# Patient Record
Sex: FEMALE | Race: WHITE | NOT HISPANIC OR LATINO | URBAN - METROPOLITAN AREA
[De-identification: names, ages, dates, MRNs, and addresses within clinical notes are randomized per-mention and may not be internally consistent; named-entity substitution may affect disease eponyms.]

---

## 2019-11-25 ENCOUNTER — INPATIENT (INPATIENT)
Facility: HOSPITAL | Age: 74
LOS: 15 days | Discharge: HOME HEALTH | End: 2019-12-11
Attending: INTERNAL MEDICINE | Admitting: INTERNAL MEDICINE
Payer: MEDICARE

## 2019-11-25 VITALS — HEART RATE: 166 BPM | OXYGEN SATURATION: 92 %

## 2019-11-25 DIAGNOSIS — E11.9 TYPE 2 DIABETES MELLITUS WITHOUT COMPLICATIONS: ICD-10-CM

## 2019-11-25 DIAGNOSIS — A41.9 SEPSIS, UNSPECIFIED ORGANISM: ICD-10-CM

## 2019-11-25 DIAGNOSIS — I48.91 UNSPECIFIED ATRIAL FIBRILLATION: ICD-10-CM

## 2019-11-25 DIAGNOSIS — N39.0 URINARY TRACT INFECTION, SITE NOT SPECIFIED: ICD-10-CM

## 2019-11-25 DIAGNOSIS — Z29.9 ENCOUNTER FOR PROPHYLACTIC MEASURES, UNSPECIFIED: ICD-10-CM

## 2019-11-25 DIAGNOSIS — J18.9 PNEUMONIA, UNSPECIFIED ORGANISM: ICD-10-CM

## 2019-11-25 DIAGNOSIS — J96.01 ACUTE RESPIRATORY FAILURE WITH HYPOXIA: ICD-10-CM

## 2019-11-25 DIAGNOSIS — J44.9 CHRONIC OBSTRUCTIVE PULMONARY DISEASE, UNSPECIFIED: ICD-10-CM

## 2019-11-25 LAB
ALBUMIN SERPL ELPH-MCNC: 3.8 G/DL — SIGNIFICANT CHANGE UP (ref 3.3–5)
ALP SERPL-CCNC: 99 U/L — SIGNIFICANT CHANGE UP (ref 40–120)
ALT FLD-CCNC: 13 U/L — SIGNIFICANT CHANGE UP (ref 12–78)
AMMONIA BLD-MCNC: 25 UMOL/L — SIGNIFICANT CHANGE UP (ref 11–32)
AMPHET UR-MCNC: NEGATIVE — SIGNIFICANT CHANGE UP
ANION GAP SERPL CALC-SCNC: 11 MMOL/L — SIGNIFICANT CHANGE UP (ref 5–17)
APPEARANCE UR: ABNORMAL
APTT BLD: 28.7 SEC — SIGNIFICANT CHANGE UP (ref 27.5–36.3)
AST SERPL-CCNC: 10 U/L — LOW (ref 15–37)
BACTERIA # UR AUTO: ABNORMAL
BARBITURATES UR SCN-MCNC: NEGATIVE — SIGNIFICANT CHANGE UP
BASE EXCESS BLDA CALC-SCNC: -2 MMOL/L — SIGNIFICANT CHANGE UP (ref -2–2)
BASE EXCESS BLDA CALC-SCNC: -5.5 MMOL/L — LOW (ref -2–2)
BASOPHILS # BLD AUTO: 0.04 K/UL — SIGNIFICANT CHANGE UP (ref 0–0.2)
BASOPHILS NFR BLD AUTO: 0.3 % — SIGNIFICANT CHANGE UP (ref 0–2)
BENZODIAZ UR-MCNC: POSITIVE — SIGNIFICANT CHANGE UP
BILIRUB SERPL-MCNC: 0.5 MG/DL — SIGNIFICANT CHANGE UP (ref 0.2–1.2)
BILIRUB UR-MCNC: NEGATIVE — SIGNIFICANT CHANGE UP
BLD GP AB SCN SERPL QL: SIGNIFICANT CHANGE UP
BLOOD GAS COMMENTS: SIGNIFICANT CHANGE UP
BLOOD GAS SOURCE: SIGNIFICANT CHANGE UP
BLOOD GAS SOURCE: SIGNIFICANT CHANGE UP
BUN SERPL-MCNC: 32 MG/DL — HIGH (ref 7–23)
CALCIUM SERPL-MCNC: 10.7 MG/DL — HIGH (ref 8.5–10.1)
CHLORIDE SERPL-SCNC: 117 MMOL/L — HIGH (ref 96–108)
CK MB CFR SERPL CALC: <1 NG/ML — SIGNIFICANT CHANGE UP (ref 0.5–3.6)
CO2 SERPL-SCNC: 24 MMOL/L — SIGNIFICANT CHANGE UP (ref 22–31)
COCAINE METAB.OTHER UR-MCNC: NEGATIVE — SIGNIFICANT CHANGE UP
COLOR SPEC: YELLOW — SIGNIFICANT CHANGE UP
COMMENT - URINE: SIGNIFICANT CHANGE UP
CREAT SERPL-MCNC: 0.98 MG/DL — SIGNIFICANT CHANGE UP (ref 0.5–1.3)
D DIMER BLD IA.RAPID-MCNC: 507 NG/ML DDU — HIGH
DIFF PNL FLD: ABNORMAL
EOSINOPHIL # BLD AUTO: 0 K/UL — SIGNIFICANT CHANGE UP (ref 0–0.5)
EOSINOPHIL NFR BLD AUTO: 0 % — SIGNIFICANT CHANGE UP (ref 0–6)
EPI CELLS # UR: ABNORMAL
ETHANOL SERPL-MCNC: <10 MG/DL — SIGNIFICANT CHANGE UP (ref 0–10)
FLU A RESULT: SIGNIFICANT CHANGE UP
FLU A RESULT: SIGNIFICANT CHANGE UP
FLUAV AG NPH QL: SIGNIFICANT CHANGE UP
FLUBV AG NPH QL: SIGNIFICANT CHANGE UP
GLUCOSE BLDC GLUCOMTR-MCNC: 181 MG/DL — HIGH (ref 70–99)
GLUCOSE BLDC GLUCOMTR-MCNC: 214 MG/DL — HIGH (ref 70–99)
GLUCOSE BLDC GLUCOMTR-MCNC: 221 MG/DL — HIGH (ref 70–99)
GLUCOSE SERPL-MCNC: 268 MG/DL — HIGH (ref 70–99)
GLUCOSE UR QL: 1000 MG/DL
HCO3 BLDA-SCNC: 22 MMOL/L — SIGNIFICANT CHANGE UP (ref 21–29)
HCO3 BLDA-SCNC: 23 MMOL/L — SIGNIFICANT CHANGE UP (ref 21–29)
HCT VFR BLD CALC: 54.6 % — HIGH (ref 34.5–45)
HGB BLD-MCNC: 16.7 G/DL — HIGH (ref 11.5–15.5)
HOROWITZ INDEX BLDA+IHG-RTO: 100 — SIGNIFICANT CHANGE UP
HOROWITZ INDEX BLDA+IHG-RTO: 80 — SIGNIFICANT CHANGE UP
IMM GRANULOCYTES NFR BLD AUTO: 0.3 % — SIGNIFICANT CHANGE UP (ref 0–1.5)
INR BLD: 1.31 RATIO — HIGH (ref 0.88–1.16)
KETONES UR-MCNC: ABNORMAL
LACTATE SERPL-SCNC: 2.3 MMOL/L — HIGH (ref 0.7–2)
LACTATE SERPL-SCNC: 2.4 MMOL/L — HIGH (ref 0.7–2)
LEUKOCYTE ESTERASE UR-ACNC: ABNORMAL
LIDOCAIN IGE QN: 68 U/L — LOW (ref 73–393)
LYMPHOCYTES # BLD AUTO: 1.75 K/UL — SIGNIFICANT CHANGE UP (ref 1–3.3)
LYMPHOCYTES # BLD AUTO: 14.1 % — SIGNIFICANT CHANGE UP (ref 13–44)
MCHC RBC-ENTMCNC: 28.3 PG — SIGNIFICANT CHANGE UP (ref 27–34)
MCHC RBC-ENTMCNC: 30.6 GM/DL — LOW (ref 32–36)
MCV RBC AUTO: 92.4 FL — SIGNIFICANT CHANGE UP (ref 80–100)
METHADONE UR-MCNC: NEGATIVE — SIGNIFICANT CHANGE UP
MONOCYTES # BLD AUTO: 0.87 K/UL — SIGNIFICANT CHANGE UP (ref 0–0.9)
MONOCYTES NFR BLD AUTO: 7 % — SIGNIFICANT CHANGE UP (ref 2–14)
NEUTROPHILS # BLD AUTO: 9.74 K/UL — HIGH (ref 1.8–7.4)
NEUTROPHILS NFR BLD AUTO: 78.3 % — HIGH (ref 43–77)
NITRITE UR-MCNC: NEGATIVE — SIGNIFICANT CHANGE UP
NRBC # BLD: 0 /100 WBCS — SIGNIFICANT CHANGE UP (ref 0–0)
OPIATES UR-MCNC: NEGATIVE — SIGNIFICANT CHANGE UP
PCO2 BLDA: 42 MMHG — SIGNIFICANT CHANGE UP (ref 32–46)
PCO2 BLDA: 54 MMHG — HIGH (ref 32–46)
PCP SPEC-MCNC: SIGNIFICANT CHANGE UP
PCP UR-MCNC: NEGATIVE — SIGNIFICANT CHANGE UP
PH BLD: 7.24 — LOW (ref 7.35–7.45)
PH BLD: 7.36 — SIGNIFICANT CHANGE UP (ref 7.35–7.45)
PH UR: 5 — SIGNIFICANT CHANGE UP (ref 5–8)
PLATELET # BLD AUTO: 294 K/UL — SIGNIFICANT CHANGE UP (ref 150–400)
PO2 BLDA: 130 MMHG — HIGH (ref 74–108)
PO2 BLDA: 74 MMHG — SIGNIFICANT CHANGE UP (ref 74–108)
POTASSIUM SERPL-MCNC: 3.9 MMOL/L — SIGNIFICANT CHANGE UP (ref 3.5–5.3)
POTASSIUM SERPL-SCNC: 3.9 MMOL/L — SIGNIFICANT CHANGE UP (ref 3.5–5.3)
PROT SERPL-MCNC: 8.8 GM/DL — HIGH (ref 6–8.3)
PROT UR-MCNC: 100 MG/DL
PROTHROM AB SERPL-ACNC: 14.8 SEC — HIGH (ref 10–12.9)
RBC # BLD: 5.91 M/UL — HIGH (ref 3.8–5.2)
RBC # FLD: 14.9 % — HIGH (ref 10.3–14.5)
RSV RESULT: SIGNIFICANT CHANGE UP
RSV RNA RESP QL NAA+PROBE: SIGNIFICANT CHANGE UP
SAO2 % BLDA: 95 % — SIGNIFICANT CHANGE UP (ref 92–96)
SAO2 % BLDA: 98 % — HIGH (ref 92–96)
SODIUM SERPL-SCNC: 152 MMOL/L — HIGH (ref 135–145)
SP GR SPEC: 1.01 — SIGNIFICANT CHANGE UP (ref 1.01–1.02)
THC UR QL: NEGATIVE — SIGNIFICANT CHANGE UP
TROPONIN I SERPL-MCNC: <.015 NG/ML — SIGNIFICANT CHANGE UP (ref 0.01–0.04)
UROBILINOGEN FLD QL: NEGATIVE MG/DL — SIGNIFICANT CHANGE UP
WBC # BLD: 12.44 K/UL — HIGH (ref 3.8–10.5)
WBC # FLD AUTO: 12.44 K/UL — HIGH (ref 3.8–10.5)
WBC UR QL: >50

## 2019-11-25 PROCEDURE — 93010 ELECTROCARDIOGRAM REPORT: CPT

## 2019-11-25 PROCEDURE — 31500 INSERT EMERGENCY AIRWAY: CPT

## 2019-11-25 PROCEDURE — 71275 CT ANGIOGRAPHY CHEST: CPT | Mod: 26

## 2019-11-25 PROCEDURE — 99291 CRITICAL CARE FIRST HOUR: CPT | Mod: 25

## 2019-11-25 PROCEDURE — 71045 X-RAY EXAM CHEST 1 VIEW: CPT | Mod: 26

## 2019-11-25 PROCEDURE — 71045 X-RAY EXAM CHEST 1 VIEW: CPT | Mod: 26,76

## 2019-11-25 PROCEDURE — 74177 CT ABD & PELVIS W/CONTRAST: CPT | Mod: 26

## 2019-11-25 PROCEDURE — 71045 X-RAY EXAM CHEST 1 VIEW: CPT | Mod: 26,77

## 2019-11-25 PROCEDURE — 70450 CT HEAD/BRAIN W/O DYE: CPT | Mod: 26

## 2019-11-25 PROCEDURE — 99291 CRITICAL CARE FIRST HOUR: CPT

## 2019-11-25 RX ORDER — VANCOMYCIN HCL 1 G
1000 VIAL (EA) INTRAVENOUS EVERY 24 HOURS
Refills: 0 | Status: DISCONTINUED | OUTPATIENT
Start: 2019-11-25 | End: 2019-11-25

## 2019-11-25 RX ORDER — ENOXAPARIN SODIUM 100 MG/ML
40 INJECTION SUBCUTANEOUS EVERY 12 HOURS
Refills: 0 | Status: DISCONTINUED | OUTPATIENT
Start: 2019-11-25 | End: 2019-11-25

## 2019-11-25 RX ORDER — FLUCONAZOLE 150 MG/1
100 TABLET ORAL EVERY 24 HOURS
Refills: 0 | Status: DISCONTINUED | OUTPATIENT
Start: 2019-11-26 | End: 2019-11-26

## 2019-11-25 RX ORDER — CHLORHEXIDINE GLUCONATE 213 G/1000ML
1 SOLUTION TOPICAL
Refills: 0 | Status: DISCONTINUED | OUTPATIENT
Start: 2019-11-25 | End: 2019-12-04

## 2019-11-25 RX ORDER — FLUCONAZOLE 150 MG/1
TABLET ORAL
Refills: 0 | Status: DISCONTINUED | OUTPATIENT
Start: 2019-11-25 | End: 2019-11-26

## 2019-11-25 RX ORDER — AZITHROMYCIN 500 MG/1
TABLET, FILM COATED ORAL
Refills: 0 | Status: DISCONTINUED | OUTPATIENT
Start: 2019-11-25 | End: 2019-11-26

## 2019-11-25 RX ORDER — AZITHROMYCIN 500 MG/1
500 TABLET, FILM COATED ORAL ONCE
Refills: 0 | Status: COMPLETED | OUTPATIENT
Start: 2019-11-25 | End: 2019-11-25

## 2019-11-25 RX ORDER — MIDAZOLAM HYDROCHLORIDE 1 MG/ML
1 INJECTION, SOLUTION INTRAMUSCULAR; INTRAVENOUS ONCE
Refills: 0 | Status: DISCONTINUED | OUTPATIENT
Start: 2019-11-25 | End: 2019-11-25

## 2019-11-25 RX ORDER — MIDAZOLAM HYDROCHLORIDE 1 MG/ML
1 INJECTION, SOLUTION INTRAMUSCULAR; INTRAVENOUS EVERY 8 HOURS
Refills: 0 | Status: DISCONTINUED | OUTPATIENT
Start: 2019-11-25 | End: 2019-12-02

## 2019-11-25 RX ORDER — PROPOFOL 10 MG/ML
20 INJECTION, EMULSION INTRAVENOUS
Qty: 1000 | Refills: 0 | Status: DISCONTINUED | OUTPATIENT
Start: 2019-11-25 | End: 2019-11-28

## 2019-11-25 RX ORDER — SODIUM CHLORIDE 9 MG/ML
1000 INJECTION, SOLUTION INTRAVENOUS
Refills: 0 | Status: DISCONTINUED | OUTPATIENT
Start: 2019-11-25 | End: 2019-11-26

## 2019-11-25 RX ORDER — PIPERACILLIN AND TAZOBACTAM 4; .5 G/20ML; G/20ML
3.38 INJECTION, POWDER, LYOPHILIZED, FOR SOLUTION INTRAVENOUS ONCE
Refills: 0 | Status: COMPLETED | OUTPATIENT
Start: 2019-11-25 | End: 2019-11-25

## 2019-11-25 RX ORDER — METOPROLOL TARTRATE 50 MG
5 TABLET ORAL EVERY 6 HOURS
Refills: 0 | Status: DISCONTINUED | OUTPATIENT
Start: 2019-11-25 | End: 2019-11-26

## 2019-11-25 RX ORDER — CHLORHEXIDINE GLUCONATE 213 G/1000ML
15 SOLUTION TOPICAL EVERY 12 HOURS
Refills: 0 | Status: DISCONTINUED | OUTPATIENT
Start: 2019-11-25 | End: 2019-12-02

## 2019-11-25 RX ORDER — DEXTROSE 50 % IN WATER 50 %
12.5 SYRINGE (ML) INTRAVENOUS ONCE
Refills: 0 | Status: DISCONTINUED | OUTPATIENT
Start: 2019-11-25 | End: 2019-12-09

## 2019-11-25 RX ORDER — SODIUM CHLORIDE 9 MG/ML
3000 INJECTION, SOLUTION INTRAVENOUS ONCE
Refills: 0 | Status: COMPLETED | OUTPATIENT
Start: 2019-11-25 | End: 2019-11-25

## 2019-11-25 RX ORDER — GLUCAGON INJECTION, SOLUTION 0.5 MG/.1ML
1 INJECTION, SOLUTION SUBCUTANEOUS ONCE
Refills: 0 | Status: DISCONTINUED | OUTPATIENT
Start: 2019-11-25 | End: 2019-12-09

## 2019-11-25 RX ORDER — VANCOMYCIN HCL 1 G
750 VIAL (EA) INTRAVENOUS EVERY 12 HOURS
Refills: 0 | Status: DISCONTINUED | OUTPATIENT
Start: 2019-11-25 | End: 2019-11-27

## 2019-11-25 RX ORDER — ENOXAPARIN SODIUM 100 MG/ML
90 INJECTION SUBCUTANEOUS EVERY 12 HOURS
Refills: 0 | Status: DISCONTINUED | OUTPATIENT
Start: 2019-11-25 | End: 2019-11-26

## 2019-11-25 RX ORDER — FLUCONAZOLE 150 MG/1
200 TABLET ORAL ONCE
Refills: 0 | Status: COMPLETED | OUTPATIENT
Start: 2019-11-25 | End: 2019-11-25

## 2019-11-25 RX ORDER — PIPERACILLIN AND TAZOBACTAM 4; .5 G/20ML; G/20ML
3.38 INJECTION, POWDER, LYOPHILIZED, FOR SOLUTION INTRAVENOUS EVERY 8 HOURS
Refills: 0 | Status: DISCONTINUED | OUTPATIENT
Start: 2019-11-25 | End: 2019-11-29

## 2019-11-25 RX ORDER — DEXTROSE 50 % IN WATER 50 %
25 SYRINGE (ML) INTRAVENOUS ONCE
Refills: 0 | Status: DISCONTINUED | OUTPATIENT
Start: 2019-11-25 | End: 2019-12-09

## 2019-11-25 RX ORDER — DEXMEDETOMIDINE HYDROCHLORIDE IN 0.9% SODIUM CHLORIDE 4 UG/ML
0.2 INJECTION INTRAVENOUS
Qty: 200 | Refills: 0 | Status: DISCONTINUED | OUTPATIENT
Start: 2019-11-25 | End: 2019-11-28

## 2019-11-25 RX ORDER — DILTIAZEM HCL 120 MG
10 CAPSULE, EXT RELEASE 24 HR ORAL ONCE
Refills: 0 | Status: COMPLETED | OUTPATIENT
Start: 2019-11-25 | End: 2019-11-25

## 2019-11-25 RX ORDER — IPRATROPIUM/ALBUTEROL SULFATE 18-103MCG
3 AEROSOL WITH ADAPTER (GRAM) INHALATION EVERY 6 HOURS
Refills: 0 | Status: DISCONTINUED | OUTPATIENT
Start: 2019-11-25 | End: 2019-12-11

## 2019-11-25 RX ORDER — INSULIN LISPRO 100/ML
VIAL (ML) SUBCUTANEOUS EVERY 6 HOURS
Refills: 0 | Status: DISCONTINUED | OUTPATIENT
Start: 2019-11-25 | End: 2019-12-09

## 2019-11-25 RX ORDER — SODIUM CHLORIDE 9 MG/ML
1000 INJECTION, SOLUTION INTRAVENOUS
Refills: 0 | Status: DISCONTINUED | OUTPATIENT
Start: 2019-11-25 | End: 2019-11-25

## 2019-11-25 RX ORDER — SODIUM CHLORIDE 9 MG/ML
1000 INJECTION, SOLUTION INTRAVENOUS
Refills: 0 | Status: DISCONTINUED | OUTPATIENT
Start: 2019-11-25 | End: 2019-12-09

## 2019-11-25 RX ORDER — DEXTROSE 50 % IN WATER 50 %
15 SYRINGE (ML) INTRAVENOUS ONCE
Refills: 0 | Status: DISCONTINUED | OUTPATIENT
Start: 2019-11-25 | End: 2019-12-09

## 2019-11-25 RX ORDER — AZITHROMYCIN 500 MG/1
500 TABLET, FILM COATED ORAL EVERY 24 HOURS
Refills: 0 | Status: DISCONTINUED | OUTPATIENT
Start: 2019-11-26 | End: 2019-11-26

## 2019-11-25 RX ORDER — VANCOMYCIN HCL 1 G
1000 VIAL (EA) INTRAVENOUS ONCE
Refills: 0 | Status: COMPLETED | OUTPATIENT
Start: 2019-11-25 | End: 2019-11-25

## 2019-11-25 RX ORDER — ACETAMINOPHEN 500 MG
1000 TABLET ORAL ONCE
Refills: 0 | Status: COMPLETED | OUTPATIENT
Start: 2019-11-25 | End: 2019-11-25

## 2019-11-25 RX ORDER — NOREPINEPHRINE BITARTRATE/D5W 8 MG/250ML
0.05 PLASTIC BAG, INJECTION (ML) INTRAVENOUS
Qty: 8 | Refills: 0 | Status: DISCONTINUED | OUTPATIENT
Start: 2019-11-25 | End: 2019-11-27

## 2019-11-25 RX ADMIN — Medication 5 MILLIGRAM(S): at 17:03

## 2019-11-25 RX ADMIN — SODIUM CHLORIDE 75 MILLILITER(S): 9 INJECTION, SOLUTION INTRAVENOUS at 20:58

## 2019-11-25 RX ADMIN — CHLORHEXIDINE GLUCONATE 15 MILLILITER(S): 213 SOLUTION TOPICAL at 18:11

## 2019-11-25 RX ADMIN — MIDAZOLAM HYDROCHLORIDE 1 MILLIGRAM(S): 1 INJECTION, SOLUTION INTRAMUSCULAR; INTRAVENOUS at 19:19

## 2019-11-25 RX ADMIN — PIPERACILLIN AND TAZOBACTAM 200 GRAM(S): 4; .5 INJECTION, POWDER, LYOPHILIZED, FOR SOLUTION INTRAVENOUS at 14:41

## 2019-11-25 RX ADMIN — SODIUM CHLORIDE 3000 MILLILITER(S): 9 INJECTION, SOLUTION INTRAVENOUS at 12:59

## 2019-11-25 RX ADMIN — Medication 2: at 17:51

## 2019-11-25 RX ADMIN — DEXMEDETOMIDINE HYDROCHLORIDE IN 0.9% SODIUM CHLORIDE 4.54 MICROGRAM(S)/KG/HR: 4 INJECTION INTRAVENOUS at 20:50

## 2019-11-25 RX ADMIN — CHLORHEXIDINE GLUCONATE 1 APPLICATION(S): 213 SOLUTION TOPICAL at 16:45

## 2019-11-25 RX ADMIN — Medication 8.5 MICROGRAM(S)/KG/MIN: at 20:36

## 2019-11-25 RX ADMIN — ENOXAPARIN SODIUM 90 MILLIGRAM(S): 100 INJECTION SUBCUTANEOUS at 18:10

## 2019-11-25 RX ADMIN — PROPOFOL 10.88 MICROGRAM(S)/KG/MIN: 10 INJECTION, EMULSION INTRAVENOUS at 17:00

## 2019-11-25 RX ADMIN — Medication 4: at 23:50

## 2019-11-25 RX ADMIN — SODIUM CHLORIDE 75 MILLILITER(S): 9 INJECTION, SOLUTION INTRAVENOUS at 17:00

## 2019-11-25 RX ADMIN — PROPOFOL 10.88 MICROGRAM(S)/KG/MIN: 10 INJECTION, EMULSION INTRAVENOUS at 16:59

## 2019-11-25 RX ADMIN — MIDAZOLAM HYDROCHLORIDE 1 MILLIGRAM(S): 1 INJECTION, SOLUTION INTRAMUSCULAR; INTRAVENOUS at 20:49

## 2019-11-25 RX ADMIN — Medication 400 MILLIGRAM(S): at 23:44

## 2019-11-25 RX ADMIN — FLUCONAZOLE 100 MILLIGRAM(S): 150 TABLET ORAL at 20:29

## 2019-11-25 RX ADMIN — PIPERACILLIN AND TAZOBACTAM 25 GRAM(S): 4; .5 INJECTION, POWDER, LYOPHILIZED, FOR SOLUTION INTRAVENOUS at 22:17

## 2019-11-25 RX ADMIN — AZITHROMYCIN 255 MILLIGRAM(S): 500 TABLET, FILM COATED ORAL at 18:04

## 2019-11-25 RX ADMIN — Medication 250 MILLIGRAM(S): at 14:39

## 2019-11-25 RX ADMIN — Medication 10 MILLIGRAM(S): at 14:20

## 2019-11-25 RX ADMIN — DEXMEDETOMIDINE HYDROCHLORIDE IN 0.9% SODIUM CHLORIDE 4.54 MICROGRAM(S)/KG/HR: 4 INJECTION INTRAVENOUS at 16:45

## 2019-11-25 NOTE — ED PROVIDER NOTE - SECONDARY DIAGNOSIS.
Urinary tract infection without hematuria, site unspecified Sepsis, due to unspecified organism, unspecified whether acute organ dysfunction present

## 2019-11-25 NOTE — H&P ADULT - PROBLEM SELECTOR PLAN 2
-broad spectrum ABX, Zosyn, Vanco and Zithromax  -IVF resuscitation,   -follow up lactate,   -urine for legionella   -follow up blood urine sputum cultures,   -repeat labs

## 2019-11-25 NOTE — H&P ADULT - HISTORY OF PRESENT ILLNESS
73 yo F bibems for acute resp failure.  Pt. has been reportedly sob and altered for 3 days.  Patient can not give further history due to condition.  Noted to be hypoxic  and blue colored.  No other complaints or inciting event.  No family at bedside now. Disoriented for last few days 4 days, sleeping, coughing with mucous and congestion, can't swallow pills.  Pt. has 24 hour HHA, lives with son. As per son increased secretions last few day, Increased lethargy.  Multiple visits in last month for UTI.  In ER noted to be hypoxic requiring intubation. Received 3 liter IV fluid Lacate 2.3.  ICU called for evaluation and management

## 2019-11-25 NOTE — ED PROVIDER NOTE - CLINICAL SUMMARY MEDICAL DECISION MAKING FREE TEXT BOX
73 yo F with acute hypoxic respiratory failure, concerning for sepsis, ACS, pna, PE, bowel ischemia, obstruction  -labs, imaging including CTA chest, CT abd/pel, CT brain, sepsis antbx and fluid bolus, lactate, trop, ckmb, monitor, ABG  -f/u results, reeval

## 2019-11-25 NOTE — ED PROVIDER NOTE - PHYSICAL EXAMINATION
vitals: tachy at 160, a-fib with RVR on monitor   Gen: unresponsive, not localizing pain, staring blankly, hypoxic discoloration of face   Head: ncat, pupils 5 mm minimally reactive b/l  Neck: supple, no lymphadenopathy, no midline deviation  Heart: rrr, no m/r/g  Lungs: CTA b/l, no rales/ronchi/wheezes, b/l breath sounds after intubation   Abd: soft, nontender, non-distended, no rebound or guarding  Ext: no clubbing/cyanosis/edema  Neuro: unresponsive to pain, not following commands, no moaning to pain, no spontaneous movement of pupils

## 2019-11-25 NOTE — ED ADULT TRIAGE NOTE - CHIEF COMPLAINT QUOTE
Pt BIBA with c/o respiratory distress at home, upon arrival to ED o2 sat in the 80's and AMS with adventitious breath sounds

## 2019-11-25 NOTE — ED PROVIDER NOTE - CRITICAL CARE PROVIDED
consult w/ pt's family directly relating to pts condition/interpretation of diagnostic studies/documentation/consultation with other physicians/additional history taking/direct patient care (not related to procedure)

## 2019-11-25 NOTE — H&P ADULT - NSHPLABSRESULTS_GEN_ALL_CORE
CBC Full  -  ( 2019 13:01 )  WBC Count : 12.44 K/uL  RBC Count : 5.91 M/uL  Hemoglobin : 16.7 g/dL  Hematocrit : 54.6 %  Platelet Count - Automated : 294 K/uL  Mean Cell Volume : 92.4 fl  Mean Cell Hemoglobin : 28.3 pg  Mean Cell Hemoglobin Concentration : 30.6 gm/dL  Auto Neutrophil # : 9.74 K/uL  Auto Lymphocyte # : 1.75 K/uL  Auto Monocyte # : 0.87 K/uL  Auto Eosinophil # : 0.00 K/uL  Auto Basophil # : 0.04 K/uL  Auto Neutrophil % : 78.3 %  Auto Lymphocyte % : 14.1 %  Auto Monocyte % : 7.0 %  Auto Eosinophil % : 0.0 %  Auto Basophil % : 0.3 %          152<H>  |  117<H>  |  32<H>  ----------------------------<  268<H>  3.9   |  24  |  0.98    Ca    10.7<H>      2019 13:01  TPro  8.8<H>  /  Alb  3.8  /  TBili  0.5  /  DBili  x   /  AST  10<L>  /  ALT  13  /  AlkPhos  99    LIVER FUNCTIONS - ( 2019 13:01 )  Alb: 3.8 g/dL / Pro: 8.8 gm/dL / ALK PHOS: 99 U/L / ALT: 13 U/L / AST: 10 U/L / GGT: x           CAPILLARY BLOOD GLUCOSE  POCT Blood Glucose.: 181 mg/dL (2019 17:13)  POCT Blood Glucose.: 221 mg/dL (2019 12:43)    PT/INR - ( 2019 13:01 )   PT: 14.8 sec;   INR: 1.31 ratio    PTT - ( 2019 13:01 )  PTT:28.7 sec    Urinalysis Basic - ( 2019 14:04 )  Color: Yellow / Appearance: very cloudy / S.010 / pH: x  Gluc: x / Ketone: Moderate  / Bili: Negative / Urobili: Negative mg/dL   Blood: x / Protein: 100 mg/dL / Nitrite: Negative   Leuk Esterase: Moderate / RBC: x / WBC >50   Sq Epi: x / Non Sq Epi: Moderate / Bacteria: Moderate    < from: CT Abdomen and Pelvis w/ IV Cont (19 @ 16:20) >  IMPRESSION:   No CT evidence for acute pulmonary embolism as discussed.  Left lower lobe airspaceconsolidation which may reflect atelectasis   and/or pneumonia in the proper clinical setting.  Multilevel degenerative changes of the spine.  Prominent degenerative changes of the bilateral shoulder joints.  < end of copied text >    Blood Gas Profile - Arterial (19 @ 14:22)    pH, Blood: 7.24    Blood Gas Source: Arterial    Blood Gas Comments: noted justen test performed and positive    pCO2, Arterial: 54 mmHg    pO2, Arterial: 130 mmHg    HCO3, Arterial: 22 mmol/L    Base Excess, Arterial: -5.5 mmol/L    Oxygen Saturation, Arterial: 98 %    FIO2, Arterial: 100    Mode: AC/ CMV (Assist Control/ Continuous Mandatory Ventilation)  RR (machine): 18  TV (machine): 400  FiO2: 80  PEEP: 5  ITime: 0.91  MAP: 16  PIP: 35

## 2019-11-25 NOTE — H&P ADULT - NSICDXPASTMEDICALHX_GEN_ALL_CORE_FT
PAST MEDICAL HISTORY:  Afib     COPD with respiratory failure, acute     Dementia     Dementia, senile with depression     DM (diabetes mellitus)     HTN (hypertension)     Recurrent UTI

## 2019-11-25 NOTE — H&P ADULT - NSHPPHYSICALEXAM_GEN_ALL_CORE
GENERAL: NAD, sedated on vnet, well-groomed, well-developed  HEAD:  Atraumatic, Normocephalic  EYES: EOMI, PERRLA, conjunctiva and sclera clear, pupils 5 mm minimally reactive b/l  ENMT: No tonsillar erythema, exudates, or enlargement; Moist mucous membranes  NECK: Supple, No JVD, Normal thyroid, no lymphadenopathy, no midline deviation  NERVOUS SYSTEM:  unresponsive to pain, does not follow commands   CHEST/LUNG: Clear to percussion bilaterally; No rales, rhonchi, wheezing, or rubs  HEART: Tachycardic hr 130's No murmurs, rubs, or gallops  ABDOMEN: Soft, obese,  Nontender, Nondistended; Bowel sounds present  EXTREMITIES:  2+ Peripheral Pulses, No clubbing, cyanosis, or edema  SKIN: No rashes or lesions

## 2019-11-25 NOTE — ED ADULT NURSE REASSESSMENT NOTE - NS ED NURSE REASSESS COMMENT FT1
1300 pt received from covering rn with e/t tube ventilator portable chest xray and ekg donefamily at bedside

## 2019-11-25 NOTE — ED PROVIDER NOTE - CARE PLAN
Principal Discharge DX:	Acute respiratory failure with hypoxia Principal Discharge DX:	Acute respiratory failure with hypoxia  Secondary Diagnosis:	Urinary tract infection without hematuria, site unspecified  Secondary Diagnosis:	Sepsis, due to unspecified organism, unspecified whether acute organ dysfunction present

## 2019-11-25 NOTE — H&P ADULT - ASSESSMENT
75 y/o female PMH DM, HTN, Afib, Dementia with depression, COPD, hyperlipidema,  UTI. Admitted with increased lethargy cough and   SOB found to be hypoxic - acute respiratory failure requiring intubation  2/2 pneumonia and UTI

## 2019-11-25 NOTE — PATIENT PROFILE ADULT - MUSCULOSKELETAL CONDITIONS MANAGED AT HOME
----- Message from Derick Cardenas DO sent at 4/11/2019 10:18 AM CDT -----  Call Parag   D/c  metformin due to elevated lactic acid    osteoarthritis

## 2019-11-25 NOTE — H&P ADULT - PROBLEM SELECTOR PLAN 5
- insulin sliding scale and fingersticks,   -check A1C,   -diabetic teaching when appropriate   -ADA diet or tube feeding

## 2019-11-25 NOTE — H&P ADULT - PROBLEM SELECTOR PLAN 1
Admit MICU as d/w intensivist,   - mechanical ventilation,   - abg PRN, respiratory treatment,   - broad spectrum ABX,   - follow up culture,   -Peridex,   - follow up xray,   -sedation - propofol, precedex   - versed - ivp for chronic benzo use  - weaning daily

## 2019-11-25 NOTE — ED PROVIDER NOTE - OBJECTIVE STATEMENT
75 yo F bibems for acute resp failure.  Pt. has been reportedly sob and altered for 3 days.  Pt. cannot give further history due to condition.  Noted to be hypoxic  and blue colored.  No other complaints or inciting event.  No family at bedside now.  ROS: Unobtainable from patient  PMH: DM, HTN, HLD; Meds: See EMR for list; SH: unobtainable 73 yo F bibems for acute resp failure.  Pt. has been reportedly sob and altered for 3 days.  Pt. cannot give further history due to condition.  Noted to be hypoxic  and blue colored.  No other complaints or inciting event.  No family at bedside now.  disoriented for last few days 4 days, sleeping, coughing with mucous and congestion, can't swallow pills.  Pt. has 24 hour HHA, lives with son.  Multiple visits in last month for UTI.  ROS: Unobtainable from patient  PMH: DM, HTN, HLD, a-fib, COPD; Meds: See EMR for list; SH: unobtainable

## 2019-11-25 NOTE — H&P ADULT - ATTENDING COMMENTS
74F w/ DM, HTN, dementia/depression, ?COPD. Presents with increased lethargy, cough, and SOB. Found to be hypoxemic and cyanotic upon arrival to the ED, prompting intubation. Seen and examined in the ED.    - sedation with precedex and propofol  - will need BDZ since pt takes it at home   - will need to sort out psych meds pt is taking and which she is not taking  - thick secretions noted in ETT and LLL pneumonia noted on CT chest  - continue w/ vancomycin and zosyn, will add azithromycin for atypical coverage  - UA grossly positive with yeast as well   - will add diflucan as well for possible yeast UTI  - f/u blood cx, urine cx  - check RVP, urine legionella  - chest PT, duonebs ATC; no need for steroids at this time  - titrate vent settings to ABG  - noted to be in rapid afib, start metoprolol PRN, start full dose lovenox  - NGT, can start TF, protonix  - can start 0.45NS for hypernatremia   - FS, ISS

## 2019-11-25 NOTE — ED ADULT NURSE NOTE - NSIMPLEMENTINTERV_GEN_ALL_ED
Implemented All Fall with Harm Risk Interventions:  [place] to call system. Call bell, personal items and telephone within reach. Instruct patient to call for assistance. Room bathroom lighting operational. Non-slip footwear when patient is off stretcher. Physically safe environment: no spills, clutter or unnecessary equipment. Stretcher in lowest position, wheels locked, appropriate side rails in place. Provide visual cue, wrist band, yellow gown, etc. Monitor gait and stability. Monitor for mental status changes and reorient to person, place, and time. Review medications for side effects contributing to fall risk. Reinforce activity limits and safety measures with patient and family. Provide visual clues: red socks.

## 2019-11-25 NOTE — H&P ADULT - PROBLEM SELECTOR PLAN 7
-full dose anticoagulation with Lovenox  -Adjust BP meds.   -Monitor BP closely. Salt restriction.   -Pt advised on compliance when appropriate

## 2019-11-26 LAB
ALBUMIN SERPL ELPH-MCNC: 2.7 G/DL — LOW (ref 3.3–5)
ALP SERPL-CCNC: 70 U/L — SIGNIFICANT CHANGE UP (ref 40–120)
ALT FLD-CCNC: 9 U/L — LOW (ref 12–78)
ANION GAP SERPL CALC-SCNC: 12 MMOL/L — SIGNIFICANT CHANGE UP (ref 5–17)
AST SERPL-CCNC: 13 U/L — LOW (ref 15–37)
BILIRUB SERPL-MCNC: 0.6 MG/DL — SIGNIFICANT CHANGE UP (ref 0.2–1.2)
BUN SERPL-MCNC: 23 MG/DL — SIGNIFICANT CHANGE UP (ref 7–23)
CALCIUM SERPL-MCNC: 9.5 MG/DL — SIGNIFICANT CHANGE UP (ref 8.5–10.1)
CHLORIDE SERPL-SCNC: 115 MMOL/L — HIGH (ref 96–108)
CHOLEST SERPL-MCNC: 59 MG/DL — SIGNIFICANT CHANGE UP (ref 10–199)
CO2 SERPL-SCNC: 23 MMOL/L — SIGNIFICANT CHANGE UP (ref 22–31)
CORTIS AM PEAK SERPL-MCNC: 27.1 UG/DL — HIGH (ref 6–18.4)
CREAT SERPL-MCNC: 0.97 MG/DL — SIGNIFICANT CHANGE UP (ref 0.5–1.3)
GLUCOSE BLDC GLUCOMTR-MCNC: 167 MG/DL — HIGH (ref 70–99)
GLUCOSE BLDC GLUCOMTR-MCNC: 172 MG/DL — HIGH (ref 70–99)
GLUCOSE BLDC GLUCOMTR-MCNC: 199 MG/DL — HIGH (ref 70–99)
GLUCOSE BLDC GLUCOMTR-MCNC: 204 MG/DL — HIGH (ref 70–99)
GLUCOSE SERPL-MCNC: 209 MG/DL — HIGH (ref 70–99)
GRAM STN FLD: SIGNIFICANT CHANGE UP
HBA1C BLD-MCNC: 7.3 % — HIGH (ref 4–5.6)
HCT VFR BLD CALC: 54.5 % — HIGH (ref 34.5–45)
HCV AB S/CO SERPL IA: 0.26 S/CO — SIGNIFICANT CHANGE UP (ref 0–0.99)
HCV AB SERPL-IMP: SIGNIFICANT CHANGE UP
HDLC SERPL-MCNC: 30 MG/DL — LOW
HGB BLD-MCNC: 16.3 G/DL — HIGH (ref 11.5–15.5)
LEGIONELLA AG UR QL: NEGATIVE — SIGNIFICANT CHANGE UP
LIPID PNL WITH DIRECT LDL SERPL: SIGNIFICANT CHANGE UP MG/DL
MAGNESIUM SERPL-MCNC: 1.9 MG/DL — SIGNIFICANT CHANGE UP (ref 1.6–2.6)
MCHC RBC-ENTMCNC: 28.2 PG — SIGNIFICANT CHANGE UP (ref 27–34)
MCHC RBC-ENTMCNC: 29.9 GM/DL — LOW (ref 32–36)
MCV RBC AUTO: 94.1 FL — SIGNIFICANT CHANGE UP (ref 80–100)
MRSA PCR RESULT.: SIGNIFICANT CHANGE UP
NRBC # BLD: 0 /100 WBCS — SIGNIFICANT CHANGE UP (ref 0–0)
PHOSPHATE SERPL-MCNC: 1.7 MG/DL — LOW (ref 2.5–4.5)
PLATELET # BLD AUTO: 226 K/UL — SIGNIFICANT CHANGE UP (ref 150–400)
POTASSIUM SERPL-MCNC: 3.3 MMOL/L — LOW (ref 3.5–5.3)
POTASSIUM SERPL-SCNC: 3.3 MMOL/L — LOW (ref 3.5–5.3)
PROCALCITONIN SERPL-MCNC: 1.14 NG/ML — HIGH (ref 0.02–0.1)
PROT SERPL-MCNC: 6.8 GM/DL — SIGNIFICANT CHANGE UP (ref 6–8.3)
RAPID RVP RESULT: SIGNIFICANT CHANGE UP
RBC # BLD: 5.79 M/UL — HIGH (ref 3.8–5.2)
RBC # FLD: 15.2 % — HIGH (ref 10.3–14.5)
S AUREUS DNA NOSE QL NAA+PROBE: DETECTED
SODIUM SERPL-SCNC: 150 MMOL/L — HIGH (ref 135–145)
SPECIMEN SOURCE: SIGNIFICANT CHANGE UP
TOTAL CHOLESTEROL/HDL RATIO MEASUREMENT: 2 RATIO — LOW (ref 3.3–7.1)
TRIGL SERPL-MCNC: 144 MG/DL — SIGNIFICANT CHANGE UP (ref 10–149)
TSH SERPL-MCNC: 0.12 UIU/ML — LOW (ref 0.36–3.74)
WBC # BLD: 13.78 K/UL — HIGH (ref 3.8–10.5)
WBC # FLD AUTO: 13.78 K/UL — HIGH (ref 3.8–10.5)

## 2019-11-26 PROCEDURE — 99291 CRITICAL CARE FIRST HOUR: CPT

## 2019-11-26 RX ORDER — PHENYLEPHRINE HYDROCHLORIDE 10 MG/ML
0.4 INJECTION INTRAVENOUS
Qty: 40 | Refills: 0 | Status: DISCONTINUED | OUTPATIENT
Start: 2019-11-26 | End: 2019-12-01

## 2019-11-26 RX ORDER — POTASSIUM CHLORIDE 20 MEQ
10 PACKET (EA) ORAL ONCE
Refills: 0 | Status: DISCONTINUED | OUTPATIENT
Start: 2019-11-26 | End: 2019-11-26

## 2019-11-26 RX ORDER — FLUCONAZOLE 150 MG/1
100 TABLET ORAL
Refills: 0 | Status: DISCONTINUED | OUTPATIENT
Start: 2019-11-26 | End: 2019-11-29

## 2019-11-26 RX ORDER — POTASSIUM CHLORIDE 20 MEQ
10 PACKET (EA) ORAL ONCE
Refills: 0 | Status: COMPLETED | OUTPATIENT
Start: 2019-11-26 | End: 2019-11-26

## 2019-11-26 RX ORDER — POTASSIUM PHOSPHATE, MONOBASIC POTASSIUM PHOSPHATE, DIBASIC 236; 224 MG/ML; MG/ML
15 INJECTION, SOLUTION INTRAVENOUS ONCE
Refills: 0 | Status: COMPLETED | OUTPATIENT
Start: 2019-11-26 | End: 2019-11-26

## 2019-11-26 RX ORDER — MIDAZOLAM HYDROCHLORIDE 1 MG/ML
1 INJECTION, SOLUTION INTRAMUSCULAR; INTRAVENOUS ONCE
Refills: 0 | Status: DISCONTINUED | OUTPATIENT
Start: 2019-11-26 | End: 2019-11-26

## 2019-11-26 RX ORDER — ENOXAPARIN SODIUM 100 MG/ML
80 INJECTION SUBCUTANEOUS EVERY 12 HOURS
Refills: 0 | Status: DISCONTINUED | OUTPATIENT
Start: 2019-11-26 | End: 2019-12-11

## 2019-11-26 RX ORDER — ACETAMINOPHEN 500 MG
650 TABLET ORAL EVERY 6 HOURS
Refills: 0 | Status: DISCONTINUED | OUTPATIENT
Start: 2019-11-26 | End: 2019-11-29

## 2019-11-26 RX ORDER — PANTOPRAZOLE SODIUM 20 MG/1
40 TABLET, DELAYED RELEASE ORAL DAILY
Refills: 0 | Status: DISCONTINUED | OUTPATIENT
Start: 2019-11-26 | End: 2019-12-03

## 2019-11-26 RX ORDER — SODIUM CHLORIDE 9 MG/ML
3 INJECTION INTRAMUSCULAR; INTRAVENOUS; SUBCUTANEOUS EVERY 6 HOURS
Refills: 0 | Status: DISCONTINUED | OUTPATIENT
Start: 2019-11-26 | End: 2019-12-04

## 2019-11-26 RX ORDER — POLYETHYLENE GLYCOL 3350 17 G/17G
17 POWDER, FOR SOLUTION ORAL DAILY
Refills: 0 | Status: DISCONTINUED | OUTPATIENT
Start: 2019-11-26 | End: 2019-12-11

## 2019-11-26 RX ADMIN — Medication 3 MILLILITER(S): at 23:17

## 2019-11-26 RX ADMIN — MIDAZOLAM HYDROCHLORIDE 1 MILLIGRAM(S): 1 INJECTION, SOLUTION INTRAMUSCULAR; INTRAVENOUS at 06:32

## 2019-11-26 RX ADMIN — DEXMEDETOMIDINE HYDROCHLORIDE IN 0.9% SODIUM CHLORIDE 4.54 MICROGRAM(S)/KG/HR: 4 INJECTION INTRAVENOUS at 08:26

## 2019-11-26 RX ADMIN — Medication 3 MILLILITER(S): at 11:04

## 2019-11-26 RX ADMIN — Medication 650 MILLIGRAM(S): at 10:44

## 2019-11-26 RX ADMIN — PHENYLEPHRINE HYDROCHLORIDE 11.19 MICROGRAM(S)/KG/MIN: 10 INJECTION INTRAVENOUS at 01:16

## 2019-11-26 RX ADMIN — SODIUM CHLORIDE 75 MILLILITER(S): 9 INJECTION, SOLUTION INTRAVENOUS at 08:27

## 2019-11-26 RX ADMIN — Medication 4: at 17:41

## 2019-11-26 RX ADMIN — Medication 2: at 12:02

## 2019-11-26 RX ADMIN — Medication 650 MILLIGRAM(S): at 21:10

## 2019-11-26 RX ADMIN — PHENYLEPHRINE HYDROCHLORIDE 11.19 MICROGRAM(S)/KG/MIN: 10 INJECTION INTRAVENOUS at 20:34

## 2019-11-26 RX ADMIN — Medication 2: at 06:32

## 2019-11-26 RX ADMIN — Medication 8.5 MICROGRAM(S)/KG/MIN: at 08:27

## 2019-11-26 RX ADMIN — ENOXAPARIN SODIUM 90 MILLIGRAM(S): 100 INJECTION SUBCUTANEOUS at 06:32

## 2019-11-26 RX ADMIN — Medication 250 MILLIGRAM(S): at 14:43

## 2019-11-26 RX ADMIN — PIPERACILLIN AND TAZOBACTAM 25 GRAM(S): 4; .5 INJECTION, POWDER, LYOPHILIZED, FOR SOLUTION INTRAVENOUS at 14:43

## 2019-11-26 RX ADMIN — PHENYLEPHRINE HYDROCHLORIDE 11.19 MICROGRAM(S)/KG/MIN: 10 INJECTION INTRAVENOUS at 08:27

## 2019-11-26 RX ADMIN — SODIUM CHLORIDE 3 MILLILITER(S): 9 INJECTION INTRAMUSCULAR; INTRAVENOUS; SUBCUTANEOUS at 17:04

## 2019-11-26 RX ADMIN — ENOXAPARIN SODIUM 80 MILLIGRAM(S): 100 INJECTION SUBCUTANEOUS at 17:43

## 2019-11-26 RX ADMIN — PROPOFOL 10.88 MICROGRAM(S)/KG/MIN: 10 INJECTION, EMULSION INTRAVENOUS at 08:26

## 2019-11-26 RX ADMIN — SODIUM CHLORIDE 3 MILLILITER(S): 9 INJECTION INTRAMUSCULAR; INTRAVENOUS; SUBCUTANEOUS at 13:02

## 2019-11-26 RX ADMIN — Medication 650 MILLIGRAM(S): at 11:44

## 2019-11-26 RX ADMIN — POTASSIUM PHOSPHATE, MONOBASIC POTASSIUM PHOSPHATE, DIBASIC 62.5 MILLIMOLE(S): 236; 224 INJECTION, SOLUTION INTRAVENOUS at 08:28

## 2019-11-26 RX ADMIN — CHLORHEXIDINE GLUCONATE 1 APPLICATION(S): 213 SOLUTION TOPICAL at 06:32

## 2019-11-26 RX ADMIN — Medication 1000 MILLIGRAM(S): at 00:05

## 2019-11-26 RX ADMIN — Medication 3 MILLILITER(S): at 17:03

## 2019-11-26 RX ADMIN — POLYETHYLENE GLYCOL 3350 17 GRAM(S): 17 POWDER, FOR SOLUTION ORAL at 17:41

## 2019-11-26 RX ADMIN — Medication 3 MILLILITER(S): at 00:05

## 2019-11-26 RX ADMIN — CHLORHEXIDINE GLUCONATE 15 MILLILITER(S): 213 SOLUTION TOPICAL at 06:31

## 2019-11-26 RX ADMIN — PIPERACILLIN AND TAZOBACTAM 25 GRAM(S): 4; .5 INJECTION, POWDER, LYOPHILIZED, FOR SOLUTION INTRAVENOUS at 06:31

## 2019-11-26 RX ADMIN — PIPERACILLIN AND TAZOBACTAM 25 GRAM(S): 4; .5 INJECTION, POWDER, LYOPHILIZED, FOR SOLUTION INTRAVENOUS at 22:26

## 2019-11-26 RX ADMIN — MIDAZOLAM HYDROCHLORIDE 1 MILLIGRAM(S): 1 INJECTION, SOLUTION INTRAMUSCULAR; INTRAVENOUS at 14:56

## 2019-11-26 RX ADMIN — SODIUM CHLORIDE 3 MILLILITER(S): 9 INJECTION INTRAMUSCULAR; INTRAVENOUS; SUBCUTANEOUS at 23:17

## 2019-11-26 RX ADMIN — FLUCONAZOLE 50 MILLIGRAM(S): 150 TABLET ORAL at 20:34

## 2019-11-26 RX ADMIN — CHLORHEXIDINE GLUCONATE 15 MILLILITER(S): 213 SOLUTION TOPICAL at 17:41

## 2019-11-26 RX ADMIN — PANTOPRAZOLE SODIUM 40 MILLIGRAM(S): 20 TABLET, DELAYED RELEASE ORAL at 22:26

## 2019-11-26 RX ADMIN — Medication 3 MILLILITER(S): at 05:54

## 2019-11-26 RX ADMIN — MIDAZOLAM HYDROCHLORIDE 1 MILLIGRAM(S): 1 INJECTION, SOLUTION INTRAMUSCULAR; INTRAVENOUS at 01:13

## 2019-11-26 RX ADMIN — Medication 100 MILLIEQUIVALENT(S): at 09:59

## 2019-11-26 NOTE — PROGRESS NOTE ADULT - ASSESSMENT
74F w/ DM, HTN, dementia/depression, ?COPD. Presents with increased lethargy, cough, and SOB. Admitted w/ acute hypoxemic respiratory failure in the setting of LLL pneumonia, as well as UTI, developed septic shock overnight.     #Neuro  - sedated with precedex and propofol  - continue w/ midazolam 1 mg q8 for chronic BDZ use  - hold other psych meds for now     #CV  - septic shock secondary to LLL pneumonia and UTI  - titrate off levophed and continue w/ phenylephrine in setting of tachycardia  - titrate MAP >/65  - HR acceptable  - full dose AC with lovenox for Afib    #Pulm  - acute hypoxemic respiratory failure secondary to LLL pna   - downtitrating FiO2, now on 40%  - thick dark secretions noted, start aggressive pulmonary toilet with metinebs and hypersal and duonebs  - RVP negative  - check sputum cx  - f/u urine legionella  - continue w/ zosyn, vancomycin, azithromycin for pneumonia     #ID  - septic shock secondary to pneumonia and UTI  - RVP negative  - f/u blood cx, urine cx, urine legionella and sputum cx  - continue w/ zosyn, vancomycin, azithromycin and diflucan for +UA wth yeast and pneumonia  - procal mildly elevated to 1.14    #Renal/Metabolic  - normal renal function  - good UOP  - hypokalemia and hypophosphatemia repleted  - will d/c 0.45 NS and start free water boluses for hypernatremia    #GI  - tolerating TF  - protonix  - start miralax    #Endo  - hyperglycemia- check FS and continue w/ ISS coverage  - TSH noted to be low, will check T3 and T4    #Heme  - noted to have slightly elevated crit although pt has been resuscitated- will monitor for now  - full dose lovenox for afib    #Dispo  - prognosis guarded  - remains critically ill on ventilator and in shock state in the ICU      - sedation with precedex and propofol  - will need BDZ since pt takes it at home   - will need to sort out psych meds pt is taking and which she is not taking  - thick secretions noted in ETT and LLL pneumonia noted on CT chest  - continue w/ vancomycin and zosyn, will add azithromycin for atypical coverage  - UA grossly positive with yeast as well   - will add diflucan as well for possible yeast UTI  - f/u blood cx, urine cx  - check RVP, urine legionella  - chest PT, duonebs ATC; no need for steroids at this time  - titrate vent settings to ABG  - noted to be in rapid afib, start metoprolol PRN, start full dose lovenox  - NGT, can start TF, protonix  - can start 0.45NS for hypernatremia   - FS, ISS 74F w/ DM, HTN, dementia/depression, ?COPD. Presents with increased lethargy, cough, and SOB. Admitted w/ acute hypoxemic respiratory failure in the setting of LLL pneumonia, as well as UTI, developed septic shock overnight.     #Neuro  - sedated with precedex and propofol  - continue w/ midazolam 1 mg q8 for chronic BDZ use  - hold other psych meds for now     #CV  - septic shock secondary to LLL pneumonia and UTI  - titrate off levophed and continue w/ phenylephrine in setting of tachycardia  - titrate MAP >/65  - HR acceptable  - full dose AC with lovenox for Afib    #Pulm  - acute hypoxemic respiratory failure secondary to LLL pna   - downtitrating FiO2, now on 40%  - thick dark secretions noted, start aggressive pulmonary toilet with metinebs and hypersal and duonebs  - RVP negative  - check sputum cx  - f/u urine legionella  - continue w/ zosyn, vancomycin, azithromycin for pneumonia     #ID  - septic shock secondary to pneumonia and UTI  - RVP negative  - f/u blood cx, urine cx, urine legionella and sputum cx  - continue w/ zosyn, vancomycin, azithromycin and diflucan for +UA wth yeast and pneumonia  - procal mildly elevated to 1.14    #Renal/Metabolic  - normal renal function  - good UOP  - hypokalemia and hypophosphatemia repleted  - will d/c 0.45 NS and start free water boluses for hypernatremia    #GI  - tolerating TF  - protonix  - start miralax    #Endo  - hyperglycemia- check FS and continue w/ ISS coverage  - TSH noted to be low, will check T3 and T4    #Heme  - noted to have slightly elevated crit although pt has been resuscitated- will monitor for now  - full dose lovenox for afib    #Dispo  - prognosis guarded  - remains critically ill on ventilator and in shock state in the ICU

## 2019-11-27 LAB
ALBUMIN SERPL ELPH-MCNC: 1.8 G/DL — LOW (ref 3.3–5)
ALP SERPL-CCNC: 95 U/L — SIGNIFICANT CHANGE UP (ref 40–120)
ALT FLD-CCNC: 9 U/L — LOW (ref 12–78)
ANION GAP SERPL CALC-SCNC: 6 MMOL/L — SIGNIFICANT CHANGE UP (ref 5–17)
AST SERPL-CCNC: 21 U/L — SIGNIFICANT CHANGE UP (ref 15–37)
BASE EXCESS BLDA CALC-SCNC: 4.6 MMOL/L — HIGH (ref -2–2)
BILIRUB SERPL-MCNC: 0.5 MG/DL — SIGNIFICANT CHANGE UP (ref 0.2–1.2)
BLOOD GAS COMMENTS: SIGNIFICANT CHANGE UP
BLOOD GAS COMMENTS: SIGNIFICANT CHANGE UP
BLOOD GAS SOURCE: SIGNIFICANT CHANGE UP
BUN SERPL-MCNC: 12 MG/DL — SIGNIFICANT CHANGE UP (ref 7–23)
CALCIUM SERPL-MCNC: 8.1 MG/DL — LOW (ref 8.5–10.1)
CHLORIDE SERPL-SCNC: 120 MMOL/L — HIGH (ref 96–108)
CO2 SERPL-SCNC: 25 MMOL/L — SIGNIFICANT CHANGE UP (ref 22–31)
CREAT SERPL-MCNC: 0.43 MG/DL — LOW (ref 0.5–1.3)
CULTURE RESULTS: SIGNIFICANT CHANGE UP
GLUCOSE BLDC GLUCOMTR-MCNC: 192 MG/DL — HIGH (ref 70–99)
GLUCOSE BLDC GLUCOMTR-MCNC: 193 MG/DL — HIGH (ref 70–99)
GLUCOSE BLDC GLUCOMTR-MCNC: 198 MG/DL — HIGH (ref 70–99)
GLUCOSE SERPL-MCNC: 166 MG/DL — HIGH (ref 70–99)
HCO3 BLDA-SCNC: 29 MMOL/L — SIGNIFICANT CHANGE UP (ref 21–29)
HCT VFR BLD CALC: 47.8 % — HIGH (ref 34.5–45)
HGB BLD-MCNC: 14.5 G/DL — SIGNIFICANT CHANGE UP (ref 11.5–15.5)
HOROWITZ INDEX BLDA+IHG-RTO: 40 — SIGNIFICANT CHANGE UP
MAGNESIUM SERPL-MCNC: 1.9 MG/DL — SIGNIFICANT CHANGE UP (ref 1.6–2.6)
MCHC RBC-ENTMCNC: 28.8 PG — SIGNIFICANT CHANGE UP (ref 27–34)
MCHC RBC-ENTMCNC: 30.3 GM/DL — LOW (ref 32–36)
MCV RBC AUTO: 94.8 FL — SIGNIFICANT CHANGE UP (ref 80–100)
NRBC # BLD: 0 /100 WBCS — SIGNIFICANT CHANGE UP (ref 0–0)
PCO2 BLDA: 43 MMHG — SIGNIFICANT CHANGE UP (ref 32–46)
PH BLD: 7.44 — SIGNIFICANT CHANGE UP (ref 7.35–7.45)
PHOSPHATE SERPL-MCNC: 1.4 MG/DL — LOW (ref 2.5–4.5)
PLATELET # BLD AUTO: 212 K/UL — SIGNIFICANT CHANGE UP (ref 150–400)
PO2 BLDA: 82 MMHG — SIGNIFICANT CHANGE UP (ref 74–108)
POTASSIUM SERPL-MCNC: 3.5 MMOL/L — SIGNIFICANT CHANGE UP (ref 3.5–5.3)
POTASSIUM SERPL-SCNC: 3.5 MMOL/L — SIGNIFICANT CHANGE UP (ref 3.5–5.3)
PROT SERPL-MCNC: 5.5 GM/DL — LOW (ref 6–8.3)
RBC # BLD: 5.04 M/UL — SIGNIFICANT CHANGE UP (ref 3.8–5.2)
RBC # FLD: 15.4 % — HIGH (ref 10.3–14.5)
SAO2 % BLDA: 97 % — HIGH (ref 92–96)
SODIUM SERPL-SCNC: 151 MMOL/L — HIGH (ref 135–145)
SPECIMEN SOURCE: SIGNIFICANT CHANGE UP
T3 SERPL-MCNC: 74 NG/DL — LOW (ref 80–200)
T4 AB SER-ACNC: 5.3 UG/DL — SIGNIFICANT CHANGE UP (ref 4.6–12)
VANCOMYCIN TROUGH SERPL-MCNC: 5.3 UG/ML — LOW (ref 10–20)
WBC # BLD: 21.29 K/UL — HIGH (ref 3.8–10.5)
WBC # FLD AUTO: 21.29 K/UL — HIGH (ref 3.8–10.5)

## 2019-11-27 PROCEDURE — 99231 SBSQ HOSP IP/OBS SF/LOW 25: CPT

## 2019-11-27 RX ORDER — POTASSIUM PHOSPHATE, MONOBASIC POTASSIUM PHOSPHATE, DIBASIC 236; 224 MG/ML; MG/ML
30 INJECTION, SOLUTION INTRAVENOUS ONCE
Refills: 0 | Status: COMPLETED | OUTPATIENT
Start: 2019-11-27 | End: 2019-11-27

## 2019-11-27 RX ADMIN — Medication 3 MILLILITER(S): at 05:19

## 2019-11-27 RX ADMIN — SODIUM CHLORIDE 3 MILLILITER(S): 9 INJECTION INTRAMUSCULAR; INTRAVENOUS; SUBCUTANEOUS at 05:19

## 2019-11-27 RX ADMIN — Medication 3 MILLILITER(S): at 11:58

## 2019-11-27 RX ADMIN — Medication 250 MILLIGRAM(S): at 04:28

## 2019-11-27 RX ADMIN — PANTOPRAZOLE SODIUM 40 MILLIGRAM(S): 20 TABLET, DELAYED RELEASE ORAL at 12:26

## 2019-11-27 RX ADMIN — Medication 650 MILLIGRAM(S): at 15:10

## 2019-11-27 RX ADMIN — Medication 650 MILLIGRAM(S): at 14:40

## 2019-11-27 RX ADMIN — CHLORHEXIDINE GLUCONATE 15 MILLILITER(S): 213 SOLUTION TOPICAL at 17:16

## 2019-11-27 RX ADMIN — POLYETHYLENE GLYCOL 3350 17 GRAM(S): 17 POWDER, FOR SOLUTION ORAL at 17:26

## 2019-11-27 RX ADMIN — Medication 2: at 05:43

## 2019-11-27 RX ADMIN — CHLORHEXIDINE GLUCONATE 1 APPLICATION(S): 213 SOLUTION TOPICAL at 05:43

## 2019-11-27 RX ADMIN — PIPERACILLIN AND TAZOBACTAM 25 GRAM(S): 4; .5 INJECTION, POWDER, LYOPHILIZED, FOR SOLUTION INTRAVENOUS at 23:00

## 2019-11-27 RX ADMIN — SODIUM CHLORIDE 3 MILLILITER(S): 9 INJECTION INTRAMUSCULAR; INTRAVENOUS; SUBCUTANEOUS at 18:28

## 2019-11-27 RX ADMIN — ENOXAPARIN SODIUM 80 MILLIGRAM(S): 100 INJECTION SUBCUTANEOUS at 05:34

## 2019-11-27 RX ADMIN — Medication 2: at 17:24

## 2019-11-27 RX ADMIN — Medication 2: at 12:25

## 2019-11-27 RX ADMIN — SODIUM CHLORIDE 3 MILLILITER(S): 9 INJECTION INTRAMUSCULAR; INTRAVENOUS; SUBCUTANEOUS at 12:00

## 2019-11-27 RX ADMIN — ENOXAPARIN SODIUM 80 MILLIGRAM(S): 100 INJECTION SUBCUTANEOUS at 17:13

## 2019-11-27 RX ADMIN — Medication 3 MILLILITER(S): at 18:27

## 2019-11-27 RX ADMIN — PIPERACILLIN AND TAZOBACTAM 25 GRAM(S): 4; .5 INJECTION, POWDER, LYOPHILIZED, FOR SOLUTION INTRAVENOUS at 14:25

## 2019-11-27 RX ADMIN — CHLORHEXIDINE GLUCONATE 15 MILLILITER(S): 213 SOLUTION TOPICAL at 05:34

## 2019-11-27 RX ADMIN — PHENYLEPHRINE HYDROCHLORIDE 11.19 MICROGRAM(S)/KG/MIN: 10 INJECTION INTRAVENOUS at 00:09

## 2019-11-27 RX ADMIN — PHENYLEPHRINE HYDROCHLORIDE 11.19 MICROGRAM(S)/KG/MIN: 10 INJECTION INTRAVENOUS at 07:57

## 2019-11-27 RX ADMIN — PIPERACILLIN AND TAZOBACTAM 25 GRAM(S): 4; .5 INJECTION, POWDER, LYOPHILIZED, FOR SOLUTION INTRAVENOUS at 05:36

## 2019-11-27 RX ADMIN — Medication 650 MILLIGRAM(S): at 07:33

## 2019-11-27 RX ADMIN — FLUCONAZOLE 50 MILLIGRAM(S): 150 TABLET ORAL at 20:22

## 2019-11-27 RX ADMIN — PHENYLEPHRINE HYDROCHLORIDE 11.19 MICROGRAM(S)/KG/MIN: 10 INJECTION INTRAVENOUS at 04:28

## 2019-11-27 RX ADMIN — POTASSIUM PHOSPHATE, MONOBASIC POTASSIUM PHOSPHATE, DIBASIC 83.33 MILLIMOLE(S): 236; 224 INJECTION, SOLUTION INTRAVENOUS at 08:37

## 2019-11-27 RX ADMIN — Medication 650 MILLIGRAM(S): at 08:00

## 2019-11-27 RX ADMIN — PHENYLEPHRINE HYDROCHLORIDE 11.19 MICROGRAM(S)/KG/MIN: 10 INJECTION INTRAVENOUS at 18:05

## 2019-11-27 RX ADMIN — Medication 2: at 00:10

## 2019-11-27 RX ADMIN — PHENYLEPHRINE HYDROCHLORIDE 11.19 MICROGRAM(S)/KG/MIN: 10 INJECTION INTRAVENOUS at 23:51

## 2019-11-27 NOTE — PROGRESS NOTE ADULT - SUBJECTIVE AND OBJECTIVE BOX
HPI:  75 yo F bibems for acute resp failure.  Pt. has been reportedly sob and altered for 3 days.  Patient can not give further history due to condition.  Noted to be hypoxic  and blue colored.  No other complaints or inciting event.  No family at bedside now. Disoriented for last few days 4 days, sleeping, coughing with mucous and congestion, can't swallow pills.  Pt. has 24 hour HHA, lives with son. As per son increased secretions last few day, Increased lethargy.  Multiple visits in last month for UTI.  In ER noted to be hypoxic requiring intubation. Received 3 liter IV fluid Lacate 2.3.  ICU called for evaluation and management (25 Nov 2019 16:53)      24 hr events:      ## ROS:  [ ] unable to obtain  CONSTITUTIONAL: No fever, weight loss, or fatigue  EYES: No eye pain, visual disturbances, or discharge  ENMT:  No difficulty hearing, tinnitus, vertigo; No sinus or throat pain  NECK: No pain or stiffness  RESPIRATORY: No cough, wheezing, chills or hemoptysis; No shortness of breath  CARDIOVASCULAR: No chest pain, palpitations, dizziness, or leg swelling  GASTROINTESTINAL: No abdominal or epigastric pain. No nausea, vomiting, or hematemesis; No diarrhea or constipation. No melena or hematochezia.  GENITOURINARY: No dysuria, frequency, hematuria, or incontinence  NEUROLOGICAL: No headaches, memory loss, loss of strength, numbness, or tremors  SKIN: No itching, burning, rashes, or lesions   LYMPH NODES: No enlarged glands  ENDOCRINE: No heat or cold intolerance; No hair loss  MUSCULOSKELETAL: No joint pain or swelling; No muscle, back, or extremity pain  PSYCHIATRIC: No depression, anxiety, mood swings, or difficulty sleeping  HEME/LYMPH: No easy bruising, or bleeding gums  ALLERGY AND IMMUNOLOGIC: No hives or eczema    ## Labs:  CBC:                        14.5   21.29 )-----------( 212      ( 27 Nov 2019 04:33 )             47.8     Chem:  11-27    151<H>  |  120<H>  |  12  ----------------------------<  166<H>  3.5   |  25  |  0.43<L>    Ca    8.1<L>      27 Nov 2019 04:33  Phos  1.4     11-27  Mg     1.9     11-27    TPro  5.5<L>  /  Alb  1.8<L>  /  TBili  0.5  /  DBili  x   /  AST  21  /  ALT  9<L>  /  AlkPhos  95  11-27    Coags:          ## Imaging:    ## Medications:  fluconAZOLE IVPB 100 milliGRAM(s) IV Intermittent <User Schedule>  piperacillin/tazobactam IVPB.. 3.375 Gram(s) IV Intermittent every 8 hours    phenylephrine    Infusion 0.4 MICROgram(s)/kG/Min IV Continuous <Continuous>    albuterol/ipratropium for Nebulization 3 milliLiter(s) Nebulizer every 6 hours  sodium chloride 3%  Inhalation 3 milliLiter(s) Inhalation every 6 hours    dextrose 40% Gel 15 Gram(s) Oral once PRN  dextrose 50% Injectable 12.5 Gram(s) IV Push once  dextrose 50% Injectable 25 Gram(s) IV Push once  dextrose 50% Injectable 25 Gram(s) IV Push once  glucagon  Injectable 1 milliGRAM(s) IntraMuscular once PRN  insulin lispro (HumaLOG) corrective regimen sliding scale   SubCutaneous every 6 hours    enoxaparin Injectable 80 milliGRAM(s) SubCutaneous every 12 hours    pantoprazole   Suspension 40 milliGRAM(s) Oral daily  polyethylene glycol 3350 17 Gram(s) Oral daily    acetaminophen    Suspension .. 650 milliGRAM(s) Oral every 6 hours PRN  dexMEDEtomidine Infusion 0.2 MICROgram(s)/kG/Hr IV Continuous <Continuous>  midazolam Injectable 1 milliGRAM(s) IV Push every 8 hours  propofol Infusion 20 MICROgram(s)/kG/Min IV Continuous <Continuous>      ## Vitals:  T(C): 38.5 (11-27-19 @ 19:04), Max: 38.9 (11-27-19 @ 07:33)  HR: 98 (11-27-19 @ 23:00) (97 - 145)  BP: 127/61 (11-27-19 @ 23:00) (83/48 - 136/67)  BP(mean): 76 (11-27-19 @ 23:00) (48 - 85)  RR: 18 (11-27-19 @ 23:00) (17 - 36)  SpO2: 98% (11-27-19 @ 23:00) (93% - 100%)  Wt(kg): --  Vent: Mode: AC/ CMV (Assist Control/ Continuous Mandatory Ventilation), RR (machine): 18, RR (patient): 22, TV (machine): 400, FiO2: 40, PEEP: 5, PIP: 20  ABG: ABG - ( 27 Nov 2019 08:12 )  pH, Arterial: x     pH, Blood: 7.44  /  pCO2: 43    /  pO2: 82    / HCO3: 29    / Base Excess: 4.6   /  SaO2: 97                    11-26 @ 07:01 - 11-27 @ 07:00  --------------------------------------------------------  IN: 4718.3 mL / OUT: 3155 mL / NET: 1563.3 mL    11-27 @ 07:01 - 11-27 @ 23:27  --------------------------------------------------------  IN: 1622.9 mL / OUT: 1540 mL / NET: 82.9 mL          ## P/E:  Gen: lying comfortably in bed in no apparent distress  HEENT: PERRL, EOMI  Resp: CTA B/L no c/r/w  CVS: S1S2 no m/r/g  Abd: soft NT/ND +BS  Ext: no c/c/e  Neuro: A&Ox3    CENTRAL LINE: [ ] YES [ ] NO  LOCATION:   DATE INSERTED:  REMOVE: [ ] YES [ ] NO      [place]: [ ] YES [ ] NO    DATE INSERTED:  REMOVE:  [ ] YES [ ] NO      A-LINE:  [ ] YES [ ] NO  LOCATION:   DATE INSERTED:  REMOVE:  [ ] YES [ ] NO  EXPLAIN:    GLOBAL ISSUE/BEST PRACTICE:  Analgesia:  Sedation:  HOB elevation: yes  Stress ulcer prophylaxis:  VTE prophylaxis:  Oral Care:  Glycemic control:  Nutrition:    CODE STATUS: [ ] full code  [ ] DNR  [ ] DNI  [ ] [facility]  Goals of care discussion: [ ] yes

## 2019-11-27 NOTE — DIETITIAN INITIAL EVALUATION ADULT. - PHYSICAL APPEARANCE
BMI=30.7; +2 gen edema/obese obese/BMI=30.7; +2 gen edema/other (specify) Limited Nutrition focused physical exam conducted due to medical acuity: Subcutaneous fat loss: [WNL ] Orbital fat pads region, [unable; intubated ]Buccal fat region, [ unable]Triceps region,  [Unable ]Ribs region.  Muscle wasting: [WNL ]Temples region, [WNL ]Clavicle region, [WNL ]Shoulder region, [unable ]Scapula region, [Unable ]Interosseous region,  [Unable]thigh region, [Unable ]Calf region

## 2019-11-27 NOTE — DIETITIAN INITIAL EVALUATION ADULT. - PERTINENT LABORATORY DATA
11-27 Na 151 mmol/L<H> Glu 166 mg/dL<H> K+ 3.5 mmol/L Cr 0.43 mg/dL<L> BUN 12 mg/dL Phos 1.4 mg/dL<L> Alb 1.8 g/dL<L> PAB n/a   Hgb 14.5 g/dL Hct 47.8 %<H> HgA1C 7.3%(11/26)   Glucose, Serum: 166 mg/dL <H>, WBC=21.29(11/27)   24Hr FS:193 mg/dL  192 mg/dL  199 mg/dL  204 mg/dL

## 2019-11-27 NOTE — DIETITIAN INITIAL EVALUATION ADULT. - ENTERAL
Recommend Glucerna 1.2 @ 50ml/dv=1462ni, 1440kcal & 72gm protein to further meet nutritional needs Recommend Glucerna 1.2 @ 50ml/hr via ZUK=3224bs, 1440kcal & 72gm protein to further meet nutritional needs

## 2019-11-27 NOTE — DIETITIAN INITIAL EVALUATION ADULT. - OTHER INFO
Pt intubated on mechanical vent support. Obtained wt & diet hx from daughter & son @ bedside. Pt with hx COPD, Dementia lives with son & HHA. Pt's daughter does food shopping & daughter in law & HHA does cooking. Pt with incraesed lethargy, cough, SOB found hypoxic now on mech vent support due to pneumonia & UTI. Pt tolerating NGT feeding. Last BM ? & Residuals=10-20ml.    Pt with recent hospitalization 2 months ago; Pt's diabetic diet compliance improved since last hospitalization to avoid starting insulin. Pt with intentional wt loss 23kg (22%) since last known wt 5 months ago. Pt decreased intake high CHO & high fat foods (avoiding pizza, crumb cake, pies, chinese food) However, family states pt with decreased appetite & po intake x 1 1/2 weeks due to increased lethargy. Pt prefers soft foods/Diabetic diet eg. beef stew, chicken, Pt intubated on mechanical vent support. Obtained wt & diet hx from daughter & son @ bedside. Pt with hx COPD, Dementia lives with son & HHA. Pt's daughter does food shopping & daughter in law & HHA does cooking. Pt with increased lethargy, cough, SOB found hypoxic now on mech vent support due to pneumonia & UTI. Pt tolerating NGT feeding. Last BM ? & Residuals=10-20ml. S/p IVF bolus 3 liters upon admission.    Pt with recent hospitalization 2 months ago; Pt's diabetic diet compliance improved since last hospitalization to avoid starting insulin. Pt with intentional wt loss 23kg (22%) since last known wt 5 months ago. Pt decreased intake high CHO & high fat foods (avoiding pizza, crumb cake, pies, chinese food) However, family states pt with decreased appetite & po intake x 1 1/2 weeks due to increased lethargy. Pt prefers soft foods/Diabetic diet PTA eg. beef stew, chicken,

## 2019-11-27 NOTE — DIETITIAN INITIAL EVALUATION ADULT. - PERTINENT MEDS FT
acetaminophen    Suspension .. PRN  albuterol/ipratropium for Nebulization  chlorhexidine 0.12% Liquid  chlorhexidine 4% Liquid  dexMEDEtomidine Infusion  dextrose 40% Gel PRN  dextrose 5%.  dextrose 50% Injectable  dextrose 50% Injectable  dextrose 50% Injectable  enoxaparin Injectable  fluconAZOLE IVPB  glucagon  Injectable PRN  insulin lispro (HumaLOG) corrective regimen sliding scale  midazolam Injectable  pantoprazole   Suspension  phenylephrine    Infusion  piperacillin/tazobactam IVPB..  polyethylene glycol 3350  propofol Infusion  sodium chloride 3%  Inhalation  vancomycin  IVPB

## 2019-11-27 NOTE — DIETITIAN INITIAL EVALUATION ADULT. - ENERGY INTAKE
Pt receiving NGT feeding Vital AF 1.2 @ 50ml/hr. Pt received total volume 899ml x 24 [id number]=[id number] & 74gm protein x 24 hours. Receiving tube feeding and tolerating @ goal rate Pt receiving NGT feeding Vital AF 1.2 @ 50ml/hr. Pt received total volume 899ml x 24 [id number] & 74gm protein x 24 hours. Pt received 100% energy & protein needs x 24 hours.

## 2019-11-28 LAB
-  AMPICILLIN/SULBACTAM: SIGNIFICANT CHANGE UP
-  CEFAZOLIN: SIGNIFICANT CHANGE UP
-  CLINDAMYCIN: SIGNIFICANT CHANGE UP
-  ERYTHROMYCIN: SIGNIFICANT CHANGE UP
-  GENTAMICIN: SIGNIFICANT CHANGE UP
-  OXACILLIN: SIGNIFICANT CHANGE UP
-  PENICILLIN: SIGNIFICANT CHANGE UP
-  RIFAMPIN: SIGNIFICANT CHANGE UP
-  TETRACYCLINE: SIGNIFICANT CHANGE UP
-  TRIMETHOPRIM/SULFAMETHOXAZOLE: SIGNIFICANT CHANGE UP
-  VANCOMYCIN: SIGNIFICANT CHANGE UP
ALBUMIN SERPL ELPH-MCNC: 2 G/DL — LOW (ref 3.3–5)
ALP SERPL-CCNC: 108 U/L — SIGNIFICANT CHANGE UP (ref 40–120)
ALT FLD-CCNC: 11 U/L — LOW (ref 12–78)
ANION GAP SERPL CALC-SCNC: 6 MMOL/L — SIGNIFICANT CHANGE UP (ref 5–17)
ANION GAP SERPL CALC-SCNC: 6 MMOL/L — SIGNIFICANT CHANGE UP (ref 5–17)
AST SERPL-CCNC: 13 U/L — LOW (ref 15–37)
BILIRUB SERPL-MCNC: 0.5 MG/DL — SIGNIFICANT CHANGE UP (ref 0.2–1.2)
BUN SERPL-MCNC: 15 MG/DL — SIGNIFICANT CHANGE UP (ref 7–23)
BUN SERPL-MCNC: 17 MG/DL — SIGNIFICANT CHANGE UP (ref 7–23)
CALCIUM SERPL-MCNC: 8.4 MG/DL — LOW (ref 8.5–10.1)
CALCIUM SERPL-MCNC: 8.7 MG/DL — SIGNIFICANT CHANGE UP (ref 8.5–10.1)
CHLORIDE SERPL-SCNC: 115 MMOL/L — HIGH (ref 96–108)
CHLORIDE SERPL-SCNC: 115 MMOL/L — HIGH (ref 96–108)
CO2 SERPL-SCNC: 28 MMOL/L — SIGNIFICANT CHANGE UP (ref 22–31)
CO2 SERPL-SCNC: 29 MMOL/L — SIGNIFICANT CHANGE UP (ref 22–31)
CREAT SERPL-MCNC: 0.34 MG/DL — LOW (ref 0.5–1.3)
CREAT SERPL-MCNC: 0.43 MG/DL — LOW (ref 0.5–1.3)
CULTURE RESULTS: SIGNIFICANT CHANGE UP
GLUCOSE BLDC GLUCOMTR-MCNC: 181 MG/DL — HIGH (ref 70–99)
GLUCOSE BLDC GLUCOMTR-MCNC: 210 MG/DL — HIGH (ref 70–99)
GLUCOSE BLDC GLUCOMTR-MCNC: 211 MG/DL — HIGH (ref 70–99)
GLUCOSE BLDC GLUCOMTR-MCNC: 220 MG/DL — HIGH (ref 70–99)
GLUCOSE SERPL-MCNC: 197 MG/DL — HIGH (ref 70–99)
GLUCOSE SERPL-MCNC: 255 MG/DL — HIGH (ref 70–99)
GRAM STN FLD: SIGNIFICANT CHANGE UP
HCT VFR BLD CALC: 43.7 % — SIGNIFICANT CHANGE UP (ref 34.5–45)
HGB BLD-MCNC: 13.1 G/DL — SIGNIFICANT CHANGE UP (ref 11.5–15.5)
MAGNESIUM SERPL-MCNC: 2.3 MG/DL — SIGNIFICANT CHANGE UP (ref 1.6–2.6)
MCHC RBC-ENTMCNC: 27.9 PG — SIGNIFICANT CHANGE UP (ref 27–34)
MCHC RBC-ENTMCNC: 30 GM/DL — LOW (ref 32–36)
MCV RBC AUTO: 93 FL — SIGNIFICANT CHANGE UP (ref 80–100)
METHOD TYPE: SIGNIFICANT CHANGE UP
NRBC # BLD: 0 /100 WBCS — SIGNIFICANT CHANGE UP (ref 0–0)
ORGANISM # SPEC MICROSCOPIC CNT: SIGNIFICANT CHANGE UP
ORGANISM # SPEC MICROSCOPIC CNT: SIGNIFICANT CHANGE UP
PHOSPHATE SERPL-MCNC: 1.6 MG/DL — LOW (ref 2.5–4.5)
PHOSPHATE SERPL-MCNC: 1.9 MG/DL — LOW (ref 2.5–4.5)
PLATELET # BLD AUTO: 195 K/UL — SIGNIFICANT CHANGE UP (ref 150–400)
POTASSIUM SERPL-MCNC: 3.1 MMOL/L — LOW (ref 3.5–5.3)
POTASSIUM SERPL-MCNC: 4.1 MMOL/L — SIGNIFICANT CHANGE UP (ref 3.5–5.3)
POTASSIUM SERPL-SCNC: 3.1 MMOL/L — LOW (ref 3.5–5.3)
POTASSIUM SERPL-SCNC: 4.1 MMOL/L — SIGNIFICANT CHANGE UP (ref 3.5–5.3)
PROT SERPL-MCNC: 6.6 GM/DL — SIGNIFICANT CHANGE UP (ref 6–8.3)
RBC # BLD: 4.7 M/UL — SIGNIFICANT CHANGE UP (ref 3.8–5.2)
RBC # FLD: 15.4 % — HIGH (ref 10.3–14.5)
SODIUM SERPL-SCNC: 149 MMOL/L — HIGH (ref 135–145)
SODIUM SERPL-SCNC: 150 MMOL/L — HIGH (ref 135–145)
SPECIMEN SOURCE: SIGNIFICANT CHANGE UP
WBC # BLD: 15.73 K/UL — HIGH (ref 3.8–10.5)
WBC # FLD AUTO: 15.73 K/UL — HIGH (ref 3.8–10.5)

## 2019-11-28 PROCEDURE — 99291 CRITICAL CARE FIRST HOUR: CPT

## 2019-11-28 PROCEDURE — 71045 X-RAY EXAM CHEST 1 VIEW: CPT | Mod: 26

## 2019-11-28 RX ORDER — POTASSIUM PHOSPHATE, MONOBASIC POTASSIUM PHOSPHATE, DIBASIC 236; 224 MG/ML; MG/ML
30 INJECTION, SOLUTION INTRAVENOUS ONCE
Refills: 0 | Status: COMPLETED | OUTPATIENT
Start: 2019-11-28 | End: 2019-11-28

## 2019-11-28 RX ORDER — POTASSIUM CHLORIDE 20 MEQ
40 PACKET (EA) ORAL EVERY 4 HOURS
Refills: 0 | Status: DISCONTINUED | OUTPATIENT
Start: 2019-11-28 | End: 2019-11-28

## 2019-11-28 RX ORDER — AZITHROMYCIN 500 MG/1
500 TABLET, FILM COATED ORAL EVERY 24 HOURS
Refills: 0 | Status: COMPLETED | OUTPATIENT
Start: 2019-11-28 | End: 2019-12-01

## 2019-11-28 RX ORDER — POTASSIUM CHLORIDE 20 MEQ
40 PACKET (EA) ORAL EVERY 4 HOURS
Refills: 0 | Status: COMPLETED | OUTPATIENT
Start: 2019-11-28 | End: 2019-11-28

## 2019-11-28 RX ORDER — AZITHROMYCIN 500 MG/1
500 TABLET, FILM COATED ORAL EVERY 24 HOURS
Refills: 0 | Status: DISCONTINUED | OUTPATIENT
Start: 2019-11-28 | End: 2019-11-28

## 2019-11-28 RX ADMIN — FLUCONAZOLE 50 MILLIGRAM(S): 150 TABLET ORAL at 20:57

## 2019-11-28 RX ADMIN — PANTOPRAZOLE SODIUM 40 MILLIGRAM(S): 20 TABLET, DELAYED RELEASE ORAL at 11:37

## 2019-11-28 RX ADMIN — Medication 650 MILLIGRAM(S): at 21:14

## 2019-11-28 RX ADMIN — PIPERACILLIN AND TAZOBACTAM 25 GRAM(S): 4; .5 INJECTION, POWDER, LYOPHILIZED, FOR SOLUTION INTRAVENOUS at 22:48

## 2019-11-28 RX ADMIN — Medication 40 MILLIEQUIVALENT(S): at 09:23

## 2019-11-28 RX ADMIN — CHLORHEXIDINE GLUCONATE 15 MILLILITER(S): 213 SOLUTION TOPICAL at 05:12

## 2019-11-28 RX ADMIN — SODIUM CHLORIDE 3 MILLILITER(S): 9 INJECTION INTRAMUSCULAR; INTRAVENOUS; SUBCUTANEOUS at 17:33

## 2019-11-28 RX ADMIN — Medication 650 MILLIGRAM(S): at 14:31

## 2019-11-28 RX ADMIN — POLYETHYLENE GLYCOL 3350 17 GRAM(S): 17 POWDER, FOR SOLUTION ORAL at 18:02

## 2019-11-28 RX ADMIN — ENOXAPARIN SODIUM 80 MILLIGRAM(S): 100 INJECTION SUBCUTANEOUS at 17:39

## 2019-11-28 RX ADMIN — Medication 4: at 23:11

## 2019-11-28 RX ADMIN — POTASSIUM PHOSPHATE, MONOBASIC POTASSIUM PHOSPHATE, DIBASIC 83.33 MILLIMOLE(S): 236; 224 INJECTION, SOLUTION INTRAVENOUS at 05:49

## 2019-11-28 RX ADMIN — MIDAZOLAM HYDROCHLORIDE 1 MILLIGRAM(S): 1 INJECTION, SOLUTION INTRAMUSCULAR; INTRAVENOUS at 14:31

## 2019-11-28 RX ADMIN — Medication 3 MILLILITER(S): at 23:19

## 2019-11-28 RX ADMIN — SODIUM CHLORIDE 3 MILLILITER(S): 9 INJECTION INTRAMUSCULAR; INTRAVENOUS; SUBCUTANEOUS at 11:39

## 2019-11-28 RX ADMIN — Medication 3 MILLILITER(S): at 11:39

## 2019-11-28 RX ADMIN — PHENYLEPHRINE HYDROCHLORIDE 11.19 MICROGRAM(S)/KG/MIN: 10 INJECTION INTRAVENOUS at 20:14

## 2019-11-28 RX ADMIN — MIDAZOLAM HYDROCHLORIDE 1 MILLIGRAM(S): 1 INJECTION, SOLUTION INTRAMUSCULAR; INTRAVENOUS at 22:47

## 2019-11-28 RX ADMIN — Medication 3 MILLILITER(S): at 05:26

## 2019-11-28 RX ADMIN — POTASSIUM PHOSPHATE, MONOBASIC POTASSIUM PHOSPHATE, DIBASIC 83.33 MILLIMOLE(S): 236; 224 INJECTION, SOLUTION INTRAVENOUS at 20:14

## 2019-11-28 RX ADMIN — Medication 650 MILLIGRAM(S): at 15:19

## 2019-11-28 RX ADMIN — SODIUM CHLORIDE 3 MILLILITER(S): 9 INJECTION INTRAMUSCULAR; INTRAVENOUS; SUBCUTANEOUS at 00:42

## 2019-11-28 RX ADMIN — CHLORHEXIDINE GLUCONATE 1 APPLICATION(S): 213 SOLUTION TOPICAL at 00:30

## 2019-11-28 RX ADMIN — PIPERACILLIN AND TAZOBACTAM 25 GRAM(S): 4; .5 INJECTION, POWDER, LYOPHILIZED, FOR SOLUTION INTRAVENOUS at 14:31

## 2019-11-28 RX ADMIN — ENOXAPARIN SODIUM 80 MILLIGRAM(S): 100 INJECTION SUBCUTANEOUS at 05:13

## 2019-11-28 RX ADMIN — Medication 2: at 00:03

## 2019-11-28 RX ADMIN — SODIUM CHLORIDE 3 MILLILITER(S): 9 INJECTION INTRAMUSCULAR; INTRAVENOUS; SUBCUTANEOUS at 23:20

## 2019-11-28 RX ADMIN — Medication 650 MILLIGRAM(S): at 21:02

## 2019-11-28 RX ADMIN — Medication 3 MILLILITER(S): at 00:41

## 2019-11-28 RX ADMIN — PIPERACILLIN AND TAZOBACTAM 25 GRAM(S): 4; .5 INJECTION, POWDER, LYOPHILIZED, FOR SOLUTION INTRAVENOUS at 05:12

## 2019-11-28 RX ADMIN — AZITHROMYCIN 255 MILLIGRAM(S): 500 TABLET, FILM COATED ORAL at 09:32

## 2019-11-28 RX ADMIN — Medication 4: at 11:41

## 2019-11-28 RX ADMIN — Medication 40 MILLIEQUIVALENT(S): at 06:00

## 2019-11-28 RX ADMIN — CHLORHEXIDINE GLUCONATE 15 MILLILITER(S): 213 SOLUTION TOPICAL at 17:39

## 2019-11-28 RX ADMIN — Medication 3 MILLILITER(S): at 17:29

## 2019-11-28 RX ADMIN — SODIUM CHLORIDE 3 MILLILITER(S): 9 INJECTION INTRAMUSCULAR; INTRAVENOUS; SUBCUTANEOUS at 05:26

## 2019-11-28 RX ADMIN — PHENYLEPHRINE HYDROCHLORIDE 11.19 MICROGRAM(S)/KG/MIN: 10 INJECTION INTRAVENOUS at 09:22

## 2019-11-28 RX ADMIN — Medication 2: at 05:31

## 2019-11-28 RX ADMIN — Medication 4: at 17:40

## 2019-11-28 NOTE — PROGRESS NOTE ADULT - ASSESSMENT
74F w/ DM, HTN, dementia/depression, ?COPD. Presents with increased lethargy, cough, and SOB. Admitted w/ acute hypoxemic respiratory failure in the setting of LLL pneumonia, UTI, septic shock.     #Neuro  - off all sedation - opens eyes to painful stimuli and starting to follow some commands  - continue w/ midazolam 1 mg q8 for chronic BDZ use  - hold other psych meds for now     #CV  - septic shock secondary to LLL pneumonia and UTI  - decreasing doses of phenylepherine  - titrate MAP >/65  - HR acceptable  - full dose AC with lovenox for Afib    #Pulm  - acute hypoxemic respiratory failure secondary to LLL pna   - secretions improving, continue w/ pulmonary toilet  - sputum cx growing few GPC and yeast  - continue w/ zosyn and fluconazole- no need for vancomycin  - will continue azithromycin for anti-inflammatory for 5 days  - PS weaning trial today  - CXR today    #ID  - septic shock secondary to pneumonia and UTI  - blood cx NGTD, urine cx <50K yeast, sputum growing few GPC and yeast  - on zosyn and fluconazole - for UTI/pneumonia  - plan for 5-7 days of zosyn and d/c fluconazole tomorrow    #Renal/Metabolic  - normal renal function  - good UOP  - hypokalemia and hypophosphatemia repleted  - repeat BMP this afternoon  - increase free water for hypernatremia    #GI  - tolerating TF  - protonix  - start miralax    #Endo  - FS mildly elevated - continue w/ FS and continue w/ ISS coverage  - TSH noted to be low, T3 mildly low and T4 normal - can be followed up as outpt    #Heme  - full dose lovenox for afib    #Dispo  - prognosis guarded  - remains critically ill on ventilator and in shock state in the ICU

## 2019-11-28 NOTE — PROGRESS NOTE ADULT - SUBJECTIVE AND OBJECTIVE BOX
CHIEF COMPLAINT:    Interval Events:  febrile to 101.3 yesterday evening  decreasing doses of phenylepherine  pulmonary secretions improved    REVIEW OF SYSTEMS:  [ x] Unable to assess ROS because intubated/sedated    OBJECTIVE:  ICU Vital Signs Last 24 Hrs  T(C): 37.5 (28 Nov 2019 11:30), Max: 38.8 (27 Nov 2019 17:30)  T(F): 99.5 (28 Nov 2019 11:30), Max: 101.8 (27 Nov 2019 17:30)  HR: 112 (28 Nov 2019 12:08) (79 - 126)  BP: 98/67 (28 Nov 2019 11:30) (86/68 - 140/82)  BP(mean): 73 (28 Nov 2019 11:30) (62 - 96)  ABP: --  ABP(mean): --  RR: 26 (28 Nov 2019 11:30) (16 - 36)  SpO2: 95% (28 Nov 2019 12:08) (93% - 100%)    Mode: CPAP with PS, FiO2: 40, PEEP: 5, PS: 5, MAP: 6, PIP: 11    11-27 @ 07:01 - 11-28 @ 07:00  --------------------------------------------------------  IN: 3948.9 mL / OUT: 2715 mL / NET: 1233.9 mL    11-28 @ 07:01 - 11-28 @ 12:47  --------------------------------------------------------  IN: 476.8 mL / OUT: 525 mL / NET: -48.2 mL      CAPILLARY BLOOD GLUCOSE      POCT Blood Glucose.: 211 mg/dL (28 Nov 2019 11:39)      PHYSICAL EXAM:  General: NAD, non toxic appearing  HEENT: ETT in place  Neck: supple  Respiratory: course BS, no wheezing  Cardiovascular: s1s2, tachycardic  Abdomen: soft, non tender, non distended, +BS  Extremities: warm, no edema or clubbing  Skin: intact  Neurological: opening eyes and following some simple commands    LINES:  none    HOSPITAL MEDICATIONS:  MEDICATIONS  (STANDING):  albuterol/ipratropium for Nebulization 3 milliLiter(s) Nebulizer every 6 hours  azithromycin  IVPB 500 milliGRAM(s) IV Intermittent every 24 hours  chlorhexidine 0.12% Liquid 15 milliLiter(s) Oral Mucosa every 12 hours  chlorhexidine 4% Liquid 1 Application(s) Topical <User Schedule>  dextrose 5%. 1000 milliLiter(s) (50 mL/Hr) IV Continuous <Continuous>  dextrose 50% Injectable 12.5 Gram(s) IV Push once  dextrose 50% Injectable 25 Gram(s) IV Push once  dextrose 50% Injectable 25 Gram(s) IV Push once  enoxaparin Injectable 80 milliGRAM(s) SubCutaneous every 12 hours  fluconAZOLE IVPB 100 milliGRAM(s) IV Intermittent <User Schedule>  insulin lispro (HumaLOG) corrective regimen sliding scale   SubCutaneous every 6 hours  midazolam Injectable 1 milliGRAM(s) IV Push every 8 hours  pantoprazole   Suspension 40 milliGRAM(s) Oral daily  phenylephrine    Infusion 0.4 MICROgram(s)/kG/Min (11.19 mL/Hr) IV Continuous <Continuous>  piperacillin/tazobactam IVPB.. 3.375 Gram(s) IV Intermittent every 8 hours  polyethylene glycol 3350 17 Gram(s) Oral daily  sodium chloride 3%  Inhalation 3 milliLiter(s) Inhalation every 6 hours    MEDICATIONS  (PRN):  acetaminophen    Suspension .. 650 milliGRAM(s) Oral every 6 hours PRN Temp greater or equal to 38C (100.4F), Mild Pain (1 - 3), Moderate Pain (4 - 6)  dextrose 40% Gel 15 Gram(s) Oral once PRN Blood Glucose LESS THAN 70 milliGRAM(s)/deciliter  glucagon  Injectable 1 milliGRAM(s) IntraMuscular once PRN Glucose LESS THAN 70 milligrams/deciliter      LABS:                        13.1   15.73 )-----------( 195      ( 28 Nov 2019 03:54 )             43.7     Hgb Trend: 13.1<--, 14.5<--, 16.3<--, 16.7<--  11-28    149<H>  |  115<H>  |  15  ----------------------------<  197<H>  3.1<L>   |  28  |  0.34<L>    Ca    8.7      28 Nov 2019 03:54  Phos  1.9     11-28  Mg     2.3     11-28    TPro  6.6  /  Alb  2.0<L>  /  TBili  0.5  /  DBili  x   /  AST  13<L>  /  ALT  11<L>  /  AlkPhos  108  11-28        Arterial Blood Gas:  11-27 @ 08:12  --/43/82/29/97/4.6  ABG lactate: --        MICROBIOLOGY:     RADIOLOGY:  [ ] Reviewed and interpreted by me

## 2019-11-29 LAB
ALBUMIN SERPL ELPH-MCNC: 1.8 G/DL — LOW (ref 3.3–5)
ALP SERPL-CCNC: 90 U/L — SIGNIFICANT CHANGE UP (ref 40–120)
ALT FLD-CCNC: 10 U/L — LOW (ref 12–78)
ANION GAP SERPL CALC-SCNC: 4 MMOL/L — LOW (ref 5–17)
AST SERPL-CCNC: 10 U/L — LOW (ref 15–37)
BASOPHILS # BLD AUTO: 0.01 K/UL — SIGNIFICANT CHANGE UP (ref 0–0.2)
BASOPHILS NFR BLD AUTO: 0.1 % — SIGNIFICANT CHANGE UP (ref 0–2)
BILIRUB SERPL-MCNC: 0.4 MG/DL — SIGNIFICANT CHANGE UP (ref 0.2–1.2)
BUN SERPL-MCNC: 18 MG/DL — SIGNIFICANT CHANGE UP (ref 7–23)
CALCIUM SERPL-MCNC: 8.4 MG/DL — LOW (ref 8.5–10.1)
CHLORIDE SERPL-SCNC: 114 MMOL/L — HIGH (ref 96–108)
CO2 SERPL-SCNC: 32 MMOL/L — HIGH (ref 22–31)
CREAT SERPL-MCNC: 0.38 MG/DL — LOW (ref 0.5–1.3)
EOSINOPHIL # BLD AUTO: 0.13 K/UL — SIGNIFICANT CHANGE UP (ref 0–0.5)
EOSINOPHIL NFR BLD AUTO: 1.7 % — SIGNIFICANT CHANGE UP (ref 0–6)
GLUCOSE BLDC GLUCOMTR-MCNC: 162 MG/DL — HIGH (ref 70–99)
GLUCOSE BLDC GLUCOMTR-MCNC: 188 MG/DL — HIGH (ref 70–99)
GLUCOSE BLDC GLUCOMTR-MCNC: 198 MG/DL — HIGH (ref 70–99)
GLUCOSE BLDC GLUCOMTR-MCNC: 230 MG/DL — HIGH (ref 70–99)
GLUCOSE SERPL-MCNC: 196 MG/DL — HIGH (ref 70–99)
HCT VFR BLD CALC: 37.9 % — SIGNIFICANT CHANGE UP (ref 34.5–45)
HGB BLD-MCNC: 11.4 G/DL — LOW (ref 11.5–15.5)
IMM GRANULOCYTES NFR BLD AUTO: 0.5 % — SIGNIFICANT CHANGE UP (ref 0–1.5)
LYMPHOCYTES # BLD AUTO: 0.95 K/UL — LOW (ref 1–3.3)
LYMPHOCYTES # BLD AUTO: 12.4 % — LOW (ref 13–44)
MAGNESIUM SERPL-MCNC: 2.4 MG/DL — SIGNIFICANT CHANGE UP (ref 1.6–2.6)
MCHC RBC-ENTMCNC: 28.3 PG — SIGNIFICANT CHANGE UP (ref 27–34)
MCHC RBC-ENTMCNC: 30.1 GM/DL — LOW (ref 32–36)
MCV RBC AUTO: 94 FL — SIGNIFICANT CHANGE UP (ref 80–100)
MONOCYTES # BLD AUTO: 0.5 K/UL — SIGNIFICANT CHANGE UP (ref 0–0.9)
MONOCYTES NFR BLD AUTO: 6.5 % — SIGNIFICANT CHANGE UP (ref 2–14)
NEUTROPHILS # BLD AUTO: 6.05 K/UL — SIGNIFICANT CHANGE UP (ref 1.8–7.4)
NEUTROPHILS NFR BLD AUTO: 78.8 % — HIGH (ref 43–77)
NRBC # BLD: 0 /100 WBCS — SIGNIFICANT CHANGE UP (ref 0–0)
PHOSPHATE SERPL-MCNC: 3.3 MG/DL — SIGNIFICANT CHANGE UP (ref 2.5–4.5)
PLATELET # BLD AUTO: 144 K/UL — LOW (ref 150–400)
POTASSIUM SERPL-MCNC: 4 MMOL/L — SIGNIFICANT CHANGE UP (ref 3.5–5.3)
POTASSIUM SERPL-SCNC: 4 MMOL/L — SIGNIFICANT CHANGE UP (ref 3.5–5.3)
PROCALCITONIN SERPL-MCNC: 0.16 NG/ML — HIGH (ref 0.02–0.1)
PROT SERPL-MCNC: 5.8 GM/DL — LOW (ref 6–8.3)
RBC # BLD: 4.03 M/UL — SIGNIFICANT CHANGE UP (ref 3.8–5.2)
RBC # FLD: 15.4 % — HIGH (ref 10.3–14.5)
SODIUM SERPL-SCNC: 150 MMOL/L — HIGH (ref 135–145)
WBC # BLD: 7.68 K/UL — SIGNIFICANT CHANGE UP (ref 3.8–10.5)
WBC # FLD AUTO: 7.68 K/UL — SIGNIFICANT CHANGE UP (ref 3.8–10.5)

## 2019-11-29 PROCEDURE — 93010 ELECTROCARDIOGRAM REPORT: CPT

## 2019-11-29 PROCEDURE — 99291 CRITICAL CARE FIRST HOUR: CPT

## 2019-11-29 RX ORDER — ACETAMINOPHEN 500 MG
650 TABLET ORAL EVERY 6 HOURS
Refills: 0 | Status: DISCONTINUED | OUTPATIENT
Start: 2019-11-29 | End: 2019-12-09

## 2019-11-29 RX ORDER — CEFAZOLIN SODIUM 1 G
2000 VIAL (EA) INJECTION EVERY 8 HOURS
Refills: 0 | Status: COMPLETED | OUTPATIENT
Start: 2019-11-29 | End: 2019-12-06

## 2019-11-29 RX ORDER — METOPROLOL TARTRATE 50 MG
2.5 TABLET ORAL ONCE
Refills: 0 | Status: COMPLETED | OUTPATIENT
Start: 2019-11-29 | End: 2019-11-29

## 2019-11-29 RX ORDER — CEFAZOLIN SODIUM 1 G
1000 VIAL (EA) INJECTION EVERY 8 HOURS
Refills: 0 | Status: DISCONTINUED | OUTPATIENT
Start: 2019-11-29 | End: 2019-11-29

## 2019-11-29 RX ORDER — FENTANYL CITRATE 50 UG/ML
50 INJECTION INTRAVENOUS EVERY 4 HOURS
Refills: 0 | Status: DISCONTINUED | OUTPATIENT
Start: 2019-11-29 | End: 2019-11-29

## 2019-11-29 RX ADMIN — Medication 3 MILLILITER(S): at 05:22

## 2019-11-29 RX ADMIN — Medication 2.5 MILLIGRAM(S): at 11:28

## 2019-11-29 RX ADMIN — PANTOPRAZOLE SODIUM 40 MILLIGRAM(S): 20 TABLET, DELAYED RELEASE ORAL at 11:23

## 2019-11-29 RX ADMIN — Medication 2: at 06:05

## 2019-11-29 RX ADMIN — CHLORHEXIDINE GLUCONATE 15 MILLILITER(S): 213 SOLUTION TOPICAL at 18:08

## 2019-11-29 RX ADMIN — Medication 100 MILLIGRAM(S): at 14:36

## 2019-11-29 RX ADMIN — POLYETHYLENE GLYCOL 3350 17 GRAM(S): 17 POWDER, FOR SOLUTION ORAL at 18:08

## 2019-11-29 RX ADMIN — CHLORHEXIDINE GLUCONATE 1 APPLICATION(S): 213 SOLUTION TOPICAL at 06:06

## 2019-11-29 RX ADMIN — Medication 2: at 23:20

## 2019-11-29 RX ADMIN — PIPERACILLIN AND TAZOBACTAM 25 GRAM(S): 4; .5 INJECTION, POWDER, LYOPHILIZED, FOR SOLUTION INTRAVENOUS at 06:05

## 2019-11-29 RX ADMIN — Medication 4: at 11:58

## 2019-11-29 RX ADMIN — CHLORHEXIDINE GLUCONATE 15 MILLILITER(S): 213 SOLUTION TOPICAL at 06:05

## 2019-11-29 RX ADMIN — MIDAZOLAM HYDROCHLORIDE 1 MILLIGRAM(S): 1 INJECTION, SOLUTION INTRAMUSCULAR; INTRAVENOUS at 23:08

## 2019-11-29 RX ADMIN — AZITHROMYCIN 255 MILLIGRAM(S): 500 TABLET, FILM COATED ORAL at 10:15

## 2019-11-29 RX ADMIN — Medication 3 MILLILITER(S): at 11:40

## 2019-11-29 RX ADMIN — PHENYLEPHRINE HYDROCHLORIDE 11.19 MICROGRAM(S)/KG/MIN: 10 INJECTION INTRAVENOUS at 23:19

## 2019-11-29 RX ADMIN — Medication 3 MILLILITER(S): at 18:03

## 2019-11-29 RX ADMIN — Medication 650 MILLIGRAM(S): at 06:52

## 2019-11-29 RX ADMIN — SODIUM CHLORIDE 3 MILLILITER(S): 9 INJECTION INTRAMUSCULAR; INTRAVENOUS; SUBCUTANEOUS at 05:22

## 2019-11-29 RX ADMIN — Medication 100 MILLIGRAM(S): at 23:00

## 2019-11-29 RX ADMIN — Medication 2: at 18:08

## 2019-11-29 RX ADMIN — MIDAZOLAM HYDROCHLORIDE 1 MILLIGRAM(S): 1 INJECTION, SOLUTION INTRAMUSCULAR; INTRAVENOUS at 06:05

## 2019-11-29 RX ADMIN — ENOXAPARIN SODIUM 80 MILLIGRAM(S): 100 INJECTION SUBCUTANEOUS at 18:08

## 2019-11-29 RX ADMIN — FENTANYL CITRATE 50 MICROGRAM(S): 50 INJECTION INTRAVENOUS at 10:41

## 2019-11-29 RX ADMIN — Medication 650 MILLIGRAM(S): at 07:45

## 2019-11-29 RX ADMIN — ENOXAPARIN SODIUM 80 MILLIGRAM(S): 100 INJECTION SUBCUTANEOUS at 06:52

## 2019-11-29 RX ADMIN — SODIUM CHLORIDE 3 MILLILITER(S): 9 INJECTION INTRAMUSCULAR; INTRAVENOUS; SUBCUTANEOUS at 18:03

## 2019-11-29 RX ADMIN — FENTANYL CITRATE 50 MICROGRAM(S): 50 INJECTION INTRAVENOUS at 10:27

## 2019-11-29 RX ADMIN — MIDAZOLAM HYDROCHLORIDE 1 MILLIGRAM(S): 1 INJECTION, SOLUTION INTRAMUSCULAR; INTRAVENOUS at 14:36

## 2019-11-29 RX ADMIN — SODIUM CHLORIDE 3 MILLILITER(S): 9 INJECTION INTRAMUSCULAR; INTRAVENOUS; SUBCUTANEOUS at 11:40

## 2019-11-29 NOTE — PROGRESS NOTE ADULT - ASSESSMENT
74F w/ DM, HTN, dementia/depression, ?COPD. Presents with increased lethargy, cough, and SOB. Admitted w/ acute hypoxemic respiratory failure in the setting of LLL pneumonia, UTI, septic shock.     #Neuro  - off all sedation - opens eyes to painful stimuli and starting to follow some commands  - continue w/ midazolam 1 mg q8 for chronic BDZ use  - doubt she is withdrawing from other meds- risperidone, paroxetine and seroquel  - fentanyl PRN for pain    #CV  - septic shock secondary to LLL pneumonia and UTI  - decreasing doses of phenylepherine  - start midodrine 10 mg tid today  - HR slightly more elevated today in the 120s - will monitor  - full dose AC with lovenox for Afib    #Pulm  - acute hypoxemic respiratory failure secondary to LLL pna   - secretions improving, continue w/ pulmonary toilet  - sputum cx MSSA  - will switch to cefazolin (11/25-), plan for 7 days total (zosyn 11/25-11/29)  - will continue azithromycin for anti-inflammatory for 5 days total  - PS weaning trial today    #ID  - septic shock secondary to pneumonia and UTI  - blood cx NGTD, urine cx <50K yeast, sputum growing few MSSA and yeast  - switch zosyn to cefazolin for MSSA in sputum - plan for 7 days total (zosyn 11/25-11/29; cefazolin 11/25-)  - d/c fluconazole today (11/25-11/29)  - procal improving; doubt fever is infectious in etiology     #Renal/Metabolic  - normal renal function  - good UOP  - free water for hypernatremia    #GI  - tolerating TF  - protonix  - miralax    #Endo  - FS mildly elevated - continue w/ FS and continue w/ ISS coverage    #Heme  - full dose lovenox for afib    #Dispo  - prognosis guarded  - remains critically ill on ventilator and in shock state in the ICU

## 2019-11-29 NOTE — PROGRESS NOTE ADULT - SUBJECTIVE AND OBJECTIVE BOX
CHIEF COMPLAINT:    Interval Events:  febrile to 101.5    REVIEW OF SYSTEMS:  [x ] Unable to assess ROS because intubated    OBJECTIVE:  ICU Vital Signs Last 24 Hrs  T(C): 37.8 (29 Nov 2019 15:00), Max: 38.6 (28 Nov 2019 20:00)  T(F): 100 (29 Nov 2019 15:00), Max: 101.5 (28 Nov 2019 20:00)  HR: 101 (29 Nov 2019 15:15) (75 - 137)  BP: 94/60 (29 Nov 2019 15:00) (51/22 - 123/65)  BP(mean): 68 (29 Nov 2019 15:00) (28 - 104)  ABP: --  ABP(mean): --  RR: 32 (29 Nov 2019 15:15) (15 - 37)  SpO2: 97% (29 Nov 2019 15:15) (89% - 99%)    Mode: CPAP with PS, FiO2: 35, PEEP: 5, PS: 5, MAP: 7, PIP: 11    11-28 @ 07:01 - 11-29 @ 07:00  --------------------------------------------------------  IN: 3821.6 mL / OUT: 1750 mL / NET: 2071.6 mL    11-29 @ 07:01 - 11-29 @ 15:24  --------------------------------------------------------  IN: 1041.3 mL / OUT: 330 mL / NET: 711.3 mL      CAPILLARY BLOOD GLUCOSE      POCT Blood Glucose.: 230 mg/dL (29 Nov 2019 11:46)      PHYSICAL EXAM:  General: NAD, non toxic appearing  HEENT: ETT in place  Neck: supple  Respiratory: CTA b/l, no wheezing  Cardiovascular: s1s2, tachycardic  Abdomen: soft, non tender, non distended, +BS  Extremities: warm, no edema or clubbing  Skin: intact  Neurological: opening eyes and following some simple commands    LINES:  none      HOSPITAL MEDICATIONS:  MEDICATIONS  (STANDING):  albuterol/ipratropium for Nebulization 3 milliLiter(s) Nebulizer every 6 hours  azithromycin  IVPB 500 milliGRAM(s) IV Intermittent every 24 hours  ceFAZolin   IVPB 2000 milliGRAM(s) IV Intermittent every 8 hours  chlorhexidine 0.12% Liquid 15 milliLiter(s) Oral Mucosa every 12 hours  chlorhexidine 4% Liquid 1 Application(s) Topical <User Schedule>  dextrose 5%. 1000 milliLiter(s) (50 mL/Hr) IV Continuous <Continuous>  dextrose 50% Injectable 12.5 Gram(s) IV Push once  dextrose 50% Injectable 25 Gram(s) IV Push once  dextrose 50% Injectable 25 Gram(s) IV Push once  enoxaparin Injectable 80 milliGRAM(s) SubCutaneous every 12 hours  insulin lispro (HumaLOG) corrective regimen sliding scale   SubCutaneous every 6 hours  midazolam Injectable 1 milliGRAM(s) IV Push every 8 hours  pantoprazole   Suspension 40 milliGRAM(s) Oral daily  phenylephrine    Infusion 0.4 MICROgram(s)/kG/Min (11.19 mL/Hr) IV Continuous <Continuous>  polyethylene glycol 3350 17 Gram(s) Oral daily  sodium chloride 3%  Inhalation 3 milliLiter(s) Inhalation every 6 hours    MEDICATIONS  (PRN):  acetaminophen    Suspension .. 650 milliGRAM(s) Oral every 6 hours PRN Temp greater or equal to 38C (100.4F), Mild Pain (1 - 3), Moderate Pain (4 - 6)  dextrose 40% Gel 15 Gram(s) Oral once PRN Blood Glucose LESS THAN 70 milliGRAM(s)/deciliter  fentaNYL    Injectable 50 MICROGram(s) IV Push every 4 hours PRN Moderate Pain (4 - 6)  glucagon  Injectable 1 milliGRAM(s) IntraMuscular once PRN Glucose LESS THAN 70 milligrams/deciliter      LABS:                        11.4   7.68  )-----------( 144      ( 29 Nov 2019 03:53 )             37.9     Hgb Trend: 11.4<--, 13.1<--, 14.5<--, 16.3<--, 16.7<--  11-29    150<H>  |  114<H>  |  18  ----------------------------<  196<H>  4.0   |  32<H>  |  0.38<L>    Ca    8.4<L>      29 Nov 2019 03:53  Phos  3.3     11-29  Mg     2.4     11-29    TPro  5.8<L>  /  Alb  1.8<L>  /  TBili  0.4  /  DBili  x   /  AST  10<L>  /  ALT  10<L>  /  AlkPhos  90  11-29              MICROBIOLOGY:     RADIOLOGY:  [ ] Reviewed and interpreted by me

## 2019-11-30 LAB
ALBUMIN SERPL ELPH-MCNC: 1.8 G/DL — LOW (ref 3.3–5)
ALP SERPL-CCNC: 80 U/L — SIGNIFICANT CHANGE UP (ref 40–120)
ALT FLD-CCNC: 8 U/L — LOW (ref 12–78)
ANION GAP SERPL CALC-SCNC: 4 MMOL/L — LOW (ref 5–17)
AST SERPL-CCNC: 10 U/L — LOW (ref 15–37)
BASOPHILS # BLD AUTO: 0.02 K/UL — SIGNIFICANT CHANGE UP (ref 0–0.2)
BASOPHILS NFR BLD AUTO: 0.3 % — SIGNIFICANT CHANGE UP (ref 0–2)
BILIRUB SERPL-MCNC: 0.3 MG/DL — SIGNIFICANT CHANGE UP (ref 0.2–1.2)
BUN SERPL-MCNC: 15 MG/DL — SIGNIFICANT CHANGE UP (ref 7–23)
CALCIUM SERPL-MCNC: 8.4 MG/DL — LOW (ref 8.5–10.1)
CHLORIDE SERPL-SCNC: 107 MMOL/L — SIGNIFICANT CHANGE UP (ref 96–108)
CO2 SERPL-SCNC: 33 MMOL/L — HIGH (ref 22–31)
CREAT SERPL-MCNC: 0.32 MG/DL — LOW (ref 0.5–1.3)
CULTURE RESULTS: SIGNIFICANT CHANGE UP
CULTURE RESULTS: SIGNIFICANT CHANGE UP
EOSINOPHIL # BLD AUTO: 0.31 K/UL — SIGNIFICANT CHANGE UP (ref 0–0.5)
EOSINOPHIL NFR BLD AUTO: 4 % — SIGNIFICANT CHANGE UP (ref 0–6)
GLUCOSE BLDC GLUCOMTR-MCNC: 170 MG/DL — HIGH (ref 70–99)
GLUCOSE BLDC GLUCOMTR-MCNC: 175 MG/DL — HIGH (ref 70–99)
GLUCOSE SERPL-MCNC: 179 MG/DL — HIGH (ref 70–99)
HCT VFR BLD CALC: 37.2 % — SIGNIFICANT CHANGE UP (ref 34.5–45)
HGB BLD-MCNC: 11.2 G/DL — LOW (ref 11.5–15.5)
IMM GRANULOCYTES NFR BLD AUTO: 0.8 % — SIGNIFICANT CHANGE UP (ref 0–1.5)
LYMPHOCYTES # BLD AUTO: 1.14 K/UL — SIGNIFICANT CHANGE UP (ref 1–3.3)
LYMPHOCYTES # BLD AUTO: 14.8 % — SIGNIFICANT CHANGE UP (ref 13–44)
MAGNESIUM SERPL-MCNC: 2.3 MG/DL — SIGNIFICANT CHANGE UP (ref 1.6–2.6)
MCHC RBC-ENTMCNC: 28.5 PG — SIGNIFICANT CHANGE UP (ref 27–34)
MCHC RBC-ENTMCNC: 30.1 GM/DL — LOW (ref 32–36)
MCV RBC AUTO: 94.7 FL — SIGNIFICANT CHANGE UP (ref 80–100)
MONOCYTES # BLD AUTO: 0.54 K/UL — SIGNIFICANT CHANGE UP (ref 0–0.9)
MONOCYTES NFR BLD AUTO: 7 % — SIGNIFICANT CHANGE UP (ref 2–14)
NEUTROPHILS # BLD AUTO: 5.63 K/UL — SIGNIFICANT CHANGE UP (ref 1.8–7.4)
NEUTROPHILS NFR BLD AUTO: 73.1 % — SIGNIFICANT CHANGE UP (ref 43–77)
NRBC # BLD: 0 /100 WBCS — SIGNIFICANT CHANGE UP (ref 0–0)
PHOSPHATE SERPL-MCNC: 2.5 MG/DL — SIGNIFICANT CHANGE UP (ref 2.5–4.5)
PLATELET # BLD AUTO: 150 K/UL — SIGNIFICANT CHANGE UP (ref 150–400)
POTASSIUM SERPL-MCNC: 3.9 MMOL/L — SIGNIFICANT CHANGE UP (ref 3.5–5.3)
POTASSIUM SERPL-SCNC: 3.9 MMOL/L — SIGNIFICANT CHANGE UP (ref 3.5–5.3)
PROT SERPL-MCNC: 5.6 GM/DL — LOW (ref 6–8.3)
RBC # BLD: 3.93 M/UL — SIGNIFICANT CHANGE UP (ref 3.8–5.2)
RBC # FLD: 14.9 % — HIGH (ref 10.3–14.5)
SODIUM SERPL-SCNC: 144 MMOL/L — SIGNIFICANT CHANGE UP (ref 135–145)
SPECIMEN SOURCE: SIGNIFICANT CHANGE UP
SPECIMEN SOURCE: SIGNIFICANT CHANGE UP
WBC # BLD: 7.7 K/UL — SIGNIFICANT CHANGE UP (ref 3.8–10.5)
WBC # FLD AUTO: 7.7 K/UL — SIGNIFICANT CHANGE UP (ref 3.8–10.5)

## 2019-11-30 RX ORDER — MIDODRINE HYDROCHLORIDE 2.5 MG/1
10 TABLET ORAL EVERY 8 HOURS
Refills: 0 | Status: DISCONTINUED | OUTPATIENT
Start: 2019-11-30 | End: 2019-12-09

## 2019-11-30 RX ORDER — ERYTHROMYCIN BASE 5 MG/GRAM
1 OINTMENT (GRAM) OPHTHALMIC (EYE)
Refills: 0 | Status: COMPLETED | OUTPATIENT
Start: 2019-11-30 | End: 2019-12-04

## 2019-11-30 RX ADMIN — Medication 100 MILLIGRAM(S): at 13:48

## 2019-11-30 RX ADMIN — SODIUM CHLORIDE 3 MILLILITER(S): 9 INJECTION INTRAMUSCULAR; INTRAVENOUS; SUBCUTANEOUS at 23:50

## 2019-11-30 RX ADMIN — MIDAZOLAM HYDROCHLORIDE 1 MILLIGRAM(S): 1 INJECTION, SOLUTION INTRAMUSCULAR; INTRAVENOUS at 21:30

## 2019-11-30 RX ADMIN — SODIUM CHLORIDE 3 MILLILITER(S): 9 INJECTION INTRAMUSCULAR; INTRAVENOUS; SUBCUTANEOUS at 18:12

## 2019-11-30 RX ADMIN — Medication 3 MILLILITER(S): at 00:40

## 2019-11-30 RX ADMIN — AZITHROMYCIN 255 MILLIGRAM(S): 500 TABLET, FILM COATED ORAL at 09:13

## 2019-11-30 RX ADMIN — MIDODRINE HYDROCHLORIDE 10 MILLIGRAM(S): 2.5 TABLET ORAL at 21:30

## 2019-11-30 RX ADMIN — ENOXAPARIN SODIUM 80 MILLIGRAM(S): 100 INJECTION SUBCUTANEOUS at 06:50

## 2019-11-30 RX ADMIN — Medication 100 MILLIGRAM(S): at 21:31

## 2019-11-30 RX ADMIN — Medication 1 APPLICATION(S): at 22:08

## 2019-11-30 RX ADMIN — Medication 3 MILLILITER(S): at 11:25

## 2019-11-30 RX ADMIN — SODIUM CHLORIDE 3 MILLILITER(S): 9 INJECTION INTRAMUSCULAR; INTRAVENOUS; SUBCUTANEOUS at 11:25

## 2019-11-30 RX ADMIN — Medication 3 MILLILITER(S): at 18:11

## 2019-11-30 RX ADMIN — CHLORHEXIDINE GLUCONATE 15 MILLILITER(S): 213 SOLUTION TOPICAL at 17:35

## 2019-11-30 RX ADMIN — MIDODRINE HYDROCHLORIDE 10 MILLIGRAM(S): 2.5 TABLET ORAL at 13:48

## 2019-11-30 RX ADMIN — Medication 100 MILLIGRAM(S): at 06:29

## 2019-11-30 RX ADMIN — ENOXAPARIN SODIUM 80 MILLIGRAM(S): 100 INJECTION SUBCUTANEOUS at 17:35

## 2019-11-30 RX ADMIN — PANTOPRAZOLE SODIUM 40 MILLIGRAM(S): 20 TABLET, DELAYED RELEASE ORAL at 12:07

## 2019-11-30 RX ADMIN — Medication 2: at 23:30

## 2019-11-30 RX ADMIN — MIDAZOLAM HYDROCHLORIDE 1 MILLIGRAM(S): 1 INJECTION, SOLUTION INTRAMUSCULAR; INTRAVENOUS at 13:48

## 2019-11-30 RX ADMIN — Medication 2: at 12:08

## 2019-11-30 RX ADMIN — MIDAZOLAM HYDROCHLORIDE 1 MILLIGRAM(S): 1 INJECTION, SOLUTION INTRAMUSCULAR; INTRAVENOUS at 06:30

## 2019-11-30 RX ADMIN — SODIUM CHLORIDE 3 MILLILITER(S): 9 INJECTION INTRAMUSCULAR; INTRAVENOUS; SUBCUTANEOUS at 05:29

## 2019-11-30 RX ADMIN — Medication 3 MILLILITER(S): at 05:28

## 2019-11-30 RX ADMIN — CHLORHEXIDINE GLUCONATE 1 APPLICATION(S): 213 SOLUTION TOPICAL at 04:00

## 2019-11-30 RX ADMIN — Medication 3 MILLILITER(S): at 23:50

## 2019-11-30 RX ADMIN — SODIUM CHLORIDE 3 MILLILITER(S): 9 INJECTION INTRAMUSCULAR; INTRAVENOUS; SUBCUTANEOUS at 01:04

## 2019-11-30 RX ADMIN — CHLORHEXIDINE GLUCONATE 15 MILLILITER(S): 213 SOLUTION TOPICAL at 06:29

## 2019-11-30 RX ADMIN — Medication 2: at 06:50

## 2019-11-30 RX ADMIN — Medication 2: at 18:18

## 2019-12-01 LAB
ANION GAP SERPL CALC-SCNC: 7 MMOL/L — SIGNIFICANT CHANGE UP (ref 5–17)
BUN SERPL-MCNC: 10 MG/DL — SIGNIFICANT CHANGE UP (ref 7–23)
CALCIUM SERPL-MCNC: 8.9 MG/DL — SIGNIFICANT CHANGE UP (ref 8.5–10.1)
CHLORIDE SERPL-SCNC: 105 MMOL/L — SIGNIFICANT CHANGE UP (ref 96–108)
CO2 SERPL-SCNC: 33 MMOL/L — HIGH (ref 22–31)
CREAT SERPL-MCNC: 0.35 MG/DL — LOW (ref 0.5–1.3)
GLUCOSE BLDC GLUCOMTR-MCNC: 174 MG/DL — HIGH (ref 70–99)
GLUCOSE BLDC GLUCOMTR-MCNC: 178 MG/DL — HIGH (ref 70–99)
GLUCOSE BLDC GLUCOMTR-MCNC: 178 MG/DL — HIGH (ref 70–99)
GLUCOSE BLDC GLUCOMTR-MCNC: 198 MG/DL — HIGH (ref 70–99)
GLUCOSE SERPL-MCNC: 181 MG/DL — HIGH (ref 70–99)
HCT VFR BLD CALC: 39.1 % — SIGNIFICANT CHANGE UP (ref 34.5–45)
HGB BLD-MCNC: 12 G/DL — SIGNIFICANT CHANGE UP (ref 11.5–15.5)
MAGNESIUM SERPL-MCNC: 2.2 MG/DL — SIGNIFICANT CHANGE UP (ref 1.6–2.6)
MCHC RBC-ENTMCNC: 27.9 PG — SIGNIFICANT CHANGE UP (ref 27–34)
MCHC RBC-ENTMCNC: 30.7 GM/DL — LOW (ref 32–36)
MCV RBC AUTO: 90.9 FL — SIGNIFICANT CHANGE UP (ref 80–100)
NRBC # BLD: 0 /100 WBCS — SIGNIFICANT CHANGE UP (ref 0–0)
PHOSPHATE SERPL-MCNC: 2.9 MG/DL — SIGNIFICANT CHANGE UP (ref 2.5–4.5)
PLATELET # BLD AUTO: 166 K/UL — SIGNIFICANT CHANGE UP (ref 150–400)
POTASSIUM SERPL-MCNC: 3.5 MMOL/L — SIGNIFICANT CHANGE UP (ref 3.5–5.3)
POTASSIUM SERPL-SCNC: 3.5 MMOL/L — SIGNIFICANT CHANGE UP (ref 3.5–5.3)
RBC # BLD: 4.3 M/UL — SIGNIFICANT CHANGE UP (ref 3.8–5.2)
RBC # FLD: 14.6 % — HIGH (ref 10.3–14.5)
SODIUM SERPL-SCNC: 145 MMOL/L — SIGNIFICANT CHANGE UP (ref 135–145)
WBC # BLD: 7.33 K/UL — SIGNIFICANT CHANGE UP (ref 3.8–10.5)
WBC # FLD AUTO: 7.33 K/UL — SIGNIFICANT CHANGE UP (ref 3.8–10.5)

## 2019-12-01 RX ORDER — POTASSIUM CHLORIDE 20 MEQ
10 PACKET (EA) ORAL
Refills: 0 | Status: COMPLETED | OUTPATIENT
Start: 2019-12-01 | End: 2019-12-01

## 2019-12-01 RX ADMIN — CHLORHEXIDINE GLUCONATE 1 APPLICATION(S): 213 SOLUTION TOPICAL at 05:55

## 2019-12-01 RX ADMIN — Medication 100 MILLIEQUIVALENT(S): at 07:15

## 2019-12-01 RX ADMIN — Medication 100 MILLIGRAM(S): at 21:24

## 2019-12-01 RX ADMIN — Medication 1 APPLICATION(S): at 18:36

## 2019-12-01 RX ADMIN — Medication 2: at 23:24

## 2019-12-01 RX ADMIN — Medication 100 MILLIEQUIVALENT(S): at 08:04

## 2019-12-01 RX ADMIN — ENOXAPARIN SODIUM 80 MILLIGRAM(S): 100 INJECTION SUBCUTANEOUS at 05:54

## 2019-12-01 RX ADMIN — MIDODRINE HYDROCHLORIDE 10 MILLIGRAM(S): 2.5 TABLET ORAL at 14:34

## 2019-12-01 RX ADMIN — SODIUM CHLORIDE 3 MILLILITER(S): 9 INJECTION INTRAMUSCULAR; INTRAVENOUS; SUBCUTANEOUS at 11:14

## 2019-12-01 RX ADMIN — Medication 100 MILLIGRAM(S): at 05:53

## 2019-12-01 RX ADMIN — ENOXAPARIN SODIUM 80 MILLIGRAM(S): 100 INJECTION SUBCUTANEOUS at 18:35

## 2019-12-01 RX ADMIN — CHLORHEXIDINE GLUCONATE 15 MILLILITER(S): 213 SOLUTION TOPICAL at 18:35

## 2019-12-01 RX ADMIN — POLYETHYLENE GLYCOL 3350 17 GRAM(S): 17 POWDER, FOR SOLUTION ORAL at 18:35

## 2019-12-01 RX ADMIN — Medication 2: at 18:35

## 2019-12-01 RX ADMIN — MIDODRINE HYDROCHLORIDE 10 MILLIGRAM(S): 2.5 TABLET ORAL at 05:54

## 2019-12-01 RX ADMIN — CHLORHEXIDINE GLUCONATE 15 MILLILITER(S): 213 SOLUTION TOPICAL at 05:54

## 2019-12-01 RX ADMIN — Medication 2: at 06:00

## 2019-12-01 RX ADMIN — MIDAZOLAM HYDROCHLORIDE 1 MILLIGRAM(S): 1 INJECTION, SOLUTION INTRAMUSCULAR; INTRAVENOUS at 05:55

## 2019-12-01 RX ADMIN — Medication 3 MILLILITER(S): at 17:10

## 2019-12-01 RX ADMIN — MIDAZOLAM HYDROCHLORIDE 1 MILLIGRAM(S): 1 INJECTION, SOLUTION INTRAMUSCULAR; INTRAVENOUS at 14:32

## 2019-12-01 RX ADMIN — Medication 1 APPLICATION(S): at 05:55

## 2019-12-01 RX ADMIN — PANTOPRAZOLE SODIUM 40 MILLIGRAM(S): 20 TABLET, DELAYED RELEASE ORAL at 12:27

## 2019-12-01 RX ADMIN — MIDODRINE HYDROCHLORIDE 10 MILLIGRAM(S): 2.5 TABLET ORAL at 21:24

## 2019-12-01 RX ADMIN — Medication 100 MILLIEQUIVALENT(S): at 05:53

## 2019-12-01 RX ADMIN — AZITHROMYCIN 255 MILLIGRAM(S): 500 TABLET, FILM COATED ORAL at 09:32

## 2019-12-01 RX ADMIN — Medication 100 MILLIGRAM(S): at 14:33

## 2019-12-01 RX ADMIN — Medication 3 MILLILITER(S): at 05:26

## 2019-12-01 RX ADMIN — Medication 3 MILLILITER(S): at 11:14

## 2019-12-01 RX ADMIN — MIDAZOLAM HYDROCHLORIDE 1 MILLIGRAM(S): 1 INJECTION, SOLUTION INTRAMUSCULAR; INTRAVENOUS at 22:20

## 2019-12-01 RX ADMIN — SODIUM CHLORIDE 3 MILLILITER(S): 9 INJECTION INTRAMUSCULAR; INTRAVENOUS; SUBCUTANEOUS at 05:27

## 2019-12-01 RX ADMIN — Medication 2: at 12:26

## 2019-12-01 RX ADMIN — SODIUM CHLORIDE 3 MILLILITER(S): 9 INJECTION INTRAMUSCULAR; INTRAVENOUS; SUBCUTANEOUS at 17:10

## 2019-12-01 NOTE — PROGRESS NOTE ADULT - SUBJECTIVE AND OBJECTIVE BOX
HPI:  75 yo F bibems for acute resp failure.  Pt. has been reportedly sob and altered for 3 days.  Patient can not give further history due to condition.  Noted to be hypoxic  and blue colored.  No other complaints or inciting event.  No family at bedside now. Disoriented for last few days 4 days, sleeping, coughing with mucous and congestion, can't swallow pills.  Pt. has 24 hour HHA, lives with son. As per son increased secretions last few day, Increased lethargy.  Multiple visits in last month for UTI.  In ER noted to be hypoxic requiring intubation. Received 3 liter IV fluid Lacate 2.3.  ICU called for evaluation and management (2019 16:53)      24 hr events:      ## ROS:  [ ] unable to obtain  CONSTITUTIONAL: No fever, weight loss, or fatigue  EYES: No eye pain, visual disturbances, or discharge  ENMT:  No difficulty hearing, tinnitus, vertigo; No sinus or throat pain  NECK: No pain or stiffness  RESPIRATORY: No cough, wheezing, chills or hemoptysis; No shortness of breath  CARDIOVASCULAR: No chest pain, palpitations, dizziness, or leg swelling  GASTROINTESTINAL: No abdominal or epigastric pain. No nausea, vomiting, or hematemesis; No diarrhea or constipation. No melena or hematochezia.  GENITOURINARY: No dysuria, frequency, hematuria, or incontinence  NEUROLOGICAL: No headaches, memory loss, loss of strength, numbness, or tremors  SKIN: No itching, burning, rashes, or lesions   LYMPH NODES: No enlarged glands  ENDOCRINE: No heat or cold intolerance; No hair loss  MUSCULOSKELETAL: No joint pain or swelling; No muscle, back, or extremity pain  PSYCHIATRIC: No depression, anxiety, mood swings, or difficulty sleeping  HEME/LYMPH: No easy bruising, or bleeding gums  ALLERGY AND IMMUNOLOGIC: No hives or eczema    ## Labs:  CBC:                        12.0   7.33  )-----------( 166      ( 01 Dec 2019 04:03 )             39.1     Chem:      145  |  105  |  10  ----------------------------<  181<H>  3.5   |  33<H>  |  0.35<L>    Ca    8.9      01 Dec 2019 04:03  Phos  2.9       Mg     2.2         TPro  5.6<L>  /  Alb  1.8<L>  /  TBili  0.3  /  DBili  x   /  AST  10<L>  /  ALT  8<L>  /  AlkPhos  80      Coags:          ## Imaging:    ## Medications:  ceFAZolin   IVPB 2000 milliGRAM(s) IV Intermittent every 8 hours    midodrine 10 milliGRAM(s) Oral every 8 hours  phenylephrine    Infusion 0.4 MICROgram(s)/kG/Min IV Continuous <Continuous>    albuterol/ipratropium for Nebulization 3 milliLiter(s) Nebulizer every 6 hours  sodium chloride 3%  Inhalation 3 milliLiter(s) Inhalation every 6 hours    dextrose 40% Gel 15 Gram(s) Oral once PRN  dextrose 50% Injectable 12.5 Gram(s) IV Push once  dextrose 50% Injectable 25 Gram(s) IV Push once  dextrose 50% Injectable 25 Gram(s) IV Push once  glucagon  Injectable 1 milliGRAM(s) IntraMuscular once PRN  insulin lispro (HumaLOG) corrective regimen sliding scale   SubCutaneous every 6 hours    enoxaparin Injectable 80 milliGRAM(s) SubCutaneous every 12 hours    pantoprazole   Suspension 40 milliGRAM(s) Oral daily  polyethylene glycol 3350 17 Gram(s) Oral daily    acetaminophen   Tablet .. 650 milliGRAM(s) Oral every 6 hours PRN  fentaNYL    Injectable 50 MICROGram(s) IV Push every 4 hours PRN  midazolam Injectable 1 milliGRAM(s) IV Push every 8 hours      ## Vitals:  T(C): 37.2 (19 @ 18:55), Max: 37.9 (19 @ 07:00)  HR: 101 (19 @ 17:30) (86 - 139)  BP: 107/52 (19 @ 17:30) (80/53 - 137/59)  BP(mean): 65 (19 @ 17:30) (58 - 85)  RR: 24 (19 @ 17:30) (18 - 39)  SpO2: 97% (19 @ 17:30) (84% - 100%)  Wt(kg): --  Vent: Mode: AC/ CMV (Assist Control/ Continuous Mandatory Ventilation), RR (machine): 18, RR (patient): 21, TV (machine): 400, FiO2: 35, PEEP: 5, PIP: 19  AB-30 @ : @ 07:00  --------------------------------------------------------  IN: 3101.5 mL / OUT: 2400 mL / NET: 701.5 mL     @ : @ 19:29  --------------------------------------------------------  IN: 1200 mL / OUT: 0 mL / NET: 1200 mL          ## P/E:  Gen: lying comfortably in bed in no apparent distress  HEENT: PERRL, EOMI  Resp: CTA B/L no c/r/w  CVS: S1S2 no m/r/g  Abd: soft NT/ND +BS  Ext: no c/c/e  Neuro: A&Ox3    CENTRAL LINE: [ ] YES [ ] NO  LOCATION:   DATE INSERTED:  REMOVE: [ ] YES [ ] NO      BAUDILIO: [ ] YES [ ] NO    DATE INSERTED:  REMOVE:  [ ] YES [ ] NO      A-LINE:  [ ] YES [ ] NO  LOCATION:   DATE INSERTED:  REMOVE:  [ ] YES [ ] NO  EXPLAIN:    GLOBAL ISSUE/BEST PRACTICE:  Analgesia:  Sedation:  HOB elevation: yes  Stress ulcer prophylaxis:  VTE prophylaxis:  Oral Care:  Glycemic control:  Nutrition:    CODE STATUS: [ ] full code  [ ] DNR  [ ] DNI  [ ] [facility]  Goals of care discussion: [ ] yes

## 2019-12-02 DIAGNOSIS — E11.42 TYPE 2 DIABETES MELLITUS WITH DIABETIC POLYNEUROPATHY: ICD-10-CM

## 2019-12-02 DIAGNOSIS — F05 DELIRIUM DUE TO KNOWN PHYSIOLOGICAL CONDITION: ICD-10-CM

## 2019-12-02 DIAGNOSIS — L89.521 PRESSURE ULCER OF LEFT ANKLE, STAGE 1: ICD-10-CM

## 2019-12-02 DIAGNOSIS — F03.91 UNSPECIFIED DEMENTIA WITH BEHAVIORAL DISTURBANCE: ICD-10-CM

## 2019-12-02 DIAGNOSIS — E11.69 TYPE 2 DIABETES MELLITUS WITH OTHER SPECIFIED COMPLICATION: ICD-10-CM

## 2019-12-02 LAB
ANION GAP SERPL CALC-SCNC: 5 MMOL/L — SIGNIFICANT CHANGE UP (ref 5–17)
BASE EXCESS BLDA CALC-SCNC: 8.2 MMOL/L — HIGH (ref -2–2)
BLOOD GAS COMMENTS: SIGNIFICANT CHANGE UP
BLOOD GAS COMMENTS: SIGNIFICANT CHANGE UP
BLOOD GAS SOURCE: SIGNIFICANT CHANGE UP
BUN SERPL-MCNC: 9 MG/DL — SIGNIFICANT CHANGE UP (ref 7–23)
CALCIUM SERPL-MCNC: 8.8 MG/DL — SIGNIFICANT CHANGE UP (ref 8.5–10.1)
CHLORIDE SERPL-SCNC: 104 MMOL/L — SIGNIFICANT CHANGE UP (ref 96–108)
CO2 SERPL-SCNC: 30 MMOL/L — SIGNIFICANT CHANGE UP (ref 22–31)
CREAT SERPL-MCNC: 0.34 MG/DL — LOW (ref 0.5–1.3)
GLUCOSE BLDC GLUCOMTR-MCNC: 163 MG/DL — HIGH (ref 70–99)
GLUCOSE BLDC GLUCOMTR-MCNC: 164 MG/DL — HIGH (ref 70–99)
GLUCOSE BLDC GLUCOMTR-MCNC: 178 MG/DL — HIGH (ref 70–99)
GLUCOSE SERPL-MCNC: 176 MG/DL — HIGH (ref 70–99)
HCO3 BLDA-SCNC: 32 MMOL/L — HIGH (ref 21–29)
HCT VFR BLD CALC: 36.1 % — SIGNIFICANT CHANGE UP (ref 34.5–45)
HGB BLD-MCNC: 11.1 G/DL — LOW (ref 11.5–15.5)
HOROWITZ INDEX BLDA+IHG-RTO: 35 — SIGNIFICANT CHANGE UP
MAGNESIUM SERPL-MCNC: 2.3 MG/DL — SIGNIFICANT CHANGE UP (ref 1.6–2.6)
MCHC RBC-ENTMCNC: 28.5 PG — SIGNIFICANT CHANGE UP (ref 27–34)
MCHC RBC-ENTMCNC: 30.7 GM/DL — LOW (ref 32–36)
MCV RBC AUTO: 92.6 FL — SIGNIFICANT CHANGE UP (ref 80–100)
NRBC # BLD: 0 /100 WBCS — SIGNIFICANT CHANGE UP (ref 0–0)
PCO2 BLDA: 42 MMHG — SIGNIFICANT CHANGE UP (ref 32–46)
PH BLD: 7.49 — HIGH (ref 7.35–7.45)
PHOSPHATE SERPL-MCNC: 3.1 MG/DL — SIGNIFICANT CHANGE UP (ref 2.5–4.5)
PLATELET # BLD AUTO: 174 K/UL — SIGNIFICANT CHANGE UP (ref 150–400)
PO2 BLDA: 79 MMHG — SIGNIFICANT CHANGE UP (ref 74–108)
POTASSIUM SERPL-MCNC: 3.8 MMOL/L — SIGNIFICANT CHANGE UP (ref 3.5–5.3)
POTASSIUM SERPL-SCNC: 3.8 MMOL/L — SIGNIFICANT CHANGE UP (ref 3.5–5.3)
RBC # BLD: 3.9 M/UL — SIGNIFICANT CHANGE UP (ref 3.8–5.2)
RBC # FLD: 14.6 % — HIGH (ref 10.3–14.5)
SAO2 % BLDA: 96 % — SIGNIFICANT CHANGE UP (ref 92–96)
SODIUM SERPL-SCNC: 139 MMOL/L — SIGNIFICANT CHANGE UP (ref 135–145)
WBC # BLD: 6.12 K/UL — SIGNIFICANT CHANGE UP (ref 3.8–10.5)
WBC # FLD AUTO: 6.12 K/UL — SIGNIFICANT CHANGE UP (ref 3.8–10.5)

## 2019-12-02 PROCEDURE — 90792 PSYCH DIAG EVAL W/MED SRVCS: CPT

## 2019-12-02 RX ADMIN — MIDODRINE HYDROCHLORIDE 10 MILLIGRAM(S): 2.5 TABLET ORAL at 13:59

## 2019-12-02 RX ADMIN — SODIUM CHLORIDE 3 MILLILITER(S): 9 INJECTION INTRAMUSCULAR; INTRAVENOUS; SUBCUTANEOUS at 05:18

## 2019-12-02 RX ADMIN — Medication 1 APPLICATION(S): at 17:49

## 2019-12-02 RX ADMIN — CHLORHEXIDINE GLUCONATE 1 APPLICATION(S): 213 SOLUTION TOPICAL at 05:10

## 2019-12-02 RX ADMIN — SODIUM CHLORIDE 3 MILLILITER(S): 9 INJECTION INTRAMUSCULAR; INTRAVENOUS; SUBCUTANEOUS at 00:26

## 2019-12-02 RX ADMIN — Medication 1 APPLICATION(S): at 05:09

## 2019-12-02 RX ADMIN — Medication 2: at 11:51

## 2019-12-02 RX ADMIN — SODIUM CHLORIDE 3 MILLILITER(S): 9 INJECTION INTRAMUSCULAR; INTRAVENOUS; SUBCUTANEOUS at 17:41

## 2019-12-02 RX ADMIN — SODIUM CHLORIDE 3 MILLILITER(S): 9 INJECTION INTRAMUSCULAR; INTRAVENOUS; SUBCUTANEOUS at 11:29

## 2019-12-02 RX ADMIN — Medication 3 MILLILITER(S): at 00:26

## 2019-12-02 RX ADMIN — Medication 3 MILLILITER(S): at 05:18

## 2019-12-02 RX ADMIN — Medication 2: at 05:09

## 2019-12-02 RX ADMIN — MIDAZOLAM HYDROCHLORIDE 1 MILLIGRAM(S): 1 INJECTION, SOLUTION INTRAMUSCULAR; INTRAVENOUS at 13:59

## 2019-12-02 RX ADMIN — MIDODRINE HYDROCHLORIDE 10 MILLIGRAM(S): 2.5 TABLET ORAL at 05:09

## 2019-12-02 RX ADMIN — Medication 3 MILLILITER(S): at 17:40

## 2019-12-02 RX ADMIN — Medication 100 MILLIGRAM(S): at 05:10

## 2019-12-02 RX ADMIN — Medication 100 MILLIGRAM(S): at 21:54

## 2019-12-02 RX ADMIN — ENOXAPARIN SODIUM 80 MILLIGRAM(S): 100 INJECTION SUBCUTANEOUS at 05:09

## 2019-12-02 RX ADMIN — Medication 3 MILLILITER(S): at 11:29

## 2019-12-02 RX ADMIN — MIDAZOLAM HYDROCHLORIDE 1 MILLIGRAM(S): 1 INJECTION, SOLUTION INTRAMUSCULAR; INTRAVENOUS at 21:55

## 2019-12-02 RX ADMIN — MIDAZOLAM HYDROCHLORIDE 1 MILLIGRAM(S): 1 INJECTION, SOLUTION INTRAMUSCULAR; INTRAVENOUS at 05:10

## 2019-12-02 RX ADMIN — ENOXAPARIN SODIUM 80 MILLIGRAM(S): 100 INJECTION SUBCUTANEOUS at 18:05

## 2019-12-02 RX ADMIN — CHLORHEXIDINE GLUCONATE 15 MILLILITER(S): 213 SOLUTION TOPICAL at 05:09

## 2019-12-02 RX ADMIN — Medication 100 MILLIGRAM(S): at 14:00

## 2019-12-02 RX ADMIN — PANTOPRAZOLE SODIUM 40 MILLIGRAM(S): 20 TABLET, DELAYED RELEASE ORAL at 11:51

## 2019-12-02 NOTE — BEHAVIORAL HEALTH ASSESSMENT NOTE - NSBHMEDSOTHERFT_PSY_A_CORE
Seroquel 50mg po bid, percpcet q6hrs, Metformin 500mg PO bid; rouvastatin 10mg po qd, metoprolol 50mg po qd; Paxil 20mg PO qd (last taken Friday prior to admission), diazepam 5mg PO TID - has been daily since Friday; risperdal 0.5mg PO bid; Xarelto 20mg po qd; Farxiqa 5mg po qd; Cefdinir 300mg po qd; haldol 1mg PO qd - does not take at this time

## 2019-12-02 NOTE — BEHAVIORAL HEALTH ASSESSMENT NOTE - RISK ASSESSMENT
Low Acute Suicide Risk low risk for intentional, planned out and executed harm to self or others at this time given advanced age, physical frailty and current confusion. More likely to unintentionally/accidentally lash out at caretakers during ADL assistance or hurt self by trying to climb out of bed/pulls lines etc secondary to her confused state.

## 2019-12-02 NOTE — BEHAVIORAL HEALTH ASSESSMENT NOTE - KNOWN PSYCHIATRIC ADMISSION WITHIN THE PAST 30 DAYS
"Chief Complaint   Patient presents with     Musculoskeletal Problem     foot injury swollen, pain and throbbing- 1 day       Initial BP (!) 133/94 (BP Location: Left arm, Patient Position: Chair, Cuff Size: Adult Large)  Pulse 74  Temp 98.2  F (36.8  C) (Oral)  Wt 217 lb 12.8 oz (98.8 kg)  SpO2 98%  BMI 30.81 kg/m2 Estimated body mass index is 30.81 kg/(m^2) as calculated from the following:    Height as of 4/6/17: 5' 10.5\" (1.791 m).    Weight as of this encounter: 217 lb 12.8 oz (98.8 kg).  Medication Reconciliation: complete     Yue Rios MA      " No

## 2019-12-02 NOTE — OCCUPATIONAL THERAPY INITIAL EVALUATION ADULT - ADDITIONAL COMMENTS
Prior to admission, pt was non ambulatory and required use of an arjo lift for bed to chair transfer.  Pt has a hospital bed, commode  and w/C. Pt's daughter reported that  pt fell twice in the past 4 months. Currently,  pt requires total assistance for all level of care due to decondition and generalized weakness in all extremities. Prior to admission, pt was non ambulatory and required use of an arjo lift for bed to chair transfer.  Pt has a hospital bed, commode  and w/C. Pt's daughter reported that  pt fell twice in the past 4 months. Currently,  pt requires total assistance for all levels of care due to decondition and generalized weakness in all extremities.

## 2019-12-02 NOTE — BEHAVIORAL HEALTH ASSESSMENT NOTE - DIFFERENTIAL
hypoactive delirium; underlying baseline dementia hypoactive delirium; underlying baseline dementia - likely Vascular subtype

## 2019-12-02 NOTE — BEHAVIORAL HEALTH ASSESSMENT NOTE - VIOLENCE RISK FACTORS:
Type 2 diabetes mellitus with complication, unspecified long term insulin use status Essential hypertension Essential hypertension Type 2 diabetes mellitus with complication, unspecified long term insulin use status Essential hypertension Essential hypertension Other

## 2019-12-02 NOTE — OCCUPATIONAL THERAPY INITIAL EVALUATION ADULT - PERTINENT HX OF CURRENT PROBLEM, REHAB EVAL
Pt is 75 y/o female admitted with dx of hypoxia, respiratory failure and required intubation. Pt has failed several weaning trails CT Angio of chest on 11/25/19 results showed  Left lower lobe airspace consolidation may reflect atelectasis Pt is 75 y/o female admitted with dx of hypoxia, respiratory failure and required intubation. Pt has failed several weaning trails . CT Angio of chest on 11/25/19 results showed  Left lower lobe airspace consolidation may reflect atelectasis

## 2019-12-02 NOTE — BEHAVIORAL HEALTH ASSESSMENT NOTE - HPI (INCLUDE ILLNESS QUALITY, SEVERITY, DURATION, TIMING, CONTEXT, MODIFYING FACTORS, ASSOCIATED SIGNS AND SYMPTOMS)
prescribed Valium 5mg PO 4x/day (before that was on Xanax 0.5mg PO TID), Percocet 5/325mg PO 8 tabs /day as per most recent prescription (ISTOP checked; RX given 11/9/19), prescriber Internist    ISTOP Reference #: [id number]   11/07/2019 11/09/2019 diazepam 5 mg tablet  120 30 D'Sa, Zarina BECKHAM MD     11/07/2019 11/09/2019 oxycodone-acetaminophen 5-325 mg tablet  120 15 D'Sa, Zarina BECKHAM MD     10/07/2019 10/07/2019 hydrocodone-acetaminophen 5-325 mg tablet  60 30 D'Sa, Zarina BECKHAM MD     09/24/2019 09/26/2019 diazepam 5 mg tablet  120 30 Joey Caicedo MD     09/20/2019 09/20/2019 diazepam 5 mg tablet  14 7 Renuka Gibson DO     09/06/2019 09/07/2019 alprazolam 0.5 mg tablet  90 30 D'Sa, Zarina BECKHAM MD     08/15/2019 08/15/2019 hydrocodone-acetaminophen 5-325 mg tablet  60 30 D'Sa, Zarina BECKHAM MD     CVM: no record of Patient 73 yo female, retired, noncaregiver, domiciled with her son in a private home, has a 24 hour HHA, who is prescribed Valium 5mg PO 4x/day (before that was on Xanax 0.5mg PO TID), Percocet 5/325mg PO 8 tabs /day as per most recent prescription (ISTOP checked; RX given 11/9/19; prescriber Internist), who presented from home for shortness of breath, coughing with mucous congestion, couldn't swallow pills, + altered mental status x 3 days. Multiple visits in last month for UTI. Patient intubated in the ED and admitted to ICU.  Diagnosed with acute hypoxemic respiratory failure in the setting of LLL pneumonia, UTI, septic shock. Patient at home on risperdal 0.5mg PO bid, Paxil 20mg PO qd, unclear if trazodone or Seroquel. No pharmacy in chart hence cannot confirm outpatient regimen. Current psychiatric medications most consistent with agitation management in dementia.     ISTOP Reference #: [id number]   11/07/2019 11/09/2019 diazepam 5 mg tablet  120 30 D'Sa, Zarina BECKHAM MD     11/07/2019 11/09/2019 oxycodone-acetaminophen 5-325 mg tablet  120 15 D'Sa, Zarina BECKHAM MD     10/07/2019 10/07/2019 hydrocodone-acetaminophen 5-325 mg tablet  60 30 D'SaZarina MD     09/24/2019 09/26/2019 diazepam 5 mg tablet  120 30 Joey Caicedo MD     09/20/2019 09/20/2019 diazepam 5 mg tablet  14 7 Renuka Gibson DO     09/06/2019 09/07/2019 alprazolam 0.5 mg tablet  90 30 D'SaZarina MD     08/15/2019 08/15/2019 hydrocodone-acetaminophen 5-325 mg tablet  60 30 D'Sa, Zarina BECKHAM MD     CVM: no record of Patient 73 yo  female, retired, noncaregiver, domiciled with her son in a private home, has a 24 hour HHA, who is prescribed Valium 5mg PO 4x/day (before that was on Xanax 0.5mg PO TID), Percocet 5/325mg PO 8 tabs /day as per most recent prescription (ISTOP checked; RX given 11/9/19; prescriber Internist), who presented from home for shortness of breath, coughing with mucous congestion, couldn't swallow pills, + altered mental status x 3 days. Multiple visits in last month for UTI. Patient intubated in the ED and admitted to ICU.  Diagnosed with acute hypoxemic respiratory failure in the setting of LLL pneumonia, UTI, septic shock. Patient at home on risperdal 0.5mg PO bid, Paxil 20mg PO qd, unclear if trazodone or Seroquel. No pharmacy in chart hence cannot confirm outpatient regimen. Current psychiatric medications most consistent with agitation management in dementia.     EXAM: Patient is still intubated with trial extubation at bedside. Eyes closed, sedated.     ISTOP Reference #: [id number]   11/07/2019 11/09/2019 diazepam 5 mg tablet  120 30 D'Sa, Zarina BECKHAM MD     11/07/2019 11/09/2019 oxycodone-acetaminophen 5-325 mg tablet  120 15 D'Sa, Zarina BECKHAM MD     10/07/2019 10/07/2019 hydrocodone-acetaminophen 5-325 mg tablet  60 30 D'Sa, Zarina BECKHAM MD     09/24/2019 09/26/2019 diazepam 5 mg tablet  120 30 Joey Caicedo MD     09/20/2019 09/20/2019 diazepam 5 mg tablet  14 7 Renuka Gibson DO     09/06/2019 09/07/2019 alprazolam 0.5 mg tablet  90 30 D'SaZarina MD     08/15/2019 08/15/2019 hydrocodone-acetaminophen 5-325 mg tablet  60 30 D'Sa, Zarina BECKHAM MD     CVM: no record of Patient 73 yo  female, retired, noncaregiver, domiciled in a private home with a team of 24 hour HHA (family pays for it), with no formal psychiatric history, + has shown deficits in short term memory/irritability/episodes of paranoia - disorientation), + bilateral decreased vision, + bed bound due to severe left knee OA, who is prescribed Valium 5mg PO 4x/day (before that was on Xanax 0.5mg PO TID), Percocet 5/325mg PO 8 tabs /day as per most recent prescription (ISTOP checked; RX given 11/9/19; prescriber Internist), + had delirium and agitation at [facility] resulting in [facility] Psychiatrist giving her haldol and Seroquel (sedated from it), who presented from home for shortness of breath, coughing with mucous congestion, couldn't swallow pills, + altered mental status x 3 days. Multiple visits in last month for UTI. Patient intubated in the ED and admitted to ICU.  Diagnosed with acute hypoxemic respiratory failure in the setting of LLL pneumonia, UTI, septic shock. Patient at home on risperdal 0.5mg PO bid, Paxil 20mg PO qd, unclear if trazodone or Seroquel. No pharmacy in chart hence cannot confirm outpatient regimen. Current psychiatric medications most consistent with agitation management in dementia.     EXAM: Patient is still intubated with trial extubation at bedside. Eyes closed, sedated.     COLLATERAL FROM DAUGHTER MARCIA: Patient had personality peculiarities throughout her life including being manipulative, mean and inconsistent caretaker (ie. refused to let kids leave the house at times; punished daughter for 6 months for wearing blue mascara). Patient has been her usual self until admission to [facility] ~ 2 yrs ago up until then, she has not seen any doctors for 15 years. She was diagnosed with "pretty much everything" at that time including cataracts, vision deficits, retinal detachment, severe OA of the left knee, spinal spondylosis. Patient has manifested some short term memory deficits in the llast >? 1 yr; more pronounced personality traits including manipulating others, occasional disorientation such as calling daughter at 2am saying "I am not in my house!" but she is. As per a Neurologist, Patient has shown sxs of dementia already. As per daughter, Patient did worst after Xanax was changed to Valium and she was overly sedated on Seroquel. + hx of aggressive behavior including throwing remote at daughter, kicking others.     ISTOP Reference #: [id number]   11/07/2019 11/09/2019 diazepam 5 mg tablet  120 30 D'Sa, Zarina BECKHAM MD     11/07/2019 11/09/2019 oxycodone-acetaminophen 5-325 mg tablet  120 15 D'Sa, Zarina BECKHAM MD     10/07/2019 10/07/2019 hydrocodone-acetaminophen 5-325 mg tablet  60 30 D'Sa, Zarina BECKHAM MD     09/24/2019 09/26/2019 diazepam 5 mg tablet  120 30 Joey Caicedo MD     09/20/2019 09/20/2019 diazepam 5 mg tablet  14 7 Renuka Gibson DO     09/06/2019 09/07/2019 alprazolam 0.5 mg tablet  90 30 D'Sa, Zarina BECKHAM MD     08/15/2019 08/15/2019 hydrocodone-acetaminophen 5-325 mg tablet  60 30 D'Sa, Zarina BECKHAM MD     CVM: no record of Patient

## 2019-12-02 NOTE — OCCUPATIONAL THERAPY INITIAL EVALUATION ADULT - PATIENT/FAMILY/SIGNIFICANT OTHER GOALS STATEMENT, OT EVAL
Pt' daughter  would like to be restored to prior level of function so that she can sit upright in her w/c and interact with her surroundings.. Pt's daughter would like pt to be restored to prior level of function so that she can sit upright in her w/c and interact with her surroundings..

## 2019-12-02 NOTE — BEHAVIORAL HEALTH ASSESSMENT NOTE - NSBHCHARTREVIEWIMAGING_PSY_A_CORE FT
CT head There is diffuse cerebral volume loss with prominence of the sulci, fissures, and cisternal spaces which is normal for the patient's age.   There is mild deep and periventricular white matter hypoattenuation statistically compatible with microvascular changes given calcific atherosclerotic disease of the intracranial arteries.

## 2019-12-02 NOTE — CONSULT NOTE ADULT - SUBJECTIVE AND OBJECTIVE BOX
HPI:  75 yo F bibems for acute resp failure.  Pt. has been reportedly sob and altered for 3 days.  Patient can not give further history due to condition.  Noted to be hypoxic  and blue colored.  No other complaints or inciting event.  No family at bedside now. Disoriented for last few days 4 days, sleeping, coughing with mucous and congestion, can't swallow pills.  Pt. has 24 hour HHA, lives with son. As per son increased secretions last few day, Increased lethargy.  Multiple visits in last month for UTI.  In ER noted to be hypoxic requiring intubation. Received 3 liter IV fluid Lacate 2.3.  ICU called for evaluation and management (25 Nov 2019 16:53)  Presently intubated and nonresponcive      PAST MEDICAL & SURGICAL HISTORY:  Recurrent UTI  Dementia, senile with depression  COPD with respiratory failure, acute  Dementia  HTN (hypertension)  Afib  DM (diabetes mellitus)        MEDICATIONS  (STANDING):  albuterol/ipratropium for Nebulization 3 milliLiter(s) Nebulizer every 6 hours  ceFAZolin   IVPB 2000 milliGRAM(s) IV Intermittent every 8 hours  chlorhexidine 0.12% Liquid 15 milliLiter(s) Oral Mucosa every 12 hours  chlorhexidine 4% Liquid 1 Application(s) Topical <User Schedule>  dextrose 5%. 1000 milliLiter(s) (50 mL/Hr) IV Continuous <Continuous>  dextrose 50% Injectable 12.5 Gram(s) IV Push once  dextrose 50% Injectable 25 Gram(s) IV Push once  dextrose 50% Injectable 25 Gram(s) IV Push once  enoxaparin Injectable 80 milliGRAM(s) SubCutaneous every 12 hours  erythromycin   Ointment 1 Application(s) Left EYE two times a day  insulin lispro (HumaLOG) corrective regimen sliding scale   SubCutaneous every 6 hours  midazolam Injectable 1 milliGRAM(s) IV Push every 8 hours  midodrine 10 milliGRAM(s) Oral every 8 hours  pantoprazole   Suspension 40 milliGRAM(s) Oral daily  polyethylene glycol 3350 17 Gram(s) Oral daily  sodium chloride 3%  Inhalation 3 milliLiter(s) Inhalation every 6 hours    MEDICATIONS  (PRN):  acetaminophen   Tablet .. 650 milliGRAM(s) Oral every 6 hours PRN Temp greater or equal to 38C (100.4F)  dextrose 40% Gel 15 Gram(s) Oral once PRN Blood Glucose LESS THAN 70 milliGRAM(s)/deciliter  fentaNYL    Injectable 50 MICROGram(s) IV Push every 4 hours PRN Moderate Pain (4 - 6)  glucagon  Injectable 1 milliGRAM(s) IntraMuscular once PRN Glucose LESS THAN 70 milligrams/deciliter      Allergies    No Known Allergies    Intolerances        SOCIAL HISTORY:Smoking history  Alcohol history  Drug history    FAMILY HISTORY:      Vital Signs Last 24 Hrs  T(C): 36.8 (02 Dec 2019 04:10), Max: 37.8 (01 Dec 2019 09:00)  T(F): 98.3 (02 Dec 2019 04:10), Max: 100.1 (01 Dec 2019 10:00)  HR: 103 (02 Dec 2019 07:00) (75 - 123)  BP: 112/56 (02 Dec 2019 07:00) (80/53 - 127/56)  BP(mean): 68 (02 Dec 2019 07:00) (57 - 80)  RR: 33 (02 Dec 2019 07:00) (19 - 37)  SpO2: 91% (02 Dec 2019 07:00) (90% - 100%)    PHYSICAL EXAM:    GENERAL:Elderly white female intubated    NERVOUS SYSTEM: non Alert & Oriented   Insensate feet with atrophied muscles lower extremity and left leg externally rotated. Feey in a fixed plantargrade position    EXTREMITIES:  non Peripheral Pulses,   No clubbing, cyanosis, or edema   Vascularity Skin appearance hair absent warm and supple  LYMPH: No lymphadenopathy noted  SKIN: No rashes or lesions  ORTHOPEDIC: foot deformities rectus with external rotated   Preulcer sile lateral left maleoli with no break in skin    Nails long thick and cumbly consisitent with dermatophye    LABS:                        11.1   6.12  )-----------( 174      ( 02 Dec 2019 03:10 )             36.1     12-02    139  |  104  |  9   ----------------------------<  176<H>  3.8   |  30  |  0.34<L>    Ca    8.8      02 Dec 2019 03:10  Phos  3.1     12-02  Mg     2.3     12-02                RADIOLOGY & ADDITIONAL STUDIES:

## 2019-12-02 NOTE — OCCUPATIONAL THERAPY INITIAL EVALUATION ADULT - BED MOBILITY TRAINING, PT EVAL
Pt will increased BUE/LE muscle strength by 1 full to enable pt to assist with self care tasks & positioning for pressure relief within 12 weeks

## 2019-12-02 NOTE — BEHAVIORAL HEALTH ASSESSMENT NOTE - OTHER
n/a - eyes close, sleeping/sedated has been appropriate / was sedated deferred at this time unable to ascertain at this time unclear at this time see HPI to be determined once able to tolerate an assessment/conversation

## 2019-12-02 NOTE — PROGRESS NOTE ADULT - SUBJECTIVE AND OBJECTIVE BOX
HPI:  75 yo F bibems for acute resp failure.  Pt. has been reportedly sob and altered for 3 days.  Patient can not give further history due to condition.  Noted to be hypoxic  and blue colored.  No other complaints or inciting event.  No family at bedside now. Disoriented for last few days 4 days, sleeping, coughing with mucous and congestion, can't swallow pills.  Pt. has 24 hour HHA, lives with son. As per son increased secretions last few day, Increased lethargy.  Multiple visits in last month for UTI.  In ER noted to be hypoxic requiring intubation. Received 3 liter IV fluid Lacate 2.3.  ICU called for evaluation and management (25 Nov 2019 16:53)      24 hr events:      ## ROS:  [ ] unable to obtain  CONSTITUTIONAL: No fever, weight loss, or fatigue  EYES: No eye pain, visual disturbances, or discharge  ENMT:  No difficulty hearing, tinnitus, vertigo; No sinus or throat pain  NECK: No pain or stiffness  RESPIRATORY: No cough, wheezing, chills or hemoptysis; No shortness of breath  CARDIOVASCULAR: No chest pain, palpitations, dizziness, or leg swelling  GASTROINTESTINAL: No abdominal or epigastric pain. No nausea, vomiting, or hematemesis; No diarrhea or constipation. No melena or hematochezia.  GENITOURINARY: No dysuria, frequency, hematuria, or incontinence  NEUROLOGICAL: No headaches, memory loss, loss of strength, numbness, or tremors  SKIN: No itching, burning, rashes, or lesions   LYMPH NODES: No enlarged glands  ENDOCRINE: No heat or cold intolerance; No hair loss  MUSCULOSKELETAL: No joint pain or swelling; No muscle, back, or extremity pain  PSYCHIATRIC: No depression, anxiety, mood swings, or difficulty sleeping  HEME/LYMPH: No easy bruising, or bleeding gums  ALLERGY AND IMMUNOLOGIC: No hives or eczema    ## Labs:  CBC:                        11.1   6.12  )-----------( 174      ( 02 Dec 2019 03:10 )             36.1     Chem:  12-02    139  |  104  |  9   ----------------------------<  176<H>  3.8   |  30  |  0.34<L>    Ca    8.8      02 Dec 2019 03:10  Phos  3.1     12-02  Mg     2.3     12-02      Coags:          ## Imaging:    ## Medications:  ceFAZolin   IVPB 2000 milliGRAM(s) IV Intermittent every 8 hours    midodrine 10 milliGRAM(s) Oral every 8 hours    albuterol/ipratropium for Nebulization 3 milliLiter(s) Nebulizer every 6 hours  sodium chloride 3%  Inhalation 3 milliLiter(s) Inhalation every 6 hours    dextrose 40% Gel 15 Gram(s) Oral once PRN  dextrose 50% Injectable 12.5 Gram(s) IV Push once  dextrose 50% Injectable 25 Gram(s) IV Push once  dextrose 50% Injectable 25 Gram(s) IV Push once  glucagon  Injectable 1 milliGRAM(s) IntraMuscular once PRN  insulin lispro (HumaLOG) corrective regimen sliding scale   SubCutaneous every 6 hours    enoxaparin Injectable 80 milliGRAM(s) SubCutaneous every 12 hours    pantoprazole   Suspension 40 milliGRAM(s) Oral daily  polyethylene glycol 3350 17 Gram(s) Oral daily    acetaminophen   Tablet .. 650 milliGRAM(s) Oral every 6 hours PRN  fentaNYL    Injectable 50 MICROGram(s) IV Push every 4 hours PRN  midazolam Injectable 1 milliGRAM(s) IV Push every 8 hours      ## Vitals:  T(C): 36.2 (12-02-19 @ 16:07), Max: 37.9 (12-02-19 @ 13:00)  HR: 98 (12-02-19 @ 18:00) (75 - 120)  BP: 110/51 (12-02-19 @ 18:00) (82/46 - 121/59)  BP(mean): 65 (12-02-19 @ 18:00) (53 - 75)  RR: 29 (12-02-19 @ 18:00) (19 - 38)  SpO2: 97% (12-02-19 @ 18:00) (90% - 100%)  Wt(kg): --  Vent: Mode: CPAP with PS, RR (patient): 34, FiO2: 35, PEEP: 5, PS: 5, PIP: 11  ABG: ABG - ( 02 Dec 2019 14:53 )  pH, Arterial: x     pH, Blood: 7.49  /  pCO2: 42    /  pO2: 79    / HCO3: 32    / Base Excess: 8.2   /  SaO2: 96                    12-01 @ 07:01  -  12-02 @ 07:00  --------------------------------------------------------  IN: 2850 mL / OUT: 1350 mL / NET: 1500 mL    12-02 @ 07:01  -  12-02 @ 19:05  --------------------------------------------------------  IN: 750 mL / OUT: 900 mL / NET: -150 mL          ## P/E:  Gen: lying comfortably in bed in no apparent distress  HEENT: OSCAR, EOMI  Resp: CTA B/L no c/r/w  CVS: S1S2 no m/r/g  Abd: soft NT/ND +BS  Ext: no c/c/e  Neuro: A&Ox3    CENTRAL LINE: [ ] YES [ ] NO  LOCATION:   DATE INSERTED:  REMOVE: [ ] YES [ ] NO      BAUDILIO: [ ] YES [ ] NO    DATE INSERTED:  REMOVE:  [ ] YES [ ] NO      A-LINE:  [ ] YES [ ] NO  LOCATION:   DATE INSERTED:  REMOVE:  [ ] YES [ ] NO  EXPLAIN:    GLOBAL ISSUE/BEST PRACTICE:  Analgesia:  Sedation:  HOB elevation: yes  Stress ulcer prophylaxis:  VTE prophylaxis:  Oral Care:  Glycemic control:  Nutrition:    CODE STATUS: [ ] full code  [ ] DNR  [ ] DNI  [ ] [facility]  Goals of care discussion: [ ] yes

## 2019-12-02 NOTE — BEHAVIORAL HEALTH ASSESSMENT NOTE - NSBHCHARTREVIEWVS_PSY_A_CORE FT
T(C): 37.9 (12-02-19 @ 13:00), Max: 37.9 (12-02-19 @ 13:00)  HR: 97 (12-02-19 @ 15:00) (75 - 120)  BP: 109/56 (12-02-19 @ 15:00) (80/53 - 127/56)  RR: 24 (12-02-19 @ 15:00) (19 - 38)  SpO2: 95% (12-02-19 @ 15:00) (90% - 100%)

## 2019-12-02 NOTE — OCCUPATIONAL THERAPY INITIAL EVALUATION ADULT - PRECAUTIONS/LIMITATIONS, REHAB EVAL
Pt should be turned & positioned every 2 hours to prevent skin breakdown due to lack of mobility/obesity precautions/aspiration precautions/oxygen therapy device and L/min/cardiac precautions aspiration precautions/cardiac precautions/vision precautions/Pt is blind as per daughter.  Pt should be turned & positioned every 2 hours to prevent skin breakdown due to lack of mobility/oxygen therapy device and L/min/obesity precautions aspiration precautions/cardiac precautions/oxygen therapy device and L/min/Pt is blind as per daughter.  Pt should be turned & positioned every 2 hours to prevent skin breakdown due to lack of mobility/obesity precautions

## 2019-12-02 NOTE — OCCUPATIONAL THERAPY INITIAL EVALUATION ADULT - SOCIAL CONCERNS
Complex psychosocial needs/coping issues/Pt's daughter  voiced concerns about her functional decline since the passing  2 1/2 years ago Pt's daughter voiced concerns about her mother's functional decline since the passing of her   2 1/2 years ago/Complex psychosocial needs/coping issues

## 2019-12-02 NOTE — OCCUPATIONAL THERAPY INITIAL EVALUATION ADULT - GENERAL OBSERVATIONS, REHAB EVAL
Pt was seen for initial OT consult, encountered in bed on cardiac monitoring, continuos oximetry & is  orally intubated. BP to right arm, IV heplock to both wrists & OG feeding tube in place. Pt's daughter is at bedside.  Pt was lethargic did not respond to name or tactile stimuli.

## 2019-12-02 NOTE — BEHAVIORAL HEALTH ASSESSMENT NOTE - AXIS III
Afib; COPD; dementia ; DM (diabetes mellitus); HTN (hypertension); Recurrent UTIs Afib; COPD; dementia ; DM (diabetes mellitus); HTN (hypertension); Recurrent UTIs; cataracts, vision deficits, retinal detachment, severe OA of the left knee, spinal spondylosis

## 2019-12-02 NOTE — BEHAVIORAL HEALTH ASSESSMENT NOTE - SUMMARY
Paxil is associated with withdrawal symptoms. The intensity of the withdrawal clinical signs depends on the daily dose and length of use. Main signs are dizziness, vertigo, headache, nausea, and flu-like symptoms as well as anxiety, confusion, irritability, excessive dreaming and insomnia.  - unlikely that Paxil withdrawal is the case here; especially that she has been here since 11/25/19 so whatever withdrawal sxs she had was masked/not felt by sedation/intubation etc so not distressing Paxil is associated with withdrawal symptoms. The intensity of the withdrawal clinical signs depends on the daily dose and length of use. Main signs are dizziness, vertigo, headache, nausea, and flu-like symptoms as well as anxiety, confusion, irritability, excessive dreaming and insomnia.  - unlikely that Paxil withdrawal is the case here; especially that she has been here since 11/25/19 so whatever withdrawal sxs she had was masked/not felt by sedation/intubation etc so not distressing  - difficulty with extubation likely the synergistic result of age, pre-existing impaired brain (see CT head), cognitive decline/dementia, multiple medical conditions including infection/sepsis, respiratory failure. Paxil is associated with withdrawal symptoms. The intensity of the withdrawal clinical signs depends on the daily dose and length of use. Main signs are dizziness, vertigo, headache, nausea, and flu-like symptoms as well as anxiety, confusion, irritability, excessive dreaming and insomnia.  - unlikely that Paxil withdrawal is the case here; especially that she has been here since 11/25/19 so whatever withdrawal sxs she had was masked/not felt by sedation/intubation etc so not distressing  - difficulty with extubation likely the synergistic result of age, pre-existing impaired brain (see CT head; previous dementia diagnosis), cognitive decline/dementia, multiple medical conditions including infection/sepsis, respiratory failure w/ intubation

## 2019-12-03 LAB
ANION GAP SERPL CALC-SCNC: 7 MMOL/L — SIGNIFICANT CHANGE UP (ref 5–17)
BASE EXCESS BLDA CALC-SCNC: 7 MMOL/L — HIGH (ref -2–2)
BLOOD GAS COMMENTS: SIGNIFICANT CHANGE UP
BLOOD GAS COMMENTS: SIGNIFICANT CHANGE UP
BLOOD GAS SOURCE: SIGNIFICANT CHANGE UP
BUN SERPL-MCNC: 8 MG/DL — SIGNIFICANT CHANGE UP (ref 7–23)
CALCIUM SERPL-MCNC: 8.9 MG/DL — SIGNIFICANT CHANGE UP (ref 8.5–10.1)
CHLORIDE SERPL-SCNC: 103 MMOL/L — SIGNIFICANT CHANGE UP (ref 96–108)
CO2 SERPL-SCNC: 31 MMOL/L — SIGNIFICANT CHANGE UP (ref 22–31)
CREAT SERPL-MCNC: 0.32 MG/DL — LOW (ref 0.5–1.3)
GLUCOSE BLDC GLUCOMTR-MCNC: 132 MG/DL — HIGH (ref 70–99)
GLUCOSE BLDC GLUCOMTR-MCNC: 138 MG/DL — HIGH (ref 70–99)
GLUCOSE BLDC GLUCOMTR-MCNC: 139 MG/DL — HIGH (ref 70–99)
GLUCOSE BLDC GLUCOMTR-MCNC: 141 MG/DL — HIGH (ref 70–99)
GLUCOSE BLDC GLUCOMTR-MCNC: 146 MG/DL — HIGH (ref 70–99)
GLUCOSE SERPL-MCNC: 144 MG/DL — HIGH (ref 70–99)
HCO3 BLDA-SCNC: 31 MMOL/L — HIGH (ref 21–29)
HCT VFR BLD CALC: 37.3 % — SIGNIFICANT CHANGE UP (ref 34.5–45)
HGB BLD-MCNC: 11.6 G/DL — SIGNIFICANT CHANGE UP (ref 11.5–15.5)
HOROWITZ INDEX BLDA+IHG-RTO: SIGNIFICANT CHANGE UP
MAGNESIUM SERPL-MCNC: 2.3 MG/DL — SIGNIFICANT CHANGE UP (ref 1.6–2.6)
MCHC RBC-ENTMCNC: 28.5 PG — SIGNIFICANT CHANGE UP (ref 27–34)
MCHC RBC-ENTMCNC: 31.1 GM/DL — LOW (ref 32–36)
MCV RBC AUTO: 91.6 FL — SIGNIFICANT CHANGE UP (ref 80–100)
NRBC # BLD: 0 /100 WBCS — SIGNIFICANT CHANGE UP (ref 0–0)
PCO2 BLDA: 40 MMHG — SIGNIFICANT CHANGE UP (ref 32–46)
PH BLD: 7.49 — HIGH (ref 7.35–7.45)
PHOSPHATE SERPL-MCNC: 3.1 MG/DL — SIGNIFICANT CHANGE UP (ref 2.5–4.5)
PLATELET # BLD AUTO: 224 K/UL — SIGNIFICANT CHANGE UP (ref 150–400)
PO2 BLDA: 73 MMHG — LOW (ref 74–108)
POTASSIUM SERPL-MCNC: 3.8 MMOL/L — SIGNIFICANT CHANGE UP (ref 3.5–5.3)
POTASSIUM SERPL-SCNC: 3.8 MMOL/L — SIGNIFICANT CHANGE UP (ref 3.5–5.3)
RBC # BLD: 4.07 M/UL — SIGNIFICANT CHANGE UP (ref 3.8–5.2)
RBC # FLD: 14.6 % — HIGH (ref 10.3–14.5)
SAO2 % BLDA: 95 % — SIGNIFICANT CHANGE UP (ref 92–96)
SODIUM SERPL-SCNC: 141 MMOL/L — SIGNIFICANT CHANGE UP (ref 135–145)
WBC # BLD: 6.62 K/UL — SIGNIFICANT CHANGE UP (ref 3.8–10.5)
WBC # FLD AUTO: 6.62 K/UL — SIGNIFICANT CHANGE UP (ref 3.8–10.5)

## 2019-12-03 PROCEDURE — 99231 SBSQ HOSP IP/OBS SF/LOW 25: CPT

## 2019-12-03 RX ADMIN — Medication 100 MILLIGRAM(S): at 13:43

## 2019-12-03 RX ADMIN — Medication 3 MILLILITER(S): at 11:12

## 2019-12-03 RX ADMIN — ENOXAPARIN SODIUM 80 MILLIGRAM(S): 100 INJECTION SUBCUTANEOUS at 17:41

## 2019-12-03 RX ADMIN — CHLORHEXIDINE GLUCONATE 1 APPLICATION(S): 213 SOLUTION TOPICAL at 12:46

## 2019-12-03 RX ADMIN — Medication 100 MILLIGRAM(S): at 06:23

## 2019-12-03 RX ADMIN — SODIUM CHLORIDE 3 MILLILITER(S): 9 INJECTION INTRAMUSCULAR; INTRAVENOUS; SUBCUTANEOUS at 05:15

## 2019-12-03 RX ADMIN — SODIUM CHLORIDE 3 MILLILITER(S): 9 INJECTION INTRAMUSCULAR; INTRAVENOUS; SUBCUTANEOUS at 00:30

## 2019-12-03 RX ADMIN — Medication 3 MILLILITER(S): at 00:30

## 2019-12-03 RX ADMIN — Medication 3 MILLILITER(S): at 05:15

## 2019-12-03 RX ADMIN — Medication 100 MILLIGRAM(S): at 21:11

## 2019-12-03 RX ADMIN — ENOXAPARIN SODIUM 80 MILLIGRAM(S): 100 INJECTION SUBCUTANEOUS at 06:27

## 2019-12-03 RX ADMIN — Medication 1 APPLICATION(S): at 17:42

## 2019-12-03 RX ADMIN — SODIUM CHLORIDE 3 MILLILITER(S): 9 INJECTION INTRAMUSCULAR; INTRAVENOUS; SUBCUTANEOUS at 11:12

## 2019-12-03 RX ADMIN — Medication 3 MILLILITER(S): at 17:00

## 2019-12-03 RX ADMIN — SODIUM CHLORIDE 3 MILLILITER(S): 9 INJECTION INTRAMUSCULAR; INTRAVENOUS; SUBCUTANEOUS at 17:00

## 2019-12-03 RX ADMIN — Medication 1 APPLICATION(S): at 06:23

## 2019-12-03 NOTE — SWALLOW BEDSIDE ASSESSMENT ADULT - ORAL PHASE
Lingual stasis/Delayed oral transit time/Decreased anterior-posterior movement of the bolus Decreased anterior-posterior movement of the bolus

## 2019-12-03 NOTE — PROGRESS NOTE BEHAVIORAL HEALTH - NSBHCHARTREVIEWVS_PSY_A_CORE FT
T(C): 37.6 (12-03-19 @ 12:42), Max: 37.6 (12-03-19 @ 12:42)  HR: 109 (12-03-19 @ 13:00) (92 - 124)  BP: 106/48 (12-03-19 @ 13:00) (100/45 - 127/56)  RR: 27 (12-03-19 @ 13:00) (17 - 33)  SpO2: 93% (12-03-19 @ 13:00) (60% - 100%)

## 2019-12-03 NOTE — SWALLOW BEDSIDE ASSESSMENT ADULT - PHARYNGEAL PHASE
Delayed pharyngeal swallow/Decreased laryngeal elevation Decreased laryngeal elevation/Delayed pharyngeal swallow/Cough post oral intake/Throat clear post oral intake

## 2019-12-03 NOTE — CHART NOTE - NSCHARTNOTEFT_GEN_A_CORE
Assessment:     Factors impacting intake: [ ] none [ ] nausea  [ ] vomiting [ ] diarrhea [ ] constipation  [ ]chewing problems [ ] swallowing issues  [ ] other:     Diet Prescription:   Intake:     Current Weight:   % Weight Change    Physical appearance:     Pertinent Medications: MEDICATIONS  (STANDING):  albuterol/ipratropium for Nebulization 3 milliLiter(s) Nebulizer every 6 hours  ceFAZolin   IVPB 2000 milliGRAM(s) IV Intermittent every 8 hours  chlorhexidine 4% Liquid 1 Application(s) Topical <User Schedule>  dextrose 5%. 1000 milliLiter(s) (50 mL/Hr) IV Continuous <Continuous>  dextrose 50% Injectable 12.5 Gram(s) IV Push once  dextrose 50% Injectable 25 Gram(s) IV Push once  dextrose 50% Injectable 25 Gram(s) IV Push once  enoxaparin Injectable 80 milliGRAM(s) SubCutaneous every 12 hours  erythromycin   Ointment 1 Application(s) Left EYE two times a day  insulin lispro (HumaLOG) corrective regimen sliding scale   SubCutaneous every 6 hours  midodrine 10 milliGRAM(s) Oral every 8 hours  pantoprazole   Suspension 40 milliGRAM(s) Oral daily  polyethylene glycol 3350 17 Gram(s) Oral daily  sodium chloride 3%  Inhalation 3 milliLiter(s) Inhalation every 6 hours    MEDICATIONS  (PRN):  acetaminophen   Tablet .. 650 milliGRAM(s) Oral every 6 hours PRN Temp greater or equal to 38C (100.4F)  dextrose 40% Gel 15 Gram(s) Oral once PRN Blood Glucose LESS THAN 70 milliGRAM(s)/deciliter  fentaNYL    Injectable 50 MICROGram(s) IV Push every 4 hours PRN Moderate Pain (4 - 6)  glucagon  Injectable 1 milliGRAM(s) IntraMuscular once PRN Glucose LESS THAN 70 milligrams/deciliter    Pertinent Labs: 12-03 Na 141 mmol/L Glu 144 mg/dL<H> K+ 3.8 mmol/L Cr 0.32 mg/dL<L> BUN 8 mg/dL Phos 3.1 mg/dL Alb n/a   PAB n/a   Hgb 11.6 g/dL Hct 37.3 % HgA1C n/a    Glucose, Serum: 144 mg/dL <H>   24Hr FS:139 mg/dL  146 mg/dL  163 mg/dL  178 mg/dL    Skin: Rt elbow stage I, Lt malleolus stage I, Lt buttock stage I    Estimated Needs:   [ ] no change since previous assessment ( )  [ ] recalculated:     Previous Nutrition Diagnosis:   Nutrition Diagnosis is [ ] ongoing  [ ] resolved  [ ] improved  [ ] not applicable       New Nutrition Diagnosis: [ ] not applicable       Interventions:   Recommend  [ ] Continue:  [ ] Change Diet To:  [ ] Nutrition Supplement:  [ ] Nutrition Support:  [ ] Other:     Monitoring and Evaluation:   [ ] PO intake [ x ] Tolerance to diet prescription [ x ] weights [ x ] labs[ x ] follow up per protocol  [ ] other: Assessment:     Factors impacting intake: [ ] none [ ] nausea  [ ] vomiting [ ] diarrhea [ ] constipation  [ ]chewing problems [ ] swallowing issues  [ ] other:     Diet Prescription:   Intake:     Current Weight:   % Weight Change    Physical appearance:     Pertinent Medications: MEDICATIONS  (STANDING):  albuterol/ipratropium for Nebulization 3 milliLiter(s) Nebulizer every 6 hours  ceFAZolin   IVPB 2000 milliGRAM(s) IV Intermittent every 8 hours  chlorhexidine 4% Liquid 1 Application(s) Topical <User Schedule>  dextrose 5%. 1000 milliLiter(s) (50 mL/Hr) IV Continuous <Continuous>  dextrose 50% Injectable 12.5 Gram(s) IV Push once  dextrose 50% Injectable 25 Gram(s) IV Push once  dextrose 50% Injectable 25 Gram(s) IV Push once  enoxaparin Injectable 80 milliGRAM(s) SubCutaneous every 12 hours  erythromycin   Ointment 1 Application(s) Left EYE two times a day  insulin lispro (HumaLOG) corrective regimen sliding scale   SubCutaneous every 6 hours  midodrine 10 milliGRAM(s) Oral every 8 hours  pantoprazole   Suspension 40 milliGRAM(s) Oral daily  polyethylene glycol 3350 17 Gram(s) Oral daily  sodium chloride 3%  Inhalation 3 milliLiter(s) Inhalation every 6 hours    MEDICATIONS  (PRN):  acetaminophen   Tablet .. 650 milliGRAM(s) Oral every 6 hours PRN Temp greater or equal to 38C (100.4F)  dextrose 40% Gel 15 Gram(s) Oral once PRN Blood Glucose LESS THAN 70 milliGRAM(s)/deciliter  fentaNYL    Injectable 50 MICROGram(s) IV Push every 4 hours PRN Moderate Pain (4 - 6)  glucagon  Injectable 1 milliGRAM(s) IntraMuscular once PRN Glucose LESS THAN 70 milligrams/deciliter    Pertinent Labs: 12-03 Na 141 mmol/L Glu 144 mg/dL<H> K+ 3.8 mmol/L Cr 0.32 mg/dL<L> BUN 8 mg/dL Phos 3.1 mg/dL Alb n/a   PAB n/a   Hgb 11.6 g/dL Hct 37.3 % HgA1C n/a    Glucose, Serum: 144 mg/dL <H>   24Hr FS:139 mg/dL  146 mg/dL  163 mg/dL  178 mg/dL    Skin: Rt elbow stage I, Lt malleolus stage I, Lt buttock stage I    Estimated Needs:   [x ] no change since previous assessment (11/27/19)  [ ] recalculated:     Previous Nutrition Diagnosis:   Nutrition Diagnostic Terminology #1 Excessive Energy Intake.     Etiology Increased energy intake via NGT feeding.   Signs/Symptoms TF order provides 131% energy needs & 128% protein needs.   Goal/Expected Outcome Pt to meet >75% energy & protein needs via TF; N/A.    Nutrition Diagnosis is [ ] ongoing  [ ] resolved  [ ] improved  [x ] not applicable       New Nutrition Diagnosis: [x ] Inadequate Energy Intake [ ]Inadequate Oral Intake [ ] Excessive Energy Intake   [ ] Underweight [ ] Increased Nutrient Needs [ ] Overweight/Obesity   [ ] Altered GI Function [ ] Unintended Weight Loss [ ] Food & Nutrition Related Knowledge Deficit [ ] Malnutrition     Related to: Decreased ability to consume sufficient energy    As evidenced by: NPO x 1    Goal: Pt to consume >75% meals when medically feasible      Interventions:   Recommend  [ ] Continue:  [x ] Change Diet To: Adv po diet when medically feasible & consider swallow evaluation  [ ] Nutrition Supplement:  [ ] Nutrition Support:  [ ] Other:     Monitoring and Evaluation:   [ ] PO intake [ x ] Tolerance to diet prescription [ x ] weights [ x ] labs[ x ] follow up per protocol  [ ] other: Assessment:  Pt seen for follow-up & critical care. Pt wit hs/p hypoxia with acute resp failure s/p extubated 12/2; no further SOB @ this time. Pt remains drowsy & lethargic. Last BM x 1(loose) (12/3)    Factors impacting intake: [ ] none [ ] nausea  [ ] vomiting [ ] diarrhea [ ] constipation  [ ]chewing problems [ ] swallowing issues  [x ] other: lethargic    Diet Prescription: NPO (12/2/19)  Intake: Pt NPO x 1 day. NGT d/c'd 12/2 due to extubated 12/2. Would consider swallow evaluation when medically feasible to assess pt's ability to resume po diet    Current Weight:  Wt=76.7kg(12/3/19), Wt=74.6kg(11/25)  % Weight Change 2.1kg(3%) x 8 days    Physical appearance: +1 gen edema, +2 edema  Carlos hands    Pertinent Medications: MEDICATIONS  (STANDING):  albuterol/ipratropium for Nebulization 3 milliLiter(s) Nebulizer every 6 hours  ceFAZolin   IVPB 2000 milliGRAM(s) IV Intermittent every 8 hours  chlorhexidine 4% Liquid 1 Application(s) Topical <User Schedule>  dextrose 5%. 1000 milliLiter(s) (50 mL/Hr) IV Continuous <Continuous>  dextrose 50% Injectable 12.5 Gram(s) IV Push once  dextrose 50% Injectable 25 Gram(s) IV Push once  dextrose 50% Injectable 25 Gram(s) IV Push once  enoxaparin Injectable 80 milliGRAM(s) SubCutaneous every 12 hours  erythromycin   Ointment 1 Application(s) Left EYE two times a day  insulin lispro (HumaLOG) corrective regimen sliding scale   SubCutaneous every 6 hours  midodrine 10 milliGRAM(s) Oral every 8 hours  pantoprazole   Suspension 40 milliGRAM(s) Oral daily  polyethylene glycol 3350 17 Gram(s) Oral daily  sodium chloride 3%  Inhalation 3 milliLiter(s) Inhalation every 6 hours    MEDICATIONS  (PRN):  acetaminophen   Tablet .. 650 milliGRAM(s) Oral every 6 hours PRN Temp greater or equal to 38C (100.4F)  dextrose 40% Gel 15 Gram(s) Oral once PRN Blood Glucose LESS THAN 70 milliGRAM(s)/deciliter  fentaNYL    Injectable 50 MICROGram(s) IV Push every 4 hours PRN Moderate Pain (4 - 6)  glucagon  Injectable 1 milliGRAM(s) IntraMuscular once PRN Glucose LESS THAN 70 milligrams/deciliter    Pertinent Labs: 12-03 Na 141 mmol/L Glu 144 mg/dL<H> K+ 3.8 mmol/L Cr 0.32 mg/dL<L> BUN 8 mg/dL Phos 3.1 mg/dL Alb 1.8(11/30)   PAB n/a   Hgb 11.6 g/dL Hct 37.3 % HgA1C n/a    Glucose, Serum: 144 mg/dL <H>   24Hr FS:139 mg/dL  146 mg/dL  163 mg/dL  178 mg/dL    Skin: Rt elbow stage I, Lt malleolus stage I, Lt buttock stage I    Estimated Needs:   [x ] no change since previous assessment (11/27/19)  [ ] recalculated:     Previous Nutrition Diagnosis:   Nutrition Diagnostic Terminology #1 Excessive Energy Intake.     Etiology Increased energy intake via NGT feeding.   Signs/Symptoms TF order provides 131% energy needs & 128% protein needs.   Goal/Expected Outcome Pt to meet >75% energy & protein needs via TF; N/A.    Nutrition Diagnosis is [ ] ongoing  [ ] resolved  [ ] improved  [x ] not applicable       New Nutrition Diagnosis: [x ] Inadequate Energy Intake [ ]Inadequate Oral Intake [ ] Excessive Energy Intake   [ ] Underweight [ ] Increased Nutrient Needs [ ] Overweight/Obesity   [ ] Altered GI Function [ ] Unintended Weight Loss [ ] Food & Nutrition Related Knowledge Deficit [ ] Malnutrition     Related to: Decreased ability to consume sufficient energy    As evidenced by: NPO x 1, lethargy, SOB    Goal: Pt to consume >75% meals when medically feasible      Interventions:   Recommend  [ ] Continue:  [x ] Change Diet To: Adv po diet when medically feasible & consider swallow evaluation  [ ] Nutrition Supplement:  [ ] Nutrition Support:  [ ] Other:     Monitoring and Evaluation:   [ ] PO intake [ x ] Tolerance to diet prescription [ x ] weights [ x ] labs[ x ] follow up per protocol  [ ] other:

## 2019-12-03 NOTE — PROGRESS NOTE ADULT - SUBJECTIVE AND OBJECTIVE BOX
HPI:  75 yo F bibems for acute resp failure.  Pt. has been reportedly sob and altered for 3 days.  Patient can not give further history due to condition.  Noted to be hypoxic  and blue colored.  No other complaints or inciting event.  No family at bedside now. Disoriented for last few days 4 days, sleeping, coughing with mucous and congestion, can't swallow pills.  Pt. has 24 hour HHA, lives with son. As per son increased secretions last few day, Increased lethargy.  Multiple visits in last month for UTI.  In ER noted to be hypoxic requiring intubation. Received 3 liter IV fluid Lacate 2.3.  ICU called for evaluation and management (25 Nov 2019 16:53)      24 hr events:      ## ROS:  [ ] unable to obtain  CONSTITUTIONAL: No fever, weight loss, or fatigue  EYES: No eye pain, visual disturbances, or discharge  ENMT:  No difficulty hearing, tinnitus, vertigo; No sinus or throat pain  NECK: No pain or stiffness  RESPIRATORY: No cough, wheezing, chills or hemoptysis; No shortness of breath  CARDIOVASCULAR: No chest pain, palpitations, dizziness, or leg swelling  GASTROINTESTINAL: No abdominal or epigastric pain. No nausea, vomiting, or hematemesis; No diarrhea or constipation. No melena or hematochezia.  GENITOURINARY: No dysuria, frequency, hematuria, or incontinence  NEUROLOGICAL: No headaches, memory loss, loss of strength, numbness, or tremors  SKIN: No itching, burning, rashes, or lesions   LYMPH NODES: No enlarged glands  ENDOCRINE: No heat or cold intolerance; No hair loss  MUSCULOSKELETAL: No joint pain or swelling; No muscle, back, or extremity pain  PSYCHIATRIC: No depression, anxiety, mood swings, or difficulty sleeping  HEME/LYMPH: No easy bruising, or bleeding gums  ALLERGY AND IMMUNOLOGIC: No hives or eczema    ## Labs:  CBC:                        11.6   6.62  )-----------( 224      ( 03 Dec 2019 04:13 )             37.3     Chem:  12-03    141  |  103  |  8   ----------------------------<  144<H>  3.8   |  31  |  0.32<L>    Ca    8.9      03 Dec 2019 04:13  Phos  3.1     12-03  Mg     2.3     12-03      Coags:          ## Imaging:    ## Medications:  ceFAZolin   IVPB 2000 milliGRAM(s) IV Intermittent every 8 hours    midodrine 10 milliGRAM(s) Oral every 8 hours    albuterol/ipratropium for Nebulization 3 milliLiter(s) Nebulizer every 6 hours  sodium chloride 3%  Inhalation 3 milliLiter(s) Inhalation every 6 hours    dextrose 40% Gel 15 Gram(s) Oral once PRN  dextrose 50% Injectable 12.5 Gram(s) IV Push once  dextrose 50% Injectable 25 Gram(s) IV Push once  dextrose 50% Injectable 25 Gram(s) IV Push once  glucagon  Injectable 1 milliGRAM(s) IntraMuscular once PRN  insulin lispro (HumaLOG) corrective regimen sliding scale   SubCutaneous every 6 hours    enoxaparin Injectable 80 milliGRAM(s) SubCutaneous every 12 hours    polyethylene glycol 3350 17 Gram(s) Oral daily    acetaminophen   Tablet .. 650 milliGRAM(s) Oral every 6 hours PRN  fentaNYL    Injectable 50 MICROGram(s) IV Push every 4 hours PRN      ## Vitals:  T(C): 37.4 (12-03-19 @ 16:32), Max: 37.6 (12-03-19 @ 12:42)  HR: 96 (12-03-19 @ 16:00) (96 - 124)  BP: 90/44 (12-03-19 @ 16:00) (90/44 - 127/56)  BP(mean): 55 (12-03-19 @ 16:00) (55 - 80)  RR: 26 (12-03-19 @ 16:00) (17 - 33)  SpO2: 94% (12-03-19 @ 16:00) (60% - 100%)  Wt(kg): --  Vent:   ABG: ABG - ( 03 Dec 2019 12:10 )  pH, Arterial: x     pH, Blood: 7.49  /  pCO2: 40    /  pO2: 73    / HCO3: 31    / Base Excess: 7.0   /  SaO2: 95                    12-02 @ 07:01  -  12-03 @ 07:00  --------------------------------------------------------  IN: 850 mL / OUT: 1900 mL / NET: -1050 mL          ## P/E:  Gen: lying comfortably in bed in no apparent distress  HEENT: PERRL, EOMI  Resp: CTA B/L no c/r/w  CVS: S1S2 no m/r/g  Abd: soft NT/ND +BS  Ext: no c/c/e  Neuro: A&Ox3    CENTRAL LINE: [ ] YES [ ] NO  LOCATION:   DATE INSERTED:  REMOVE: [ ] YES [ ] NO      ASTUDILLO: [ ] YES [ ] NO    DATE INSERTED:  REMOVE:  [ ] YES [ ] NO      A-LINE:  [ ] YES [ ] NO  LOCATION:   DATE INSERTED:  REMOVE:  [ ] YES [ ] NO  EXPLAIN:    GLOBAL ISSUE/BEST PRACTICE:  Analgesia:  Sedation:  HOB elevation: yes  Stress ulcer prophylaxis:  VTE prophylaxis:  Oral Care:  Glycemic control:  Nutrition:    CODE STATUS: [ ] full code  [ ] DNR  [ ] DNI  [ ] MOLST  Goals of care discussion: [ ] yes

## 2019-12-03 NOTE — PROGRESS NOTE BEHAVIORAL HEALTH - OTHER
has been appropriate / was sedated deferred at this time n/a - eyes close, sleeping/sedated unable to ascertain at this time

## 2019-12-03 NOTE — SWALLOW BEDSIDE ASSESSMENT ADULT - SWALLOW EVAL: DIAGNOSIS
pt presented with moderate-severe oropharyngeal dysphagia marked by reduced cognition/lethargy, reduced labial seal, delayed oral transport/reduced A-P transport of bolus causing lingual stasis, oral holding, suspect delay in swallow trigger with reduced laryngeal elevation, and cough/throat clear with honey thick liquids. suggest pt not safe for PO diet at time pt presented with lethargy impacting performance, however oropharyngeal phases of swallowing marked by reduced cognition, reduced labial seal, delayed oral transport/reduced A-P transport of bolus causing lingual stasis, oral holding, suspect delay in swallow trigger with reduced laryngeal elevation, and cough/throat clear with honey thick liquids. suggest pt not safe for PO diet at time

## 2019-12-03 NOTE — SWALLOW BEDSIDE ASSESSMENT ADULT - SLP GENERAL OBSERVATIONS
pt was seen bedside lethargic and ? oriented to self. pt did not respond to autobiographical/orientation questions except stated "home" when asked her location and inconsistently followed one step directions with max cues from SLP. pt nonverbal except one utterance "home" and essentially noncommunicative except mouth movements when asked a question. noted pt's eyes closed during evaluation pt was seen bedside lethargic and ? oriented to self. pt did not respond to autobiographical/orientation questions except stated "home" when asked her location and inconsistently followed one step directions with max cues from SLP. pt nonverbal except one utterance "home" and essentially noncommunicative except mouth movements when asked a question and a few head nod/shake for yes/no. noted pt's eyes closed during evaluation

## 2019-12-03 NOTE — PROGRESS NOTE BEHAVIORAL HEALTH - NSBHFUPINTERVALHXFT_PSY_A_CORE
Patient lying in bed awake, semi-alert with eyes long term open. She does not engage in any meaningful manner; limited eye contact and does not speak as of now. No psychomotor agitation observed - Patient looks comfortable.

## 2019-12-03 NOTE — SWALLOW BEDSIDE ASSESSMENT ADULT - COMMENTS
CT Head No Cont 11/25/2019 IMPRESSION: No acute intracranial hemorrhage, mass effect, or shift of the midline structures.  CXR 11/28/2019 Impression: Pulmonary vascular congestion. Left retrocardiac opacity with air bronchograms.

## 2019-12-03 NOTE — PROGRESS NOTE BEHAVIORAL HEALTH - NSBHCHARTREVIEWLAB_PSY_A_CORE FT
12-03    141  |  103  |  8   ----------------------------<  144<H>  3.8   |  31  |  0.32<L>    Ca    8.9      03 Dec 2019 04:13  Phos  3.1     12-03  Mg     2.3     12-03

## 2019-12-03 NOTE — PROGRESS NOTE BEHAVIORAL HEALTH - NSBHLOCFT_PSY_A_CORE
semi-alert with eyes [facility] open. She does not engage in any meaningful manner; limited eye contact and does not speak as of now. No psychomotor agitation observed

## 2019-12-04 LAB
ANION GAP SERPL CALC-SCNC: 7 MMOL/L — SIGNIFICANT CHANGE UP (ref 5–17)
BUN SERPL-MCNC: 8 MG/DL — SIGNIFICANT CHANGE UP (ref 7–23)
CALCIUM SERPL-MCNC: 8.7 MG/DL — SIGNIFICANT CHANGE UP (ref 8.5–10.1)
CHLORIDE SERPL-SCNC: 103 MMOL/L — SIGNIFICANT CHANGE UP (ref 96–108)
CO2 SERPL-SCNC: 31 MMOL/L — SIGNIFICANT CHANGE UP (ref 22–31)
CREAT SERPL-MCNC: 0.36 MG/DL — LOW (ref 0.5–1.3)
GLUCOSE BLDC GLUCOMTR-MCNC: 134 MG/DL — HIGH (ref 70–99)
GLUCOSE BLDC GLUCOMTR-MCNC: 138 MG/DL — HIGH (ref 70–99)
GLUCOSE BLDC GLUCOMTR-MCNC: 147 MG/DL — HIGH (ref 70–99)
GLUCOSE SERPL-MCNC: 137 MG/DL — HIGH (ref 70–99)
HCT VFR BLD CALC: 39.1 % — SIGNIFICANT CHANGE UP (ref 34.5–45)
HGB BLD-MCNC: 12.1 G/DL — SIGNIFICANT CHANGE UP (ref 11.5–15.5)
MAGNESIUM SERPL-MCNC: 2.3 MG/DL — SIGNIFICANT CHANGE UP (ref 1.6–2.6)
MCHC RBC-ENTMCNC: 28.2 PG — SIGNIFICANT CHANGE UP (ref 27–34)
MCHC RBC-ENTMCNC: 30.9 GM/DL — LOW (ref 32–36)
MCV RBC AUTO: 91.1 FL — SIGNIFICANT CHANGE UP (ref 80–100)
NRBC # BLD: 0 /100 WBCS — SIGNIFICANT CHANGE UP (ref 0–0)
PHOSPHATE SERPL-MCNC: 3.5 MG/DL — SIGNIFICANT CHANGE UP (ref 2.5–4.5)
PLATELET # BLD AUTO: 249 K/UL — SIGNIFICANT CHANGE UP (ref 150–400)
POTASSIUM SERPL-MCNC: 3.9 MMOL/L — SIGNIFICANT CHANGE UP (ref 3.5–5.3)
POTASSIUM SERPL-SCNC: 3.9 MMOL/L — SIGNIFICANT CHANGE UP (ref 3.5–5.3)
RBC # BLD: 4.29 M/UL — SIGNIFICANT CHANGE UP (ref 3.8–5.2)
RBC # FLD: 14.7 % — HIGH (ref 10.3–14.5)
SODIUM SERPL-SCNC: 141 MMOL/L — SIGNIFICANT CHANGE UP (ref 135–145)
WBC # BLD: 7.5 K/UL — SIGNIFICANT CHANGE UP (ref 3.8–10.5)
WBC # FLD AUTO: 7.5 K/UL — SIGNIFICANT CHANGE UP (ref 3.8–10.5)

## 2019-12-04 PROCEDURE — 99231 SBSQ HOSP IP/OBS SF/LOW 25: CPT

## 2019-12-04 PROCEDURE — 99232 SBSQ HOSP IP/OBS MODERATE 35: CPT

## 2019-12-04 RX ORDER — DIGOXIN 250 MCG
0.25 TABLET ORAL ONCE
Refills: 0 | Status: DISCONTINUED | OUTPATIENT
Start: 2019-12-04 | End: 2019-12-04

## 2019-12-04 RX ORDER — DIGOXIN 250 MCG
0.5 TABLET ORAL ONCE
Refills: 0 | Status: COMPLETED | OUTPATIENT
Start: 2019-12-04 | End: 2019-12-04

## 2019-12-04 RX ORDER — DIGOXIN 250 MCG
0.25 TABLET ORAL ONCE
Refills: 0 | Status: COMPLETED | OUTPATIENT
Start: 2019-12-04 | End: 2019-12-04

## 2019-12-04 RX ORDER — ACETAMINOPHEN 500 MG
1000 TABLET ORAL ONCE
Refills: 0 | Status: COMPLETED | OUTPATIENT
Start: 2019-12-04 | End: 2019-12-04

## 2019-12-04 RX ORDER — DIGOXIN 250 MCG
0.5 TABLET ORAL ONCE
Refills: 0 | Status: DISCONTINUED | OUTPATIENT
Start: 2019-12-04 | End: 2019-12-04

## 2019-12-04 RX ADMIN — Medication 3 MILLILITER(S): at 17:13

## 2019-12-04 RX ADMIN — Medication 100 MILLIGRAM(S): at 22:40

## 2019-12-04 RX ADMIN — Medication 0.25 MILLIGRAM(S): at 18:27

## 2019-12-04 RX ADMIN — Medication 1 APPLICATION(S): at 17:45

## 2019-12-04 RX ADMIN — Medication 100 MILLIGRAM(S): at 14:01

## 2019-12-04 RX ADMIN — CHLORHEXIDINE GLUCONATE 1 APPLICATION(S): 213 SOLUTION TOPICAL at 05:29

## 2019-12-04 RX ADMIN — SODIUM CHLORIDE 3 MILLILITER(S): 9 INJECTION INTRAMUSCULAR; INTRAVENOUS; SUBCUTANEOUS at 00:29

## 2019-12-04 RX ADMIN — SODIUM CHLORIDE 3 MILLILITER(S): 9 INJECTION INTRAMUSCULAR; INTRAVENOUS; SUBCUTANEOUS at 05:29

## 2019-12-04 RX ADMIN — Medication 400 MILLIGRAM(S): at 23:39

## 2019-12-04 RX ADMIN — SODIUM CHLORIDE 3 MILLILITER(S): 9 INJECTION INTRAMUSCULAR; INTRAVENOUS; SUBCUTANEOUS at 11:08

## 2019-12-04 RX ADMIN — Medication 1 APPLICATION(S): at 05:33

## 2019-12-04 RX ADMIN — Medication 100 MILLIGRAM(S): at 05:34

## 2019-12-04 RX ADMIN — MIDODRINE HYDROCHLORIDE 10 MILLIGRAM(S): 2.5 TABLET ORAL at 14:01

## 2019-12-04 RX ADMIN — Medication 3 MILLILITER(S): at 23:51

## 2019-12-04 RX ADMIN — Medication 0.5 MILLIGRAM(S): at 12:02

## 2019-12-04 RX ADMIN — Medication 3 MILLILITER(S): at 11:08

## 2019-12-04 RX ADMIN — Medication 3 MILLILITER(S): at 00:29

## 2019-12-04 RX ADMIN — ENOXAPARIN SODIUM 80 MILLIGRAM(S): 100 INJECTION SUBCUTANEOUS at 18:27

## 2019-12-04 RX ADMIN — ENOXAPARIN SODIUM 80 MILLIGRAM(S): 100 INJECTION SUBCUTANEOUS at 05:32

## 2019-12-04 RX ADMIN — Medication 3 MILLILITER(S): at 05:29

## 2019-12-04 NOTE — PROGRESS NOTE ADULT - SUBJECTIVE AND OBJECTIVE BOX
HPI:  73 yo F bibems for acute resp failure.  Pt. has been reportedly sob and altered for 3 days.  Patient can not give further history due to condition.  Noted to be hypoxic  and blue colored.  No other complaints or inciting event.  No family at bedside now. Disoriented for last few days 4 days, sleeping, coughing with mucous and congestion, can't swallow pills.  Pt. has 24 hour HHA, lives with son. As per son increased secretions last few day, Increased lethargy.  Multiple visits in last month for UTI.  In ER noted to be hypoxic requiring intubation. Received 3 liter IV fluid Lacate 2.3.  ICU called for evaluation and management (25 Nov 2019 16:53)      24 hr events:      ## ROS:  [ ] unable to obtain  CONSTITUTIONAL: No fever, weight loss, or fatigue  EYES: No eye pain, visual disturbances, or discharge  ENMT:  No difficulty hearing, tinnitus, vertigo; No sinus or throat pain  NECK: No pain or stiffness  RESPIRATORY: No cough, wheezing, chills or hemoptysis; No shortness of breath  CARDIOVASCULAR: No chest pain, palpitations, dizziness, or leg swelling  GASTROINTESTINAL: No abdominal or epigastric pain. No nausea, vomiting, or hematemesis; No diarrhea or constipation. No melena or hematochezia.  GENITOURINARY: No dysuria, frequency, hematuria, or incontinence  NEUROLOGICAL: No headaches, memory loss, loss of strength, numbness, or tremors  SKIN: No itching, burning, rashes, or lesions   LYMPH NODES: No enlarged glands  ENDOCRINE: No heat or cold intolerance; No hair loss  MUSCULOSKELETAL: No joint pain or swelling; No muscle, back, or extremity pain  PSYCHIATRIC: No depression, anxiety, mood swings, or difficulty sleeping  HEME/LYMPH: No easy bruising, or bleeding gums  ALLERGY AND IMMUNOLOGIC: No hives or eczema    ## Labs:  CBC:                        12.1   7.50  )-----------( 249      ( 04 Dec 2019 04:22 )             39.1     Chem:  12-04    141  |  103  |  8   ----------------------------<  137<H>  3.9   |  31  |  0.36<L>    Ca    8.7      04 Dec 2019 04:22  Phos  3.5     12-04  Mg     2.3     12-04      Coags:          ## Imaging:    ## Medications:  ceFAZolin   IVPB 2000 milliGRAM(s) IV Intermittent every 8 hours    midodrine 10 milliGRAM(s) Oral every 8 hours    albuterol/ipratropium for Nebulization 3 milliLiter(s) Nebulizer every 6 hours    dextrose 40% Gel 15 Gram(s) Oral once PRN  dextrose 50% Injectable 12.5 Gram(s) IV Push once  dextrose 50% Injectable 25 Gram(s) IV Push once  dextrose 50% Injectable 25 Gram(s) IV Push once  glucagon  Injectable 1 milliGRAM(s) IntraMuscular once PRN  insulin lispro (HumaLOG) corrective regimen sliding scale   SubCutaneous every 6 hours    enoxaparin Injectable 80 milliGRAM(s) SubCutaneous every 12 hours    polyethylene glycol 3350 17 Gram(s) Oral daily    acetaminophen   Tablet .. 650 milliGRAM(s) Oral every 6 hours PRN  fentaNYL    Injectable 50 MICROGram(s) IV Push every 4 hours PRN      ## Vitals:  T(C): 37.1 (12-04-19 @ 16:02), Max: 37.6 (12-04-19 @ 05:35)  HR: 112 (12-04-19 @ 19:15) (95 - 136)  BP: 122/61 (12-04-19 @ 19:00) (100/55 - 138/85)  BP(mean): 77 (12-04-19 @ 19:00) (59 - 99)  RR: 27 (12-04-19 @ 19:15) (11 - 33)  SpO2: 94% (12-04-19 @ 19:15) (92% - 100%)  Wt(kg): --  Vent:   ABG: ABG - ( 03 Dec 2019 12:10 )  pH, Arterial: x     pH, Blood: 7.49  /  pCO2: 40    /  pO2: 73    / HCO3: 31    / Base Excess: 7.0   /  SaO2: 95                    12-03 @ 07:01  -  12-04 @ 07:00  --------------------------------------------------------  IN: 100 mL / OUT: 750 mL / NET: -650 mL    12-04 @ 07:01 - 12-04 @ 19:34  --------------------------------------------------------  IN: 0 mL / OUT: 550 mL / NET: -550 mL          ## P/E:  Gen: lying comfortably in bed in no apparent distress  HEENT: PERRL, EOMI  Resp: CTA B/L no c/r/w  CVS: S1S2 no m/r/g  Abd: soft NT/ND +BS  Ext: no c/c/e  Neuro: A&Ox3    CENTRAL LINE: [ ] YES [ ] NO  LOCATION:   DATE INSERTED:  REMOVE: [ ] YES [ ] NO      BAUDILIO: [ ] YES [ ] NO    DATE INSERTED:  REMOVE:  [ ] YES [ ] NO      A-LINE:  [ ] YES [ ] NO  LOCATION:   DATE INSERTED:  REMOVE:  [ ] YES [ ] NO  EXPLAIN:    GLOBAL ISSUE/BEST PRACTICE:  Analgesia:  Sedation:  HOB elevation: yes  Stress ulcer prophylaxis:  VTE prophylaxis:  Oral Care:  Glycemic control:  Nutrition:    CODE STATUS: [ ] full code  [ ] DNR  [ ] DNI  [ ] [facility]  Goals of care discussion: [ ] yes HPI:  74F PMH obesity, HTN, a-fib, DM, ?COPD, depression, dementia, recurrent UTI with progressive decline in mental status and ability to ambulate now bedbound for past 6-8 weeks presents from home with sob and altered mental status/increased lethargy for 3-4 days with cough and mucous production with congestion and inability to swallow pills.  Pt. has 24 hour HHA, lives with son. Pt has been on chronic benzo's for years and recently has had xanax changed to valium with family noted increased lethargy with valium which they have been self taper and adjusting for patient at home. In ER pt noted to be hypoxic and cyanotic requiring intubation. Received 3 liter IV fluid Lacate 2.3.  ICU called for evaluation and management       24 hr events:  no acute events overnight  HR remains slightly elevated in the one teens a-fib on monitor  failed swallow eval yesterday and today - pt keeping food in mouth and not swallowing  more awake today but still slow to respond  follows commands and answers yes/no questions at times    ## ROS:  [x] limited due to overall mental status/lethargy  denies SOB  denies CP  denies abdominal pain    ## Labs:  CBC:                        12.1   7.50  )-----------( 249      ( 04 Dec 2019 04:22 )             39.1     Chem:  12-04    141  |  103  |  8   ----------------------------<  137<H>  3.9   |  31  |  0.36<L>    Ca    8.7      04 Dec 2019 04:22  Phos  3.5     12-04  Mg     2.3     12-04    Culture - Sputum . (11.26.19 @ 18:44)    Specimen Source: .Sputum Sputum    Culture Results: Moderate Staphylococcus aureus      ## Medications:  ceFAZolin   IVPB 2000 milliGRAM(s) IV Intermittent every 8 hours    midodrine 10 milliGRAM(s) Oral every 8 hours    albuterol/ipratropium for Nebulization 3 milliLiter(s) Nebulizer every 6 hours    dextrose 40% Gel 15 Gram(s) Oral once PRN  dextrose 50% Injectable 12.5 Gram(s) IV Push once  dextrose 50% Injectable 25 Gram(s) IV Push once  dextrose 50% Injectable 25 Gram(s) IV Push once  glucagon  Injectable 1 milliGRAM(s) IntraMuscular once PRN  insulin lispro (HumaLOG) corrective regimen sliding scale   SubCutaneous every 6 hours    enoxaparin Injectable 80 milliGRAM(s) SubCutaneous every 12 hours    polyethylene glycol 3350 17 Gram(s) Oral daily    acetaminophen   Tablet .. 650 milliGRAM(s) Oral every 6 hours PRN  fentaNYL    Injectable 50 MICROGram(s) IV Push every 4 hours PRN      ## Vitals:  T(C): 37.1 (12-04-19 @ 16:02), Max: 37.6 (12-04-19 @ 05:35)  HR: 112 (12-04-19 @ 19:15) (95 - 136)  BP: 122/61 (12-04-19 @ 19:00) (100/55 - 138/85)  BP(mean): 77 (12-04-19 @ 19:00) (59 - 99)  RR: 27 (12-04-19 @ 19:15) (11 - 33)  SpO2: 94% (12-04-19 @ 19:15) (92% - 100%)    ABG: ABG - ( 03 Dec 2019 12:10 )  pH, Arterial: x     pH, Blood: 7.49  /  pCO2: 40    /  pO2: 73    / HCO3: 31    / Base Excess: 7.0   /  SaO2: 95                    12-03 @ 07:01  -  12-04 @ 07:00  --------------------------------------------------------  IN: 100 mL / OUT: 750 mL / NET: -650 mL    12-04 @ 07:01  -  12-04 @ 19:34  --------------------------------------------------------  IN: 0 mL / OUT: 550 mL / NET: -550 mL          ## P/E:  Gen: lying comfortably in bed in no apparent distress  HEENT: sclera white  Resp: CTA B/L , no wheeze, no rhonchi  CVS: irregularly irregular  Abd: soft NT/ND +BS  Ext: no c/c/e  Neuro: lethargic overall but more awake today opening eyes, moves all extremities, follows commands, slow to verbally respond but at times answering yes/no questions    CENTRAL LINE: [ ] YES [x] NO  LOCATION:   DATE INSERTED:  REMOVE: [ ] YES [ ] NO      ASTUDILLO: [ ] YES [x] NO    DATE INSERTED:  REMOVE:  [ ] YES [ ] NO      A-LINE:  [ ] YES [x] NO  LOCATION:   DATE INSERTED:  REMOVE:  [ ] YES [ ] NO  EXPLAIN:    GLOBAL ISSUE/BEST PRACTICE:  Analgesia: fentanyl  Sedation: n/a  HOB elevation: yes  Stress ulcer prophylaxis: n/a  VTE prophylaxis: on full dose lovenx for a-fib  Oral Care: n/a  Glycemic control: sliding scale coverage  Nutrition: npo    CODE STATUS: [x] full code  [ ] DNR  [ ] DNI  [ ] MOLST  Goals of care discussion: [x] yes

## 2019-12-04 NOTE — PROGRESS NOTE BEHAVIORAL HEALTH - NSBHFUPINTERVALHXFT_PSY_A_CORE
Patient is better as per daughter at bedside and actually asked for toast and has moments of being more awake. Same presentation otherwise -  She does not engage in any meaningful manner; limited eye contact and does not speak as of now. No psychomotor agitation observed - Patient looks comfortable

## 2019-12-04 NOTE — SWALLOW BEDSIDE ASSESSMENT ADULT - SWALLOW EVAL: DIAGNOSIS
pt presented with reduced cognition therefore limiting eval, however oropharyngeal phases of swallowing marked by decreased labial seal on spoon causing anterior loss of bolus, slow oral transport, reduced a-p transport, suspect delay in swallow trigger with reduced laryngeal elevation, throat clear/cough post honey thick liquids, and multiple swallows. pt required verbal cues to swallow bolus. suggest pt not safe for PO diet at this time

## 2019-12-04 NOTE — CHART NOTE - NSCHARTNOTEFT_GEN_A_CORE
Mrs. Ortega is a 73 yo female with PMH dementia, depression, long standing benzo use, afib, HTN, COPD, DM who presented to [facility] with lethargy, AMS, dyspnea and cough on 11/25. She was intubated for acute hypoxic respiratory failure and was found to have pna, uti, and septic shock. Initially during her ICU course she was vasopressor dependant, however was successfully weaned off with use of midodrine. She was extubated on 12/2 and has been without respiratory distress, hypoxia or co2 retention with bipap prn at night which she has not consistently required. There was concern for benzodiazepine withdrawal, however, psych has been consulted and is ongoing eval with plan to continue to hold all of her home psych meds pending neuro improvements as she remains lethargic at times. Today she has remained alert for most of the day. She has failed speech and swallow the past 2 days, with passing bedside nursing eval today and refusing replacement of NGT for feeds. Speech and swallow to see her again tomorrow morning 12/5. ICU stay has been complicated by persistent afib w rvr, which today pt has been dig loaded for with level for AM ordered. Pulmonary consult has been placed for patient transfer to telemetry. Patient has been seen/examined by ICU attending and is deemed stable for transfer to telemetry. Discussed above with hospitalist Dr. Mills. Pt to transfer to their service.

## 2019-12-04 NOTE — SWALLOW BEDSIDE ASSESSMENT ADULT - PHARYNGEAL PHASE
Decreased laryngeal elevation/Multiple swallows/Delayed pharyngeal swallow Multiple swallows/required verbal prompt to swallow/Decreased laryngeal elevation/Delayed pharyngeal swallow/Throat clear post oral intake

## 2019-12-04 NOTE — SWALLOW BEDSIDE ASSESSMENT ADULT - SLP PERTINENT HISTORY OF CURRENT PROBLEM
swallow eval completed 12/3/2019 at which time NPO was recommended. see report for details swallow eval completed 12/3/2019 at which time NPO was recommended. see report for details. RN requested swallow eval 2/2 pt with improved alertness.

## 2019-12-04 NOTE — PROGRESS NOTE BEHAVIORAL HEALTH - NSBHCHARTREVIEWLAB_PSY_A_CORE FT
12-04    141  |  103  |  8   ----------------------------<  137<H>  3.9   |  31  |  0.36<L>    Ca    8.7      04 Dec 2019 04:22  Phos  3.5     12-04  Mg     2.3     12-04

## 2019-12-04 NOTE — SWALLOW BEDSIDE ASSESSMENT ADULT - SLP GENERAL OBSERVATIONS
pt was seen bedside ? oriented to self. pt inconsistently followed directions with max prompts from SLP and inconsistently responded to autobiographical/orientation questions except "too old" when asked her age. pt nonverbal except for 2-3 utterances such as "yes/no" and "i'm ok" and essentially noncommunicative except  for head nod/shake. noted pt did not visually locate to SLP

## 2019-12-04 NOTE — PROGRESS NOTE ADULT - ASSESSMENT
74F PMH obesity, HTN, a-fib, DM, ?COPD, depression, dementia, recurrent UTI with progressive decline in mental status and ability to ambulate now bedbound for past 6-8 weeks presents from home with sob and altered mental status/increased lethargy for 3-4 days with cough and mucous production with congestion and inability to swallow pills.  Admitted for acute hypoxic and hypercarbic respiratory failure requiring intubation, sepsis with septic shock, staph PNA, acute metabolic encephalopathy, a-fib RVR.     1. NEURO  - more awake at times but still lethargic  - observing off all psych meds and sedatives due to lethargy  - pt seems to be more responsive when children at bedside  - pt with underlying dementia with ?behavioral disturbance now perhaps with some hypoactive delirium  - appreciate psych input and follow up    2. CV  - out of shock state off IV pressor  - BP stable on midodrine  - a-fib RVR; will try loading with digoxin and then check digoxin level post load  - on full dose lovenox for a-fib anticoagulation    3. PULM  - stable pulmonary status  - extubated on nasal cannula doing well  - pt has not required use of BiPAP   - cont duonebs  - off hypertonic saline nebs, secretions improved  - on cefazolin for staph PNA to complete 7day course of antibx    4. ENDO  - cont sliding scale coverage for DM  - appreciate podiatry foot care for diabetic    5. GI  - pt failed swallow as she was holding food in mouth, however later in day pt swallowed when prompted by family  - will have speech and swallow re-eval tomorrow with family present, otherwise if fails again will need NGT    6. GEN  - pt remains stable for transfer to tele  - discussed with family  - physical therapy

## 2019-12-04 NOTE — CONSULT NOTE ADULT - SUBJECTIVE AND OBJECTIVE BOX
Patient is a 74y old  Female who presents with a chief complaint of hypoxia with acute respiratory failure (03 Dec 2019 17:00)    HPI: 74 year female with HTN, Recently diagnosed DM, Chronic A Fib, TIA/ CVA, Unsteady gait with frequent falls, suspected COPD and dementia.  50 pack year history of smoking, quit 2 years ago. Has been on as needed bronchodilators.  Son reports that she was feeling more than normal cold for about a week, then developed sob along with cough the night before her presentation. Brought in with acute Respiratory distress and altered mental status.  Got intubated in ED for acute hypoxic Respiratory failure secondary to pneumonia.  Got successfully extubated, now on nasal O2.    PAST MEDICAL & SURGICAL HISTORY:  Recurrent UTI  Dementia, senile with depression  COPD with respiratory failure, acute  Dementia  HTN (hypertension)  Afib  DM (diabetes mellitus)    FAMILY HISTORY: not able to report,    SOCIAL HISTORY: 50 pack year smoking, quit 2 years ago.    Allergies  No Known Allergies    MEDICATIONS  (STANDING):  albuterol/ipratropium for Nebulization 3 milliLiter(s) Nebulizer every 6 hours  ceFAZolin   IVPB 2000 milliGRAM(s) IV Intermittent every 8 hours  chlorhexidine 4% Liquid 1 Application(s) Topical <User Schedule>  dextrose 5%. 1000 milliLiter(s) (50 mL/Hr) IV Continuous <Continuous>  dextrose 50% Injectable 12.5 Gram(s) IV Push once  dextrose 50% Injectable 25 Gram(s) IV Push once  dextrose 50% Injectable 25 Gram(s) IV Push once  enoxaparin Injectable 80 milliGRAM(s) SubCutaneous every 12 hours  insulin lispro (HumaLOG) corrective regimen sliding scale   SubCutaneous every 6 hours  midodrine 10 milliGRAM(s) Oral every 8 hours  polyethylene glycol 3350 17 Gram(s) Oral daily    MEDICATIONS  (PRN):  acetaminophen   Tablet .. 650 milliGRAM(s) Oral every 6 hours PRN Temp greater or equal to 38C (100.4F)  dextrose 40% Gel 15 Gram(s) Oral once PRN Blood Glucose LESS THAN 70 milliGRAM(s)/deciliter  fentaNYL    Injectable 50 MICROGram(s) IV Push every 4 hours PRN Moderate Pain (4 - 6)  glucagon  Injectable 1 milliGRAM(s) IntraMuscular once PRN Glucose LESS THAN 70 milligrams/deciliter    REVIEW OF SYSTEMS:  not able to provide.    MACRA & MIPS:  Vaccines - Influenza: yes and Pneumovax: yes  Tobacco: ex smoker  Blood Pressure Screening / Control of: 138/85  Current Medications Reviewed: yes    Vital Signs Last 24 Hrs  T(C): 37.1 (04 Dec 2019 16:02), Max: 37.6 (04 Dec 2019 05:35)  T(F): 98.7 (04 Dec 2019 16:02), Max: 99.7 (04 Dec 2019 12:00)  HR: 105 (04 Dec 2019 18:00) (95 - 136)  BP: 122/56 (04 Dec 2019 18:00) (100/55 - 138/85)  BP(mean): 69 (04 Dec 2019 18:00) (59 - 99)  RR: 30 (04 Dec 2019 18:00) (11 - 33)  SpO2: 96% (04 Dec 2019 18:00) (92% - 100%)    PHYSICAL EXAM:  GEN:         Awake, comfortable.  HEENT:     Normal.    RESP:        decreased air entry  CVS:          irregular rate and rhythm.   ABD:         Soft, non-tender, non-distended;   SKIN:           Warm and dry.  EXTR:            No clubbing, cyanosis or edema  CNS:             not able to cooperate  PSYCH:          Dementia.    LABS:                        12.1   7.50  )-----------( 249      ( 04 Dec 2019 04:22 )             39.1     12-04    141  |  103  |  8   ----------------------------<  137<H>  3.9   |  31  |  0.36<L>    Ca    8.7      04 Dec 2019 04:22  Phos  3.5     12-04  Mg     2.3     12-04 12-03 @ 12:10  pH: 7.49  pCO2: 40  pO2: 73  SaO2: 95  12-02 @ 14:53  pH: 7.49  pCO2: 42  pO2: 79  SaO2: 96    Culture - Sputum (collected 11-26-19 @ 18:44)  Source: .Sputum Sputum  Gram Stain (11-28-19 @ 17:07):    Moderate polymorphonuclear leukocytes per low power field    Rare Squamous epithelial cells per low power field    Few Gram positive cocci in pairs per oil power field    Few Yeast like cells per oil power field  Final Report (11-28-19 @ 17:07):    Moderate Staphylococcus aureus    Normal Respiratory Lisa present  Organism: Staphylococcus aureus (11-28-19 @ 17:07)  Organism: Staphylococcus aureus (11-28-19 @ 17:07)      -  Ampicillin/Sulbactam: S <=8/4      -  Cefazolin: S <=4      -  Clindamycin: S 0.5      -  Erythromycin: S <=0.25      -  Gentamicin: S <=1 Should not be used as monotherapy      -  Oxacillin: S 0.5      -  Penicillin: R >8      -  RIF- Rifampin: I 2 Should not be used as monotherapy      -  Tetra/Doxy: S <=1      -  Trimethoprim/Sulfamethoxazole: S <=0.5/9.5      -  Vancomycin: S 2      Method Type: SAUL    Culture - Blood (collected 11-25-19 @ 18:16)  Source: .Blood Blood  Final Report (11-30-19 @ 19:00):    No growth at 5 days.    Culture - Urine (collected 11-25-19 @ 18:14)  Source: .Urine Clean Catch (Midstream)  Final Report (11-27-19 @ 23:38):    10,000 - 49,000 CFU/mL Yeast-like cells, presumptively not Candida    albicans "Susceptibilities not performed"    Culture - Blood (collected 11-25-19 @ 17:58)  Source: .Blood Blood  Final Report (11-30-19 @ 18:00):    No growth at 5 days.    EKG: A Fib    RADIOLOGY & ADDITIONAL STUDIES:  < from: CT Angio Chest w/ IV Cont (11.25.19 @ 16:20) >  EXAM:  CT ABDOMEN AND PELVIS IC                          EXAM:  CT ANGIO CHEST (W)AW IC                          PROCEDURE DATE:  11/25/2019      INTERPRETATION:  CLINICAL INFORMATION: Shortness of breath respiratory   failure and altered mental status.    COMPARISON: None.    PROCEDURE:   CT Angiography of the Chest was performed followed by portal venous phase   imaging of the Abdomen and Pelvis.  IV Contrast: 90 ml of Omnipaque 350 was injected intravenously. 10 ml   were discarded.  Oral contrast: None.  Sagittal and coronal reformats were performed as well as 3D (MIP)   reconstructions.    FINDINGS:    CHEST:     LUNGS AND LARGE AIRWAYS: PLEURA: Endotracheal tube, tip above the level   of the marni.  Mucous plugging with collapse of left lower lobe and segmental lower lobe   bronchi. Left lower lobe airspace consolidation may reflect atelectasis   and/or pneumonia in the appropriate clinical setting.  Right lower lobe peribronchial thickening with poorly defined   centrilobular nodular opacities, may reflect infectious/inflammatory   small airway disease.  Atelectatic changes at the right lung base.  No lobar lung consolidation noted.    No significant pleural effusion.  No pneumothorax.    VESSELS: The evaluation of some of the segmental and subsegmental   pulmonary arterial branches is somewhat limited secondary to artifact and   incomplete opacification by contrast; otherwise no acute pulmonary   embolism is noted.  Atherosclerotic calcification of the thoracic aorta.  Coronary artery calcifications.    HEART: Heart size is normal. No pericardial effusion.  MEDIASTINUM AND BALA: Small shotty pretracheal/subcarinal mediastinal   lymphadenopathy.  CHEST WALL AND LOWER NECK: Within normal limits.    ABDOMEN AND PELVIS:  Artifact related to patient's arms degrades image quality limiting   evaluation.    LIVER: Within normal limits.  BILE DUCTS: Normal caliber.  GALLBLADDER: Within normal limits.  SPLEEN: Within normal limits.  Perisplenic varices.  PANCREAS: Fatty replacement of the head and uncinate process.  ADRENALS: Within normal limits.  KIDNEYS/URETERS:   No hydroureteronephrosis.  Small, subcentimeter indeterminate hypodense lesion interpolar left   kidney.    BLADDER: Balloon Uriarte catheter within a nondistended urinary bladder.  REPRODUCTIVE ORGANS: Prior hysterectomy.    BOWEL: Evaluation of the stomach and gastrointestinal tract is limited   without distention. Scattered intraluminal densities duodenal bulb and   colon, may reflect ingested material.  Small bowel loops clustered in the left abdomen, lateral to the colon;   may represent malrotation or internal hernia; no bowel obstruction noted.   Appendix   Not adequately visualized on this exam. No secondary signs of   inflammation.  PERITONEUM: No ascites.  No localized intra-abdominal fluid collection or pneumoperitoneum.  VESSELS: Atherosclerotic calcification of the abdominal aorta, which is   normal in caliber.  RETROPERITONEUM/LYMPH NODES: No lymphadenopathy.    ABDOMINAL WALL: Within normal limits.  BONES:   Multilevel degenerative changes of the spine.  Prominent degenerative changes of the bilateral shoulder joints.    IMPRESSION:     No CT evidence for acute pulmonary embolism as discussed.    Left lower lobe airspace consolidation which may reflect atelectasis   and/or pneumonia in the proper clinical setting.    No acute intra-abdominal pathology demonstrated as discussed.    Other findings as discussed above.    MARY TRIPATHI M.D., ATTENDING RADIOLOGIST  This document has been electronically signed. Nov 25 2019  5:12PM      ASSESSMENT AND PLAN:  ·	S/P Acute hypoxic Respiratory failure  ·	Left lower lobe pneumonia.  ·	Staph Aureus in Sputum  ·	Acute metabolic Encephalopathy.  ·	Chronic Atrial Fibrillation.  ·	Suspect COPD.  ·	HTN by history.  ·	DM by history.  ·	Frequent falls from unsteady gait.  ·	Dysphagia.    Continue O2, nebulizer and antibiotics.  Aspiration precautions.  BP and DM control.  On full dose Lovenox for A Fib.  Discussed with son at bed side.

## 2019-12-04 NOTE — PROGRESS NOTE BEHAVIORAL HEALTH - NSBHCHARTREVIEWVS_PSY_A_CORE FT
T(C): 37.6 (12-04-19 @ 12:00), Max: 37.6 (12-04-19 @ 05:35)  HR: 97 (12-04-19 @ 15:00) (96 - 136)  BP: 107/47 (12-04-19 @ 15:00) (90/44 - 138/85)  RR: 27 (12-04-19 @ 15:00) (11 - 33)  SpO2: 97% (12-04-19 @ 15:00) (92% - 100%)

## 2019-12-05 LAB
ANION GAP SERPL CALC-SCNC: 8 MMOL/L — SIGNIFICANT CHANGE UP (ref 5–17)
BUN SERPL-MCNC: 9 MG/DL — SIGNIFICANT CHANGE UP (ref 7–23)
CALCIUM SERPL-MCNC: 8.9 MG/DL — SIGNIFICANT CHANGE UP (ref 8.5–10.1)
CHLORIDE SERPL-SCNC: 103 MMOL/L — SIGNIFICANT CHANGE UP (ref 96–108)
CO2 SERPL-SCNC: 27 MMOL/L — SIGNIFICANT CHANGE UP (ref 22–31)
CREAT SERPL-MCNC: 0.3 MG/DL — LOW (ref 0.5–1.3)
DIGOXIN SERPL-MCNC: 0.96 NG/ML — SIGNIFICANT CHANGE UP (ref 0.8–2)
GLUCOSE BLDC GLUCOMTR-MCNC: 129 MG/DL — HIGH (ref 70–99)
GLUCOSE BLDC GLUCOMTR-MCNC: 135 MG/DL — HIGH (ref 70–99)
GLUCOSE BLDC GLUCOMTR-MCNC: 142 MG/DL — HIGH (ref 70–99)
GLUCOSE BLDC GLUCOMTR-MCNC: 142 MG/DL — HIGH (ref 70–99)
GLUCOSE BLDC GLUCOMTR-MCNC: 150 MG/DL — HIGH (ref 70–99)
GLUCOSE SERPL-MCNC: 134 MG/DL — HIGH (ref 70–99)
HCT VFR BLD CALC: 41.2 % — SIGNIFICANT CHANGE UP (ref 34.5–45)
HGB BLD-MCNC: 12.5 G/DL — SIGNIFICANT CHANGE UP (ref 11.5–15.5)
MAGNESIUM SERPL-MCNC: 2.2 MG/DL — SIGNIFICANT CHANGE UP (ref 1.6–2.6)
MCHC RBC-ENTMCNC: 27.7 PG — SIGNIFICANT CHANGE UP (ref 27–34)
MCHC RBC-ENTMCNC: 30.3 GM/DL — LOW (ref 32–36)
MCV RBC AUTO: 91.4 FL — SIGNIFICANT CHANGE UP (ref 80–100)
NRBC # BLD: 0 /100 WBCS — SIGNIFICANT CHANGE UP (ref 0–0)
PHOSPHATE SERPL-MCNC: 3.6 MG/DL — SIGNIFICANT CHANGE UP (ref 2.5–4.5)
PLATELET # BLD AUTO: 291 K/UL — SIGNIFICANT CHANGE UP (ref 150–400)
POTASSIUM SERPL-MCNC: 4 MMOL/L — SIGNIFICANT CHANGE UP (ref 3.5–5.3)
POTASSIUM SERPL-SCNC: 4 MMOL/L — SIGNIFICANT CHANGE UP (ref 3.5–5.3)
RBC # BLD: 4.51 M/UL — SIGNIFICANT CHANGE UP (ref 3.8–5.2)
RBC # FLD: 14.6 % — HIGH (ref 10.3–14.5)
SODIUM SERPL-SCNC: 138 MMOL/L — SIGNIFICANT CHANGE UP (ref 135–145)
WBC # BLD: 9.07 K/UL — SIGNIFICANT CHANGE UP (ref 3.8–10.5)
WBC # FLD AUTO: 9.07 K/UL — SIGNIFICANT CHANGE UP (ref 3.8–10.5)

## 2019-12-05 PROCEDURE — 99232 SBSQ HOSP IP/OBS MODERATE 35: CPT

## 2019-12-05 PROCEDURE — 99231 SBSQ HOSP IP/OBS SF/LOW 25: CPT

## 2019-12-05 RX ORDER — ACETAMINOPHEN 500 MG
1000 TABLET ORAL EVERY 8 HOURS
Refills: 0 | Status: COMPLETED | OUTPATIENT
Start: 2019-12-05 | End: 2019-12-07

## 2019-12-05 RX ORDER — SODIUM CHLORIDE 9 MG/ML
1000 INJECTION INTRAMUSCULAR; INTRAVENOUS; SUBCUTANEOUS
Refills: 0 | Status: COMPLETED | OUTPATIENT
Start: 2019-12-05 | End: 2019-12-05

## 2019-12-05 RX ADMIN — Medication 1000 MILLIGRAM(S): at 00:10

## 2019-12-05 RX ADMIN — ENOXAPARIN SODIUM 80 MILLIGRAM(S): 100 INJECTION SUBCUTANEOUS at 06:24

## 2019-12-05 RX ADMIN — ENOXAPARIN SODIUM 80 MILLIGRAM(S): 100 INJECTION SUBCUTANEOUS at 17:38

## 2019-12-05 RX ADMIN — Medication 100 MILLIGRAM(S): at 13:04

## 2019-12-05 RX ADMIN — SODIUM CHLORIDE 60 MILLILITER(S): 9 INJECTION INTRAMUSCULAR; INTRAVENOUS; SUBCUTANEOUS at 21:11

## 2019-12-05 RX ADMIN — Medication 3 MILLILITER(S): at 05:21

## 2019-12-05 RX ADMIN — SODIUM CHLORIDE 60 MILLILITER(S): 9 INJECTION INTRAMUSCULAR; INTRAVENOUS; SUBCUTANEOUS at 12:49

## 2019-12-05 RX ADMIN — Medication 100 MILLIGRAM(S): at 21:11

## 2019-12-05 RX ADMIN — Medication 3 MILLILITER(S): at 17:28

## 2019-12-05 RX ADMIN — Medication 3 MILLILITER(S): at 11:38

## 2019-12-05 RX ADMIN — Medication 100 MILLIGRAM(S): at 06:24

## 2019-12-05 NOTE — PROGRESS NOTE ADULT - SUBJECTIVE AND OBJECTIVE BOX
INTERVAL HPI:  74 year female with HTN, Recently diagnosed DM, Chronic A Fib, TIA/ CVA, Unsteady gait with frequent falls, suspected COPD and dementia.  50 pack year history of smoking, quit 2 years ago. Has been on as needed bronchodilators.  Son reports that she was feeling more than normal cold for about a week, then developed sob along with cough the night before her presentation. Brought in with acute Respiratory distress and altered mental status.  Got intubated in ED for acute hypoxic Respiratory failure secondary to pneumonia.  Got successfully extubated, now on nasal O2.  12/04/19: Out of ICU.    OVERNIGHT EVENTS:  Awake, responsive but weak.    Vital Signs Last 24 Hrs  T(C): 37 (05 Dec 2019 05:12), Max: 37.6 (04 Dec 2019 12:00)  T(F): 98.6 (05 Dec 2019 05:12), Max: 99.7 (04 Dec 2019 12:00)  HR: 106 (05 Dec 2019 09:04) (95 - 117)  BP: 116/57 (05 Dec 2019 05:12) (106/55 - 145/69)  BP(mean): 66 (04 Dec 2019 21:00) (60 - 77)  RR: 18 (05 Dec 2019 05:12) (11 - 32)  SpO2: 92% (05 Dec 2019 09:04) (92% - 97%)    PHYSICAL EXAM:  GEN:        responsive and comfortable.  HEENT:    Normal.    RESP:       no wheezing.  CVS:           Regular rate and rhythm.   ABD:         Soft, non-tender, non-distended;     MEDICATIONS  (STANDING):  albuterol/ipratropium for Nebulization 3 milliLiter(s) Nebulizer every 6 hours  ceFAZolin   IVPB 2000 milliGRAM(s) IV Intermittent every 8 hours  dextrose 5%. 1000 milliLiter(s) (50 mL/Hr) IV Continuous <Continuous>  dextrose 50% Injectable 12.5 Gram(s) IV Push once  dextrose 50% Injectable 25 Gram(s) IV Push once  dextrose 50% Injectable 25 Gram(s) IV Push once  enoxaparin Injectable 80 milliGRAM(s) SubCutaneous every 12 hours  insulin lispro (HumaLOG) corrective regimen sliding scale   SubCutaneous every 6 hours  midodrine 10 milliGRAM(s) Oral every 8 hours  polyethylene glycol 3350 17 Gram(s) Oral daily    MEDICATIONS  (PRN):  acetaminophen   Tablet .. 650 milliGRAM(s) Oral every 6 hours PRN Temp greater or equal to 38C (100.4F)  dextrose 40% Gel 15 Gram(s) Oral once PRN Blood Glucose LESS THAN 70 milliGRAM(s)/deciliter  fentaNYL    Injectable 50 MICROGram(s) IV Push every 4 hours PRN Moderate Pain (4 - 6)  glucagon  Injectable 1 milliGRAM(s) IntraMuscular once PRN Glucose LESS THAN 70 milligrams/deciliter    LABS:                        12.5   9.07  )-----------( 291      ( 05 Dec 2019 06:24 )             41.2     12-05    138  |  103  |  9   ----------------------------<  134<H>  4.0   |  27  |  0.30<L>    Ca    8.9      05 Dec 2019 06:24  Phos  3.6     12-05  Mg     2.2     12-05    12-03 @ 12:10  pH: 7.49  pCO2: 40  pO2: 73  SaO2: 95  12-02 @ 14:53  pH: 7.49  pCO2: 42  pO2: 79  SaO2: 96    ASSESSMENT AND PLAN:  ·	S/P Acute hypoxic Respiratory failure  ·	Left lower lobe pneumonia.  ·	Staph Aureus in Sputum  ·	Acute metabolic Encephalopathy.  ·	Chronic Atrial Fibrillation.  ·	Suspect COPD.  ·	HTN by history.  ·	DM by history.  ·	Frequent falls from unsteady gait.  ·	Dysphagia.    Continue O2, nebulizer and antibiotics.  Aspiration precautions.  BP and DM control.  On full dose Lovenox for A Fib.  Discussed with daughter at bed side.  For repeat swallow evaluation.

## 2019-12-05 NOTE — PROGRESS NOTE ADULT - SUBJECTIVE AND OBJECTIVE BOX
Patient is a 74y old  Female who presents with a chief complaint of hypoxia with acute respiratory failure (05 Dec 2019 11:00)       OVERNIGHT EVENTS:    MEDICATIONS  (STANDING):  albuterol/ipratropium for Nebulization 3 milliLiter(s) Nebulizer every 6 hours  ceFAZolin   IVPB 2000 milliGRAM(s) IV Intermittent every 8 hours  dextrose 5%. 1000 milliLiter(s) (50 mL/Hr) IV Continuous <Continuous>  dextrose 50% Injectable 12.5 Gram(s) IV Push once  dextrose 50% Injectable 25 Gram(s) IV Push once  dextrose 50% Injectable 25 Gram(s) IV Push once  enoxaparin Injectable 80 milliGRAM(s) SubCutaneous every 12 hours  insulin lispro (HumaLOG) corrective regimen sliding scale   SubCutaneous every 6 hours  midodrine 10 milliGRAM(s) Oral every 8 hours  polyethylene glycol 3350 17 Gram(s) Oral daily    MEDICATIONS  (PRN):  acetaminophen   Tablet .. 650 milliGRAM(s) Oral every 6 hours PRN Temp greater or equal to 38C (100.4F)  dextrose 40% Gel 15 Gram(s) Oral once PRN Blood Glucose LESS THAN 70 milliGRAM(s)/deciliter  fentaNYL    Injectable 50 MICROGram(s) IV Push every 4 hours PRN Moderate Pain (4 - 6)  glucagon  Injectable 1 milliGRAM(s) IntraMuscular once PRN Glucose LESS THAN 70 milligrams/deciliter        REVIEW OF SYSTEMS:  - unable to obtain, as patient is sleepy  spoke with son at bedside     Vital Signs Last 24 Hrs  T(C): 37 (05 Dec 2019 05:12), Max: 37.6 (04 Dec 2019 12:00)  T(F): 98.6 (05 Dec 2019 05:12), Max: 99.7 (04 Dec 2019 12:00)  HR: 106 (05 Dec 2019 09:04) (95 - 117)  BP: 116/57 (05 Dec 2019 05:12) (106/55 - 145/69)  BP(mean): 66 (04 Dec 2019 21:00) (60 - 77)  RR: 18 (05 Dec 2019 05:12) (11 - 32)  SpO2: 92% (05 Dec 2019 09:04) (92% - 97%)    PHYSICAL EXAM:  GENERAL: NAD, well-groomed, well-developed  HEAD:  Atraumatic, Normocephalic  EYES:   conjunctiva and sclera clear  ENMT:  Moist mucous membranes   NECK: Supple, No JVD   NERVOUS SYSTEM:  Alert & awake, but sleepy,    CHEST/LUNG: Fair air entry bilaterally, no rales  HEART: Regular rate and rhythm; No murmurs, rubs, or gallops  ABDOMEN: Soft, Nontender, Nondistended; Bowel sounds present  EXTREMITIES:  2+ Peripheral Pulses, No clubbing, cyanosis, or edema  LYMPH: No lymphadenopathy noted  SKIN: No petechiae    LABS:                        12.5   9.07  )-----------( 291      ( 05 Dec 2019 06:24 )             41.2     12-05    138  |  103  |  9   ----------------------------<  134<H>  4.0   |  27  |  0.30<L>    Ca    8.9      05 Dec 2019 06:24  Phos  3.6     12-05  Mg     2.2     12-05         cardiac markers     CAPILLARY BLOOD GLUCOSE      POCT Blood Glucose.: 129 mg/dL (05 Dec 2019 06:20)  POCT Blood Glucose.: 150 mg/dL (05 Dec 2019 00:59)  POCT Blood Glucose.: 138 mg/dL (04 Dec 2019 17:28)  POCT Blood Glucose.: 147 mg/dL (04 Dec 2019 11:55)    Cultures    RADIOLOGY & ADDITIONAL TESTS:    Imaging Personally Reviewed:  [ ] YES  [ ] NO    Consultant(s) Notes Reviewed:  [ x] YES  [ ] NO    Care Discussed with Consultants/Other Providers [ ] YES  [ ] NO

## 2019-12-05 NOTE — PROGRESS NOTE BEHAVIORAL HEALTH - NSBHCHARTREVIEWVS_PSY_A_CORE FT
T(C): 37.3 (12-05-19 @ 11:30), Max: 37.3 (12-05-19 @ 11:30)  HR: 102 (12-05-19 @ 11:45) (95 - 112)  BP: 117/53 (12-05-19 @ 11:30) (107/47 - 145/69)  RR: 18 (12-05-19 @ 11:30) (11 - 32)  SpO2: 93% (12-05-19 @ 11:45) (92% - 97%)

## 2019-12-05 NOTE — PROGRESS NOTE BEHAVIORAL HEALTH - NSBHFUPINTERVALHXFT_PSY_A_CORE
Patient more sedated, quiet today. Does not make eye contact or engage in any meaningful manner. Daughter at bedside.

## 2019-12-05 NOTE — PROGRESS NOTE ADULT - ASSESSMENT
73 yo female with PMH dementia, depression, long standing benzo use, afib, HTN, COPD, DM who presented to [facility] with lethargy, AMS, dyspnea and cough on 11/25. She was intubated for acute hypoxic respiratory failure and was found to have pna, uti, and septic shock. Initially during her ICU course she was vasopressor dependant, however was successfully weaned off with use of midodrine. She was extubated on 12/2 and has been without respiratory distress, hypoxia or co2 retention with bipap prn at night which she has not consistently required. There was concern for benzodiazepine withdrawal, however, psych has been consulted and is ongoing eval with plan to continue to hold all of her home psych meds pending neuro improvements as she remains lethargic at times. Today she has remained alert for most of the day. She has failed speech and swallow the past 2 days, with passing bedside nursing eval today and refusing replacement of NGT for feeds. Speech and swallow to see her again tomorrow morning 12/5. ICU stay has been complicated by persistent afib w rvr, on digoxin.         A/P  S/P Acute hypoxic Respiratory failure -  extubated on 12/2 and has been without respiratory distress  Left lower lobe pneumonia.  Staph Aureus in Sputum.  -Suspect COPD.  - On BIPAP as needed.  -Continue O2, nebulizer and antibiotics.  -Aspiration precautions.  - pulmonary is following.    Acute metabolic Encephalopathy.  - secondary to PNA.  - supportive care.    Chronic Atrial Fibrillation.  - on digoxin  - continue current meds.  - On full dose Lovenox for A Fib.    Frequent falls from unsteady gait.  - PT  - fall precautions    Dysphagia.  - swallow eval    lethargy.  dementia  - supportive care  - psych is following.    severe OA  - pain meds    HTN   - will monitor    DM   - A1C  - ISS    Follow up patient.

## 2019-12-05 NOTE — PROGRESS NOTE BEHAVIORAL HEALTH - NSBHCHARTREVIEWLAB_PSY_A_CORE FT
12-05    138  |  103  |  9   ----------------------------<  134<H>  4.0   |  27  |  0.30<L>    Ca    8.9      05 Dec 2019 06:24  Phos  3.6     12-05  Mg     2.2     12-05

## 2019-12-06 LAB
AMMONIA BLD-MCNC: 19 UMOL/L — SIGNIFICANT CHANGE UP (ref 11–32)
ANION GAP SERPL CALC-SCNC: 9 MMOL/L — SIGNIFICANT CHANGE UP (ref 5–17)
BASE EXCESS BLDA CALC-SCNC: 3.1 MMOL/L — HIGH (ref -2–2)
BLOOD GAS COMMENTS: SIGNIFICANT CHANGE UP
BLOOD GAS COMMENTS: SIGNIFICANT CHANGE UP
BLOOD GAS SOURCE: SIGNIFICANT CHANGE UP
BUN SERPL-MCNC: 13 MG/DL — SIGNIFICANT CHANGE UP (ref 7–23)
CALCIUM SERPL-MCNC: 8.7 MG/DL — SIGNIFICANT CHANGE UP (ref 8.5–10.1)
CHLORIDE SERPL-SCNC: 104 MMOL/L — SIGNIFICANT CHANGE UP (ref 96–108)
CO2 SERPL-SCNC: 27 MMOL/L — SIGNIFICANT CHANGE UP (ref 22–31)
CREAT SERPL-MCNC: 0.42 MG/DL — LOW (ref 0.5–1.3)
GLUCOSE BLDC GLUCOMTR-MCNC: 132 MG/DL — HIGH (ref 70–99)
GLUCOSE BLDC GLUCOMTR-MCNC: 137 MG/DL — HIGH (ref 70–99)
GLUCOSE BLDC GLUCOMTR-MCNC: 138 MG/DL — HIGH (ref 70–99)
GLUCOSE BLDC GLUCOMTR-MCNC: 163 MG/DL — HIGH (ref 70–99)
GLUCOSE SERPL-MCNC: 150 MG/DL — HIGH (ref 70–99)
HCO3 BLDA-SCNC: 27 MMOL/L — SIGNIFICANT CHANGE UP (ref 21–29)
HCT VFR BLD CALC: 40.3 % — SIGNIFICANT CHANGE UP (ref 34.5–45)
HGB BLD-MCNC: 12.4 G/DL — SIGNIFICANT CHANGE UP (ref 11.5–15.5)
HOROWITZ INDEX BLDA+IHG-RTO: 28 — SIGNIFICANT CHANGE UP
MCHC RBC-ENTMCNC: 28.6 PG — SIGNIFICANT CHANGE UP (ref 27–34)
MCHC RBC-ENTMCNC: 30.8 GM/DL — LOW (ref 32–36)
MCV RBC AUTO: 92.9 FL — SIGNIFICANT CHANGE UP (ref 80–100)
NRBC # BLD: 0 /100 WBCS — SIGNIFICANT CHANGE UP (ref 0–0)
PCO2 BLDA: 38 MMHG — SIGNIFICANT CHANGE UP (ref 32–46)
PH BLD: 7.46 — HIGH (ref 7.35–7.45)
PLATELET # BLD AUTO: 302 K/UL — SIGNIFICANT CHANGE UP (ref 150–400)
PO2 BLDA: 77 MMHG — SIGNIFICANT CHANGE UP (ref 74–108)
POTASSIUM SERPL-MCNC: 3.4 MMOL/L — LOW (ref 3.5–5.3)
POTASSIUM SERPL-SCNC: 3.4 MMOL/L — LOW (ref 3.5–5.3)
RBC # BLD: 4.34 M/UL — SIGNIFICANT CHANGE UP (ref 3.8–5.2)
RBC # FLD: 14.6 % — HIGH (ref 10.3–14.5)
SAO2 % BLDA: 96 % — SIGNIFICANT CHANGE UP (ref 92–96)
SODIUM SERPL-SCNC: 140 MMOL/L — SIGNIFICANT CHANGE UP (ref 135–145)
WBC # BLD: 11.08 K/UL — HIGH (ref 3.8–10.5)
WBC # FLD AUTO: 11.08 K/UL — HIGH (ref 3.8–10.5)

## 2019-12-06 PROCEDURE — 99231 SBSQ HOSP IP/OBS SF/LOW 25: CPT

## 2019-12-06 PROCEDURE — 99233 SBSQ HOSP IP/OBS HIGH 50: CPT

## 2019-12-06 RX ADMIN — ENOXAPARIN SODIUM 80 MILLIGRAM(S): 100 INJECTION SUBCUTANEOUS at 05:36

## 2019-12-06 RX ADMIN — ENOXAPARIN SODIUM 80 MILLIGRAM(S): 100 INJECTION SUBCUTANEOUS at 18:03

## 2019-12-06 RX ADMIN — Medication 100 MILLIGRAM(S): at 05:35

## 2019-12-06 RX ADMIN — Medication 3 MILLILITER(S): at 17:10

## 2019-12-06 RX ADMIN — Medication 3 MILLILITER(S): at 00:23

## 2019-12-06 RX ADMIN — Medication 3 MILLILITER(S): at 11:04

## 2019-12-06 RX ADMIN — Medication 3 MILLILITER(S): at 23:16

## 2019-12-06 RX ADMIN — Medication 3 MILLILITER(S): at 05:28

## 2019-12-06 NOTE — PROGRESS NOTE BEHAVIORAL HEALTH - NSBHCHARTREVIEWVS_PSY_A_CORE FT
T(C): 36.8 (12-06-19 @ 16:15), Max: 37.6 (12-05-19 @ 21:15)  HR: 106 (12-06-19 @ 17:11) (102 - 116)  BP: 144/70 (12-06-19 @ 16:15) (123/63 - 144/70)  RR: 22 (12-06-19 @ 16:15) (16 - 22)  SpO2: 93% (12-06-19 @ 17:11) (92% - 100%)

## 2019-12-06 NOTE — SWALLOW BEDSIDE ASSESSMENT ADULT - SWALLOW EVAL: DIAGNOSIS
Pt presents with oropharyngeal dysphagia marked by reduced bolus formation/manipulation, decreased AP transport, delayed oral transit time with min palatal stasis, cleared with a second swallow. Pt with suspected delay in pharyngeal swallow trigger (3-6 seconds) and reduced laryngeal elevation. Pt with no overt s/s of aspiration/penetration with puree solids. Pt with delayed cough and wet vocal quality s/p honey thick liquid trials.

## 2019-12-06 NOTE — CHART NOTE - NSCHARTNOTEFT_GEN_A_CORE
To whom it may concern.    This is to state that Ms Manisha Ortega is admitted in the hospital since 11/25/19 to present.   She is legally blind.  She requires acute care in the hospital currently, and will be discharged when she is stable.     Please call if any questions.    Thank you. To whom it may concern.    This is to state that Ms Manisha Ortega is admitted in the hospital since 11/25/19 to present.   As per family (her daughter) she is legally blind and usually signed her name with an "X".  She requires acute care in the hospital currently, and will be discharged when she is stable.     Please call if any questions.    Thank you. To whom it may concern.    This is to state that Ms Manisha Ortega is admitted in the hospital since 11/25/19 to present.   She is legally blind.      As per family (her daughter) she usually signed her name with an "X".  She requires acute care in the hospital currently, and will be discharged when she is stable.     Please call if any questions.    Thank you. To whom it may concern.    This is to state that Ms Manisha Ortega is admitted in the hospital since 11/25/19 to present.   She is legally blind.      As per family (her daughter) she usually signed her name with an "X".   Patient is unable to sign her name because of her vision.  She requires acute care in the hospital currently, and will be discharged when she is stable.     Please call if any questions.    Thank you.

## 2019-12-06 NOTE — PROGRESS NOTE ADULT - ASSESSMENT
75 yo female with PMH dementia, depression, long standing benzo use, afib, HTN, COPD, DM who presented to [facility] with lethargy, AMS, dyspnea and cough on 11/25. She was intubated for acute hypoxic respiratory failure and was found to have pna, uti, and septic shock. Initially during her ICU course she was vasopressor dependant, however was successfully weaned off with use of midodrine. She was extubated on 12/2 and has been without respiratory distress, hypoxia or co2 retention with bipap prn at night which she has not consistently required. There was concern for benzodiazepine withdrawal, however, psych has been consulted and is ongoing eval with plan to continue to hold all of her home psych meds pending neuro improvements as she remains lethargic at times. Today she has remained alert for most of the day. She has failed speech and swallow the past 2 days, with passing bedside nursing eval today and refusing replacement of NGT for feeds. Speech and swallow to see her again tomorrow morning 12/5. ICU stay has been complicated by persistent afib w rvr, on digoxin.         A/P    Acute metabolic Encephalopathy.  Pt is somnolent, easily aroused   - secondary to PNA.  - supportive care.    S/P Acute hypoxic Respiratory failure.  extubated on 12/2 and has been without respiratory distress  Left lower lobe pneumonia.  Staph Aureus in Sputum.  -Suspect COPD.  - On BIPAP as needed.  -Continue O2, nebulizer and antibiotics.  -Aspiration precautions.  - pulmonary is following.    Chronic Atrial Fibrillation.  - on digoxin  - continue current meds.  - On full dose Lovenox for A Fib.    Frequent falls from unsteady gait.  - PT  - fall precautions    Dysphagia.  - swallow eval was reordered.    lethargy.  dementia  - supportive care  - psych is following.    severe OA  - pain meds    HTN   - will monitor    DM   - A1C  - ISS    AM labs

## 2019-12-06 NOTE — PROGRESS NOTE ADULT - SUBJECTIVE AND OBJECTIVE BOX
INTERVAL HPI:  74 year female with HTN, Recently diagnosed DM, Chronic A Fib, TIA/ CVA, Unsteady gait with frequent falls, suspected COPD and dementia.  50 pack year history of smoking, quit 2 years ago. Has been on as needed bronchodilators.  Son reports that she was feeling more than normal cold for about a week, then developed sob along with cough the night before her presentation. Brought in with acute Respiratory distress and altered mental status.  Got intubated in ED for acute hypoxic Respiratory failure secondary to pneumonia.  Got successfully extubated, now on nasal O2.  12/04/19: Out of ICU.    OVERNIGHT EVENTS:  Resting. Passed swallow evaluation.    Vital Signs Last 24 Hrs  T(C): 36.8 (06 Dec 2019 16:15), Max: 37.6 (05 Dec 2019 21:15)  T(F): 98.2 (06 Dec 2019 16:15), Max: 99.6 (05 Dec 2019 21:15)  HR: 106 (06 Dec 2019 17:11) (102 - 116)  BP: 144/70 (06 Dec 2019 16:15) (123/63 - 144/70)  BP(mean): --  RR: 22 (06 Dec 2019 16:15) (16 - 22)  SpO2: 93% (06 Dec 2019 17:11) (92% - 100%)    PHYSICAL EXAM:  GEN:         Awake, responsive and comfortable.  HEENT:    Normal.    RESP:        no wheezing.  CVS:          Regular rate and rhythm.   ABD:         Soft, non-tender, non-distended;     MEDICATIONS  (STANDING):  albuterol/ipratropium for Nebulization 3 milliLiter(s) Nebulizer every 6 hours  dextrose 5%. 1000 milliLiter(s) (50 mL/Hr) IV Continuous <Continuous>  dextrose 50% Injectable 12.5 Gram(s) IV Push once  dextrose 50% Injectable 25 Gram(s) IV Push once  dextrose 50% Injectable 25 Gram(s) IV Push once  enoxaparin Injectable 80 milliGRAM(s) SubCutaneous every 12 hours  insulin lispro (HumaLOG) corrective regimen sliding scale   SubCutaneous every 6 hours  midodrine 10 milliGRAM(s) Oral every 8 hours  polyethylene glycol 3350 17 Gram(s) Oral daily    MEDICATIONS  (PRN):  acetaminophen   Tablet .. 650 milliGRAM(s) Oral every 6 hours PRN Temp greater or equal to 38C (100.4F)  acetaminophen  IVPB .. 1000 milliGRAM(s) IV Intermittent every 8 hours PRN Temp greater or equal to 38C (100.4F), Mild Pain (1 - 3), Moderate Pain (4 - 6)  dextrose 40% Gel 15 Gram(s) Oral once PRN Blood Glucose LESS THAN 70 milliGRAM(s)/deciliter  fentaNYL    Injectable 50 MICROGram(s) IV Push every 4 hours PRN Moderate Pain (4 - 6)  glucagon  Injectable 1 milliGRAM(s) IntraMuscular once PRN Glucose LESS THAN 70 milligrams/deciliter    LABS:                        12.4   11.08 )-----------( 302      ( 06 Dec 2019 07:22 )             40.3     12-06    140  |  104  |  13  ----------------------------<  150<H>  3.4<L>   |  27  |  0.42<L>    Ca    8.7      06 Dec 2019 07:22  Phos  3.6     12-05  Mg     2.2     12-05 12-06 @ 12:45  pH: 7.46  pCO2: 38  pO2: 77  SaO2: 96  12-03 @ 12:10  pH: 7.49  pCO2: 40  pO2: 73  SaO2: 95  12-02 @ 14:53  pH: 7.49  pCO2: 42  pO2: 79  SaO2: 96    ASSESSMENT AND PLAN:  ·	S/P Acute hypoxic Respiratory failure  ·	Left lower lobe pneumonia.  ·	Staph Aureus in Sputum  ·	Acute metabolic Encephalopathy.  ·	Chronic Atrial Fibrillation.  ·	Suspect COPD.  ·	HTN by history.  ·	DM by history.  ·	Frequent falls from unsteady gait.  ·	Dysphagia.    To be started on PO diet.  Continue treatment.

## 2019-12-06 NOTE — SWALLOW BEDSIDE ASSESSMENT ADULT - COMMENTS
CT Head No Cont 11/25/2019 IMPRESSION: No acute intracranial hemorrhage, mass effect, or shift of the midline structures.  CXR 11/28/2019 Impression: Pulmonary vascular congestion. Left retrocardiac opacity with air bronchograms. Pt noted with wet vocal quality s/p intake. SLP prompted pt to clear throat and cough. Pt noted with weak volitional throat clear and cough, placing patient at further risk for aspiration of honey thick liquids.

## 2019-12-06 NOTE — PROGRESS NOTE ADULT - SUBJECTIVE AND OBJECTIVE BOX
Patient is a 74y old  Female who presents with a chief complaint of hypoxia with acute respiratory failure (05 Dec 2019 11:15), has no pain when asked.       OVERNIGHT EVENTS: none    MEDICATIONS  (STANDING):  albuterol/ipratropium for Nebulization 3 milliLiter(s) Nebulizer every 6 hours  dextrose 5%. 1000 milliLiter(s) (50 mL/Hr) IV Continuous <Continuous>  dextrose 50% Injectable 12.5 Gram(s) IV Push once  dextrose 50% Injectable 25 Gram(s) IV Push once  dextrose 50% Injectable 25 Gram(s) IV Push once  enoxaparin Injectable 80 milliGRAM(s) SubCutaneous every 12 hours  insulin lispro (HumaLOG) corrective regimen sliding scale   SubCutaneous every 6 hours  midodrine 10 milliGRAM(s) Oral every 8 hours  polyethylene glycol 3350 17 Gram(s) Oral daily    MEDICATIONS  (PRN):  acetaminophen   Tablet .. 650 milliGRAM(s) Oral every 6 hours PRN Temp greater or equal to 38C (100.4F)  acetaminophen  IVPB .. 1000 milliGRAM(s) IV Intermittent every 8 hours PRN Temp greater or equal to 38C (100.4F), Mild Pain (1 - 3), Moderate Pain (4 - 6)  dextrose 40% Gel 15 Gram(s) Oral once PRN Blood Glucose LESS THAN 70 milliGRAM(s)/deciliter  fentaNYL    Injectable 50 MICROGram(s) IV Push every 4 hours PRN Moderate Pain (4 - 6)  glucagon  Injectable 1 milliGRAM(s) IntraMuscular once PRN Glucose LESS THAN 70 milligrams/deciliter        REVIEW OF SYSTEMS: unable to obtain, patient is somnolent.        Vital Signs Last 24 Hrs  T(C): 37.2 (06 Dec 2019 05:07), Max: 37.6 (05 Dec 2019 21:15)  T(F): 98.9 (06 Dec 2019 05:07), Max: 99.6 (05 Dec 2019 21:15)  HR: 103 (06 Dec 2019 08:45) (102 - 113)  BP: 123/63 (06 Dec 2019 05:07) (123/63 - 137/62)  BP(mean): --  RR: 19 (06 Dec 2019 05:07) (17 - 19)  SpO2: 93% (06 Dec 2019 08:45) (93% - 100%)    PHYSICAL EXAM:  GENERAL: NAD, well-groomed, well-developed  HEAD:  Atraumatic, Normocephalic  EYES:   conjunctiva and sclera clear  ENMT:  Moist mucous membranes   NECK: Supple, No JVD   NERVOUS SYSTEM:  Alert & easily aroused, somnolent. ,    CHEST/LUNG: Fair air entry bilaterally, no rales  HEART: Regular rate and rhythm; No murmurs, rubs, or gallops  ABDOMEN: Soft, Nontender, Nondistended; Bowel sounds present  EXTREMITIES:  2+ Peripheral Pulses, No clubbing, cyanosis, or edema  LYMPH: No lymphadenopathy noted  SKIN: No petechiae    LABS:                        12.4   11.08 )-----------( 302      ( 06 Dec 2019 07:22 )             40.3     12-06    140  |  104  |  13  ----------------------------<  150<H>  3.4<L>   |  27  |  0.42<L>    Ca    8.7      06 Dec 2019 07:22  Phos  3.6     12-05  Mg     2.2     12-05         cardiac markers     CAPILLARY BLOOD GLUCOSE      POCT Blood Glucose.: 132 mg/dL (06 Dec 2019 11:27)  POCT Blood Glucose.: 137 mg/dL (06 Dec 2019 05:42)  POCT Blood Glucose.: 142 mg/dL (05 Dec 2019 23:39)  POCT Blood Glucose.: 142 mg/dL (05 Dec 2019 17:37)    Cultures    RADIOLOGY & ADDITIONAL TESTS:    Imaging Personally Reviewed:  [ ] YES  [ ] NO    Consultant(s) Notes Reviewed:  [ x ] YES  [ ] NO    Care Discussed with Consultants/Other Providers [ x ] YES  [ ] NO

## 2019-12-06 NOTE — PHYSICAL THERAPY INITIAL EVALUATION ADULT - ACTIVE RANGE OF MOTION EXAMINATION, REHAB EVAL
No beds available at [facility]  Pt requesting SL[facility] 2W  Pt accepted to 2W by Dr Kofi Pierce  Insurance Authorization for admission:   Phone call placed to [employer]  Phone number: [phone]  Spoke to [employer]  [facility], no transport needed maureen. upper extremity Active ROM was WNL (within normal limits)/deficits as listed below/AAROM except both shoulders limited due to contractures.

## 2019-12-06 NOTE — SWALLOW BEDSIDE ASSESSMENT ADULT - SLP PERTINENT HISTORY OF CURRENT PROBLEM
swallow alistairals completed 12/3/2019 & 12/4/2019 at which time NPO was recommended. see report for details.

## 2019-12-06 NOTE — SWALLOW BEDSIDE ASSESSMENT ADULT - SWALLOW EVAL: FUNCTIONAL LEVEL AT TIME OF EVAL
pt fairly alert and accepted PO trials
pt lethargic and accepted PO trials with max cues from SLP
pt fairly alert and accepted PO trials

## 2019-12-06 NOTE — PHYSICAL THERAPY INITIAL EVALUATION ADULT - GENERAL OBSERVATIONS, REHAB EVAL
Chart (EMR) reviewed. Received supine c HOB elevated, NAD. +O2 via nasal cannula, +Bipap on standby, +cardiac monitor, +IV intact. Lethargic but arousable. Ox1. Able to follow simple commands/directions. Daughter (Naima) present.

## 2019-12-06 NOTE — PROGRESS NOTE BEHAVIORAL HEALTH - NSBHFUPINTERVALHXFT_PSY_A_CORE
See "Event Note." No significant improvement in psychiatric clinical presentation - Patient has not been able to even follow directions today such as "look at me;" continues to lie in bed staring ahead and does not communicate in any meaningful manner. Daughter tried to get patient to say hello to Writer which Patient was not able to do and eventually said only "what" in a croaking manner after elapsed time and said nothing else. Patient was not even able to make eye contact with Writer or even non-verbally communicate.

## 2019-12-06 NOTE — SWALLOW BEDSIDE ASSESSMENT ADULT - ADDITIONAL RECOMMENDATIONS
Pt noted with whitish coating along lingual/palatal surface. Oral care recommended. NSG to examine oral cavity.

## 2019-12-06 NOTE — CHART NOTE - NSCHARTNOTEFT_GEN_A_CORE
Patient's daughter, Naima Smith, is at bedside. Ms Smith asked Writer to provide a letter that Patient's vision is impaired and that she can sign with an "x." Writer declined to write such a letter for the reasons outlined in this note. In short, Ms Smith told Writer that Patient applied for a reverse mortgage prior to admission, she has poor vision so the company allowed her to sign her signature with an "x" at that time, but now that company wants to send a  to the hospital for patient to sign in front of them. Writer told Ms Smith that her mother is not in an intact state of mind at this time to sign any legal documents/contracts etc. (NOTE: Patient has not been able to even follow directions today such as "look at me;" continues to lie in bed staring ahead and does not communicate in any meaningful manner. Daughter tried to get patient to say hello to Writer which Patient was not able to do and eventually said only "what" in a croaking manner after elapsed time and said nothing else. Patient was not even able to make eye contact with Writer or even non-verbally communicate).     Ms Smith said that she has no money at this time to pay for patient's aides and Patient has no access to her money. Ms Smith reported that she has a  who has "Power of " drawn up but the  is out of town until Tuesday. Writer provided basic psychoeducation / legal concept to Ms Smith including that one has to be of "sound mind and body" to enter into a contract/to sign legal documents and such competence is determined in a Court of law by a . Hospital staff cannot make such a determination. As a psychiatrist, Writer can determine capacity to make medical decisions only. Ms Smith admitted that the company / bank sponsoring the reverse mortgage provided an extension as she told them Patient is hospitalized so in essence, the signature is not needed ASAP.     In conclusion, Writer pointed out to Ms Smith that her mother is clearly not in any mental or physical shape to be aware of her surroundings, much less able to comprehend anything that is provided for her such as a legal document. Ms Smith asked if Patient's sensorium would clear by next week - Writer answered that it is not possible to predict; Patient has been through a lot and her recovery is one day at a time.    Given these events, Writer discussed this with Hospitalist Attending and nursing supervisor.

## 2019-12-06 NOTE — PHYSICAL THERAPY INITIAL EVALUATION ADULT - ADDITIONAL COMMENTS
As per daughter (Naima), patient lives alone in a pvt house. Has HHA 24/7. Non-ambulatory. Dependent c all ADL's and transfers using fransico lift. Pt also has own hospital bed and wheelchair.

## 2019-12-06 NOTE — SWALLOW BEDSIDE ASSESSMENT ADULT - SLP GENERAL OBSERVATIONS
Pt was seen bedside. Pts daughter present during evaluation. Pt oriented grossly to self (able to state name; however, not age or birthday). pt inconsistently followed directions with max prompts from SLP and inconsistently responded to autobiographical/orientation questions despite max encouragement. Pt nonverbal except for 2-3 utterances such as "yes/no," "oh good" and "i'm wonderful" and essentially noncommunicative except for head nod/shake. Pt with low vocal volume; increased intensity when SLP requested repetition.

## 2019-12-06 NOTE — SWALLOW BEDSIDE ASSESSMENT ADULT - PHARYNGEAL PHASE
Delayed pharyngeal swallow/Decreased laryngeal elevation Decreased laryngeal elevation/Delayed cough post oral intake/Wet vocal quality post oral intake/delayed cough x1/5 trials/Delayed pharyngeal swallow/Multiple swallows

## 2019-12-06 NOTE — SWALLOW BEDSIDE ASSESSMENT ADULT - ORAL PHASE
Decreased anterior-posterior movement of the bolus/Palatal stasis/Delayed oral transit time/trace Delayed oral transit time/Decreased anterior-posterior movement of the bolus

## 2019-12-06 NOTE — PROGRESS NOTE ADULT - ASSESSMENT
75 yo female with PMH dementia, depression, long standing benzo use, afib, HTN, COPD, DM who presented to [facility] with lethargy, AMS, dyspnea and cough on 11/25. She was intubated for acute hypoxic respiratory failure and was found to have pna, uti, and septic shock. Initially during her ICU course she was vasopressor dependant, however was successfully weaned off with use of midodrine. She was extubated on 12/2 and has been without respiratory distress, hypoxia or co2 retention with bipap prn at night which she has not consistently required. There was concern for benzodiazepine withdrawal, however, psych has been consulted and is ongoing eval with plan to continue to hold all of her home psych meds pending neuro improvements as she remains lethargic at times. Today she has remained alert for most of the day. She has failed speech and swallow the past 2 days, with passing bedside nursing eval today and refusing replacement of NGT for feeds. Speech and swallow to see her again tomorrow morning 12/5. ICU stay has been complicated by persistent afib w rvr, on digoxin.         A/P    Acute metabolic Encephalopathy.  Pt is somnolent, easily aroused   - secondary to PNA.  - supportive care.    S/P Acute hypoxic Respiratory failure.  extubated on 12/2 and has been without respiratory distress  Left lower lobe pneumonia.  Staph Aureus in Sputum.  -Suspect COPD.  - On BIPAP as needed.  -Continue O2, nebulizer and antibiotics.  -Aspiration precautions.  - pulmonary is following.    Chronic Atrial Fibrillation.  - on digoxin  - continue current meds.  - On full dose Lovenox for A Fib.    Frequent falls from unsteady gait.  - PT  - fall precautions    Dysphagia.  - swallow eval was reordered.    lethargy.  dementia  - supportive care  - psych is following.    severe OA  - pain meds    HTN   - will monitor    DM   - A1C  - ISS

## 2019-12-06 NOTE — PROGRESS NOTE BEHAVIORAL HEALTH - NSBHCHARTREVIEWLAB_PSY_A_CORE FT
12-06    140  |  104  |  13  ----------------------------<  150<H>  3.4<L>   |  27  |  0.42<L>    Ca    8.7      06 Dec 2019 07:22  Phos  3.6     12-05  Mg     2.2     12-05

## 2019-12-06 NOTE — PHYSICAL THERAPY INITIAL EVALUATION ADULT - PERTINENT HX OF CURRENT PROBLEM, REHAB EVAL
Patient is a 73 y/o female admitted to [facility] due to acute respiratory failure with hypoxia, UTI.

## 2019-12-07 LAB
ANION GAP SERPL CALC-SCNC: 8 MMOL/L — SIGNIFICANT CHANGE UP (ref 5–17)
BUN SERPL-MCNC: 11 MG/DL — SIGNIFICANT CHANGE UP (ref 7–23)
CALCIUM SERPL-MCNC: 8.6 MG/DL — SIGNIFICANT CHANGE UP (ref 8.5–10.1)
CHLORIDE SERPL-SCNC: 104 MMOL/L — SIGNIFICANT CHANGE UP (ref 96–108)
CO2 SERPL-SCNC: 28 MMOL/L — SIGNIFICANT CHANGE UP (ref 22–31)
CREAT SERPL-MCNC: 0.25 MG/DL — LOW (ref 0.5–1.3)
GLUCOSE BLDC GLUCOMTR-MCNC: 135 MG/DL — HIGH (ref 70–99)
GLUCOSE BLDC GLUCOMTR-MCNC: 137 MG/DL — HIGH (ref 70–99)
GLUCOSE BLDC GLUCOMTR-MCNC: 139 MG/DL — HIGH (ref 70–99)
GLUCOSE BLDC GLUCOMTR-MCNC: 160 MG/DL — HIGH (ref 70–99)
GLUCOSE SERPL-MCNC: 133 MG/DL — HIGH (ref 70–99)
HCT VFR BLD CALC: 39.4 % — SIGNIFICANT CHANGE UP (ref 34.5–45)
HGB BLD-MCNC: 12 G/DL — SIGNIFICANT CHANGE UP (ref 11.5–15.5)
MCHC RBC-ENTMCNC: 28 PG — SIGNIFICANT CHANGE UP (ref 27–34)
MCHC RBC-ENTMCNC: 30.5 GM/DL — LOW (ref 32–36)
MCV RBC AUTO: 91.8 FL — SIGNIFICANT CHANGE UP (ref 80–100)
NRBC # BLD: 0 /100 WBCS — SIGNIFICANT CHANGE UP (ref 0–0)
PLATELET # BLD AUTO: 298 K/UL — SIGNIFICANT CHANGE UP (ref 150–400)
POTASSIUM SERPL-MCNC: 3.3 MMOL/L — LOW (ref 3.5–5.3)
POTASSIUM SERPL-SCNC: 3.3 MMOL/L — LOW (ref 3.5–5.3)
RBC # BLD: 4.29 M/UL — SIGNIFICANT CHANGE UP (ref 3.8–5.2)
RBC # FLD: 14.6 % — HIGH (ref 10.3–14.5)
SODIUM SERPL-SCNC: 140 MMOL/L — SIGNIFICANT CHANGE UP (ref 135–145)
WBC # BLD: 8.9 K/UL — SIGNIFICANT CHANGE UP (ref 3.8–10.5)
WBC # FLD AUTO: 8.9 K/UL — SIGNIFICANT CHANGE UP (ref 3.8–10.5)

## 2019-12-07 PROCEDURE — 99233 SBSQ HOSP IP/OBS HIGH 50: CPT

## 2019-12-07 RX ORDER — ACETAMINOPHEN 500 MG
1000 TABLET ORAL ONCE
Refills: 0 | Status: COMPLETED | OUTPATIENT
Start: 2019-12-07 | End: 2019-12-07

## 2019-12-07 RX ADMIN — Medication 3 MILLILITER(S): at 17:40

## 2019-12-07 RX ADMIN — Medication 400 MILLIGRAM(S): at 15:05

## 2019-12-07 RX ADMIN — MIDODRINE HYDROCHLORIDE 10 MILLIGRAM(S): 2.5 TABLET ORAL at 06:33

## 2019-12-07 RX ADMIN — POLYETHYLENE GLYCOL 3350 17 GRAM(S): 17 POWDER, FOR SOLUTION ORAL at 17:21

## 2019-12-07 RX ADMIN — Medication 3 MILLILITER(S): at 23:22

## 2019-12-07 RX ADMIN — Medication 3 MILLILITER(S): at 05:21

## 2019-12-07 RX ADMIN — ENOXAPARIN SODIUM 80 MILLIGRAM(S): 100 INJECTION SUBCUTANEOUS at 06:33

## 2019-12-07 RX ADMIN — Medication 1000 MILLIGRAM(S): at 15:25

## 2019-12-07 RX ADMIN — Medication 3 MILLILITER(S): at 11:10

## 2019-12-07 RX ADMIN — Medication 400 MILLIGRAM(S): at 22:33

## 2019-12-07 RX ADMIN — Medication 1000 MILLIGRAM(S): at 23:30

## 2019-12-07 RX ADMIN — Medication 2: at 11:46

## 2019-12-07 RX ADMIN — ENOXAPARIN SODIUM 80 MILLIGRAM(S): 100 INJECTION SUBCUTANEOUS at 17:20

## 2019-12-07 NOTE — PROGRESS NOTE ADULT - SUBJECTIVE AND OBJECTIVE BOX
Patient is a 74y old  Female who presents with a chief complaint of hypoxia with acute respiratory failure (06 Dec 2019 18:56)       OVERNIGHT EVENTS:    MEDICATIONS  (STANDING):  albuterol/ipratropium for Nebulization 3 milliLiter(s) Nebulizer every 6 hours  dextrose 5%. 1000 milliLiter(s) (50 mL/Hr) IV Continuous <Continuous>  dextrose 50% Injectable 12.5 Gram(s) IV Push once  dextrose 50% Injectable 25 Gram(s) IV Push once  dextrose 50% Injectable 25 Gram(s) IV Push once  enoxaparin Injectable 80 milliGRAM(s) SubCutaneous every 12 hours  insulin lispro (HumaLOG) corrective regimen sliding scale   SubCutaneous every 6 hours  midodrine 10 milliGRAM(s) Oral every 8 hours  polyethylene glycol 3350 17 Gram(s) Oral daily    MEDICATIONS  (PRN):  acetaminophen   Tablet .. 650 milliGRAM(s) Oral every 6 hours PRN Temp greater or equal to 38C (100.4F)  acetaminophen  IVPB .. 1000 milliGRAM(s) IV Intermittent every 8 hours PRN Temp greater or equal to 38C (100.4F), Mild Pain (1 - 3), Moderate Pain (4 - 6)  dextrose 40% Gel 15 Gram(s) Oral once PRN Blood Glucose LESS THAN 70 milliGRAM(s)/deciliter  glucagon  Injectable 1 milliGRAM(s) IntraMuscular once PRN Glucose LESS THAN 70 milligrams/deciliter        REVIEW OF SYSTEMS:  CONSTITUTIONAL: No fever, weight loss, or fatigue  EYES: No eye pain, visual disturbances, or discharge  ENMT:  No difficulty hearing, tinnitus, vertigo; No sinus or throat pain  NECK: No pain or stiffness  RESPIRATORY: No cough, wheezing, chills or hemoptysis; No shortness of breath  CARDIOVASCULAR: No chest pain, palpitations, dizziness, or leg swelling  GASTROINTESTINAL: No abdominal or epigastric pain. No nausea, vomiting, or hematemesis; No diarrhea or constipation. No melena or hematochezia.  GENITOURINARY: No dysuria, frequency, hematuria, or incontinence  NEUROLOGICAL: No headaches, memory loss, loss of strength, numbness, or tremors  SKIN: No itching, burning, rashes, or lesions      Vital Signs Last 24 Hrs  T(C): 37.1 (07 Dec 2019 10:00), Max: 37.1 (07 Dec 2019 10:00)  T(F): 98.8 (07 Dec 2019 10:00), Max: 98.8 (07 Dec 2019 10:00)  HR: 118 (07 Dec 2019 10:00) (90 - 118)  BP: 125/51 (07 Dec 2019 10:00) (121/59 - 144/70)  BP(mean): --  RR: 18 (07 Dec 2019 10:00) (16 - 22)  SpO2: 95% (07 Dec 2019 10:00) (92% - 98%)    PHYSICAL EXAM:  GENERAL: NAD, well-developed  HEAD:  Normocephalic  EYES:   conjunctiva and sclera clear  ENMT:  Moist mucous membranes   NECK: Supple, No JVD   NERVOUS SYSTEM:  Alert & easily aroused, somnolent.     CHEST/LUNG: Fair air entry bilaterally, no rales  HEART: S1,S2;    ABDOMEN: Soft, Nontender, Nondistended; Bowel sounds present  EXTREMITIES:  2+ Peripheral Pulses, No clubbing, cyanosis, or edema  LYMPH: No lymphadenopathy noted  SKIN: No petechiae    LABS:                        12.0   8.90  )-----------( 298      ( 07 Dec 2019 07:06 )             39.4     12-07    140  |  104  |  11  ----------------------------<  133<H>  3.3<L>   |  28  |  0.25<L>    Ca    8.6      07 Dec 2019 07:06         cardiac markers     CAPILLARY BLOOD GLUCOSE      POCT Blood Glucose.: 160 mg/dL (07 Dec 2019 11:16)  POCT Blood Glucose.: 139 mg/dL (07 Dec 2019 06:25)  POCT Blood Glucose.: 138 mg/dL (06 Dec 2019 23:50)  POCT Blood Glucose.: 163 mg/dL (06 Dec 2019 16:20)    Cultures    RADIOLOGY & ADDITIONAL TESTS:    Imaging Personally Reviewed:  [ ] YES  [ ] NO    Consultant(s) Notes Reviewed:  [ x ] YES  [ ] NO    Care Discussed with Consultants/Other Providers [ ] YES  [ ] NO

## 2019-12-07 NOTE — PROGRESS NOTE ADULT - SUBJECTIVE AND OBJECTIVE BOX
INTERVAL HPI:  74 year female with HTN, Recently diagnosed DM, Chronic A Fib, TIA/ CVA, Unsteady gait with frequent falls, suspected COPD and dementia.  50 pack year history of smoking, quit 2 years ago. Has been on as needed bronchodilators.  Son reports that she was feeling more than normal cold for about a week, then developed sob along with cough the night before her presentation. Brought in with acute Respiratory distress and altered mental status.  Got intubated in ED for acute hypoxic Respiratory failure secondary to pneumonia.  Got successfully extubated, now on nasal O2.  12/04/19: Out of ICU.    OVERNIGHT EVENTS:  Resting. Son reports poor PO intake.    Vital Signs Last 24 Hrs  T(C): 37.1 (07 Dec 2019 10:00), Max: 37.1 (07 Dec 2019 10:00)  T(F): 98.8 (07 Dec 2019 10:00), Max: 98.8 (07 Dec 2019 10:00)  HR: 101 (07 Dec 2019 14:34) (90 - 118)  BP: 135/65 (07 Dec 2019 14:34) (121/59 - 144/70)  BP(mean): --  RR: 18 (07 Dec 2019 14:34) (18 - 22)  SpO2: 97% (07 Dec 2019 14:34) (93% - 98%)    PHYSICAL EXAM:  GEN:         Awake, responsive and comfortable.  HEENT:    Normal.    RESP:      no wheezing.  CVS:          Regular rate and rhythm.   ABD:         Soft, non-tender, non-distended;     MEDICATIONS  (STANDING):  albuterol/ipratropium for Nebulization 3 milliLiter(s) Nebulizer every 6 hours  dextrose 5%. 1000 milliLiter(s) (50 mL/Hr) IV Continuous <Continuous>  dextrose 50% Injectable 12.5 Gram(s) IV Push once  dextrose 50% Injectable 25 Gram(s) IV Push once  dextrose 50% Injectable 25 Gram(s) IV Push once  enoxaparin Injectable 80 milliGRAM(s) SubCutaneous every 12 hours  insulin lispro (HumaLOG) corrective regimen sliding scale   SubCutaneous every 6 hours  midodrine 10 milliGRAM(s) Oral every 8 hours  polyethylene glycol 3350 17 Gram(s) Oral daily    MEDICATIONS  (PRN):  acetaminophen   Tablet .. 650 milliGRAM(s) Oral every 6 hours PRN Temp greater or equal to 38C (100.4F)  dextrose 40% Gel 15 Gram(s) Oral once PRN Blood Glucose LESS THAN 70 milliGRAM(s)/deciliter  glucagon  Injectable 1 milliGRAM(s) IntraMuscular once PRN Glucose LESS THAN 70 milligrams/deciliter    LABS:                        12.0   8.90  )-----------( 298      ( 07 Dec 2019 07:06 )             39.4     12-07    140  |  104  |  11  ----------------------------<  133<H>  3.3<L>   |  28  |  0.25<L>    Ca    8.6      07 Dec 2019 07:06    12-06 @ 12:45  pH: 7.46  pCO2: 38  pO2: 77  SaO2: 96  12-03 @ 12:10  pH: 7.49  pCO2: 40  pO2: 73  SaO2: 95  12-02 @ 14:53  pH: 7.49  pCO2: 42  pO2: 79  SaO2: 96  ASSESSMENT AND PLAN:  ·	S/P Acute hypoxic Respiratory failure  ·	Left lower lobe pneumonia.  ·	Staph Aureus in Sputum  ·	Acute metabolic Encephalopathy.  ·	Chronic Atrial Fibrillation.  ·	Suspect COPD.  ·	HTN by history.  ·	DM by history.  ·	Frequent falls from unsteady gait.  ·	Dysphagia.    Continue O2 and nebulizer.  Out of bed to chair if possible.  Discussed with son at bed side.

## 2019-12-07 NOTE — PROGRESS NOTE ADULT - ASSESSMENT
75 yo female with PMH dementia, depression, long standing benzo use, afib, HTN, COPD, DM who presented to [facility] with lethargy, AMS, dyspnea and cough on 11/25. She was intubated for acute hypoxic respiratory failure and was found to have pna, uti, and septic shock. Initially during her ICU course she was vasopressor dependant, however was successfully weaned off with use of midodrine. She was extubated on 12/2 and has been without respiratory distress, hypoxia or co2 retention with bipap prn at night which she has not consistently required. There was concern for benzodiazepine withdrawal, however, psych has been consulted and is ongoing eval with plan to continue to hold all of her home psych meds pending neuro improvements as she remains lethargic at times. Today she has remained alert for most of the day. She has failed speech and swallow the past 2 days, with passing bedside nursing eval today and refusing replacement of NGT for feeds. Speech and swallow to see her again tomorrow morning 12/5. ICU stay has been complicated by persistent afib w rvr, on digoxin.         A/P  Acute metabolic Encephalopathy.  - Pt is somnolent, easily aroused   - secondary to PNA.  - supportive care.    Patient is legally blind as per family.  - and she usually signed her name with an "X" as per daughter.    S/P Acute hypoxic Respiratory failure.  extubated on 12/2 and has been without respiratory distress  Left lower lobe pneumonia.  Staph Aureus in Sputum.  - Suspect COPD.  - On BIPAP as needed.  - Continue O2, nebulizer.  - continue IV antibiotics.  - Aspiration precautions.  - pulmonary is following.    Chronic Atrial Fibrillation.  - on digoxin  - continue current meds.  - On full dose Lovenox for A Fib.    Frequent falls from unsteady gait.  - PT  - fall precautions    Dysphagia.  - swallow eval noted, recs puree diet.    dementia  - Somnolent  - HOLD all psychiatric medications at this time as per psych  - supportive care  - psych is following.    severe OA  - pain meds    HTN   - will monitor    DM   - A1C  - ISS    PT eval noted:   Patient has own hospital bed, fransico lift and wheelchair.                           the recommend home; no skilled PT needs.

## 2019-12-08 LAB
GLUCOSE BLDC GLUCOMTR-MCNC: 124 MG/DL — HIGH (ref 70–99)
GLUCOSE BLDC GLUCOMTR-MCNC: 144 MG/DL — HIGH (ref 70–99)
GLUCOSE BLDC GLUCOMTR-MCNC: 155 MG/DL — HIGH (ref 70–99)
GLUCOSE BLDC GLUCOMTR-MCNC: 165 MG/DL — HIGH (ref 70–99)

## 2019-12-08 PROCEDURE — 99232 SBSQ HOSP IP/OBS MODERATE 35: CPT

## 2019-12-08 RX ORDER — ACETAMINOPHEN 500 MG
1000 TABLET ORAL ONCE
Refills: 0 | Status: COMPLETED | OUTPATIENT
Start: 2019-12-08 | End: 2019-12-08

## 2019-12-08 RX ORDER — ACETAMINOPHEN 500 MG
650 TABLET ORAL EVERY 6 HOURS
Refills: 0 | Status: DISCONTINUED | OUTPATIENT
Start: 2019-12-08 | End: 2019-12-09

## 2019-12-08 RX ADMIN — ENOXAPARIN SODIUM 80 MILLIGRAM(S): 100 INJECTION SUBCUTANEOUS at 17:16

## 2019-12-08 RX ADMIN — POLYETHYLENE GLYCOL 3350 17 GRAM(S): 17 POWDER, FOR SOLUTION ORAL at 17:17

## 2019-12-08 RX ADMIN — Medication 650 MILLIGRAM(S): at 17:38

## 2019-12-08 RX ADMIN — Medication 3 MILLILITER(S): at 18:30

## 2019-12-08 RX ADMIN — Medication 400 MILLIGRAM(S): at 12:03

## 2019-12-08 RX ADMIN — Medication 400 MILLIGRAM(S): at 23:30

## 2019-12-08 RX ADMIN — Medication 3 MILLILITER(S): at 11:38

## 2019-12-08 RX ADMIN — Medication 1000 MILLIGRAM(S): at 12:30

## 2019-12-08 RX ADMIN — ENOXAPARIN SODIUM 80 MILLIGRAM(S): 100 INJECTION SUBCUTANEOUS at 05:35

## 2019-12-08 RX ADMIN — Medication 2: at 17:15

## 2019-12-08 RX ADMIN — Medication 3 MILLILITER(S): at 05:06

## 2019-12-08 NOTE — PROGRESS NOTE ADULT - SUBJECTIVE AND OBJECTIVE BOX
INTERVAL HPI:  74 year female with HTN, Recently diagnosed DM, Chronic A Fib, TIA/ CVA, Unsteady gait with frequent falls, suspected COPD and dementia.  50 pack year history of smoking, quit 2 years ago. Has been on as needed bronchodilators.  Son reports that she was feeling more than normal cold for about a week, then developed sob along with cough the night before her presentation. Brought in with acute Respiratory distress and altered mental status.  Got intubated in ED for acute hypoxic Respiratory failure secondary to pneumonia.  Got successfully extubated, now on nasal O2.  12/04/19: Out of ICU.    OVERNIGHT EVENTS:  Family reports mucus at times which is difficult to expectorate.    Vital Signs Last 24 Hrs  T(C): 36.2 (08 Dec 2019 11:00), Max: 36.5 (08 Dec 2019 00:12)  T(F): 97.1 (08 Dec 2019 11:00), Max: 97.7 (08 Dec 2019 00:12)  HR: 116 (08 Dec 2019 16:53) (82 - 119)  BP: 134/71 (08 Dec 2019 16:00) (130/69 - 141/69)  BP(mean): --  RR: 17 (08 Dec 2019 16:00) (17 - 18)  SpO2: 95% (08 Dec 2019 16:53) (94% - 99%)    PHYSICAL EXAM:  GEN:         Awake, rcomfortable.  HEENT:    Normal.    RESP:        no wheezing.  CVS:          Regular rate and rhythm.   ABD:         Soft, non-tender, non-distended;     MEDICATIONS  (STANDING):  albuterol/ipratropium for Nebulization 3 milliLiter(s) Nebulizer every 6 hours  dextrose 5%. 1000 milliLiter(s) (50 mL/Hr) IV Continuous <Continuous>  dextrose 50% Injectable 12.5 Gram(s) IV Push once  dextrose 50% Injectable 25 Gram(s) IV Push once  dextrose 50% Injectable 25 Gram(s) IV Push once  enoxaparin Injectable 80 milliGRAM(s) SubCutaneous every 12 hours  insulin lispro (HumaLOG) corrective regimen sliding scale   SubCutaneous every 6 hours  midodrine 10 milliGRAM(s) Oral every 8 hours  polyethylene glycol 3350 17 Gram(s) Oral daily    MEDICATIONS  (PRN):  acetaminophen   Tablet .. 650 milliGRAM(s) Oral every 6 hours PRN Temp greater or equal to 38C (100.4F)  acetaminophen  Suppository .. 650 milliGRAM(s) Rectal every 6 hours PRN Moderate Pain (4 - 6)  dextrose 40% Gel 15 Gram(s) Oral once PRN Blood Glucose LESS THAN 70 milliGRAM(s)/deciliter  glucagon  Injectable 1 milliGRAM(s) IntraMuscular once PRN Glucose LESS THAN 70 milligrams/deciliter    LABS:                        12.0   8.90  )-----------( 298      ( 07 Dec 2019 07:06 )             39.4     12-07    140  |  104  |  11  ----------------------------<  133<H>  3.3<L>   |  28  |  0.25<L>    Ca    8.6      07 Dec 2019 07:06    12-06 @ 12:45  pH: 7.46  pCO2: 38  pO2: 77  SaO2: 96  12-03 @ 12:10  pH: 7.49  pCO2: 40  pO2: 73  SaO2: 95  12-02 @ 14:53  pH: 7.49  pCO2: 42  pO2: 79  SaO2: 96    ASSESSMENT AND PLAN:  ·	S/P Acute hypoxic Respiratory failure  ·	Left lower lobe pneumonia.  ·	Staph Aureus in Sputum  ·	Acute metabolic Encephalopathy.  ·	Chronic Atrial Fibrillation.  ·	Suspect COPD.  ·	HTN by history.  ·	DM by history.  ·	Frequent falls from unsteady gait.  ·	Dysphagia.    Suction PRN as needed.  Continue O2 and nebulizer.  Out of bed to chair if possible.  Discussed with son & Daughter at bed side.

## 2019-12-08 NOTE — PROGRESS NOTE ADULT - SUBJECTIVE AND OBJECTIVE BOX
Patient is a 74y old  Female who presents with a chief complaint of hypoxia with acute respiratory failure (07 Dec 2019 15:08), she is in NAD.       OVERNIGHT EVENTS: none    MEDICATIONS  (STANDING):  acetaminophen  IVPB .. 1000 milliGRAM(s) IV Intermittent once  albuterol/ipratropium for Nebulization 3 milliLiter(s) Nebulizer every 6 hours  dextrose 5%. 1000 milliLiter(s) (50 mL/Hr) IV Continuous <Continuous>  dextrose 50% Injectable 12.5 Gram(s) IV Push once  dextrose 50% Injectable 25 Gram(s) IV Push once  dextrose 50% Injectable 25 Gram(s) IV Push once  enoxaparin Injectable 80 milliGRAM(s) SubCutaneous every 12 hours  insulin lispro (HumaLOG) corrective regimen sliding scale   SubCutaneous every 6 hours  midodrine 10 milliGRAM(s) Oral every 8 hours  polyethylene glycol 3350 17 Gram(s) Oral daily    MEDICATIONS  (PRN):  acetaminophen   Tablet .. 650 milliGRAM(s) Oral every 6 hours PRN Temp greater or equal to 38C (100.4F)  dextrose 40% Gel 15 Gram(s) Oral once PRN Blood Glucose LESS THAN 70 milliGRAM(s)/deciliter  glucagon  Injectable 1 milliGRAM(s) IntraMuscular once PRN Glucose LESS THAN 70 milligrams/deciliter        REVIEW OF SYSTEMS:  unable to obtain.    Vital Signs Last 24 Hrs  T(C): 36.4 (08 Dec 2019 05:03), Max: 36.8 (07 Dec 2019 17:12)  T(F): 97.5 (08 Dec 2019 05:03), Max: 98.3 (07 Dec 2019 17:12)  HR: 91 (08 Dec 2019 09:34) (82 - 119)  BP: 141/69 (08 Dec 2019 05:03) (110/54 - 141/69)  BP(mean): 72 (07 Dec 2019 17:12) (72 - 72)  RR: 18 (08 Dec 2019 05:03) (16 - 18)  SpO2: 97% (08 Dec 2019 09:34) (94% - 99%)    PHYSICAL EXAM:  GENERAL: NAD, well-developed  HEAD:  Normocephalic  EYES:   conjunctiva and sclera clear  ENMT:  Moist mucous membranes   NECK: Supple, No JVD   NERVOUS SYSTEM:  Alert & easily aroused, somnolent.     CHEST/LUNG: Fair air entry bilaterally, no rales  HEART: S1,S2;  irreg.  ABDOMEN: Soft, Nontender, Nondistended; Bowel sounds present  EXTREMITIES:  2+ Peripheral Pulses, No clubbing, cyanosis, or edema  LYMPH: No lymphadenopathy noted  SKIN: No petechiae    LABS:                        12.0   8.90  )-----------( 298      ( 07 Dec 2019 07:06 )             39.4     12-07    140  |  104  |  11  ----------------------------<  133<H>  3.3<L>   |  28  |  0.25<L>    Ca    8.6      07 Dec 2019 07:06         cardiac markers     CAPILLARY BLOOD GLUCOSE      POCT Blood Glucose.: 165 mg/dL (08 Dec 2019 11:13)  POCT Blood Glucose.: 124 mg/dL (08 Dec 2019 06:04)  POCT Blood Glucose.: 137 mg/dL (07 Dec 2019 22:11)  POCT Blood Glucose.: 135 mg/dL (07 Dec 2019 16:17)    Cultures    RADIOLOGY & ADDITIONAL TESTS:    Imaging Personally Reviewed:  [ ] YES  [ ] NO    Consultant(s) Notes Reviewed:  [ x ] YES  [ ] NO    Care Discussed with Consultants/Other Providers [ ] YES  [ ] NO

## 2019-12-08 NOTE — PROGRESS NOTE ADULT - ASSESSMENT
73 yo female with PMH dementia, depression, long standing benzo use, afib, HTN, COPD, DM who presented to [facility] with lethargy, AMS, dyspnea and cough on 11/25. She was intubated for acute hypoxic respiratory failure and was found to have pna, uti, and septic shock. Initially during her ICU course she was vasopressor dependant, however was successfully weaned off with use of midodrine. She was extubated on 12/2 and has been without respiratory distress, hypoxia or co2 retention with bipap prn at night which she has not consistently required. There was concern for benzodiazepine withdrawal, however, psych has been consulted and is ongoing eval with plan to continue to hold all of her home psych meds pending neuro improvements as she remains lethargic at times. Today she has remained alert for most of the day. She has failed speech and swallow the past 2 days, with passing bedside nursing eval today and refusing replacement of NGT for feeds. Speech and swallow to see her again tomorrow morning 12/5. ICU stay has been complicated by persistent afib w rvr, on digoxin.         A/P  Acute metabolic Encephalopathy.  - Pt is somnolent, easily aroused   - secondary to PNA.  - supportive care.    Patient is legally blind as per family.  - and she usually signed her name with an "X" as per daughter.    S/P Acute hypoxic Respiratory failure.  extubated on 12/2 and has been without respiratory distress  Left lower lobe pneumonia.  Staph Aureus in Sputum.  - Suspect COPD.  - On BIPAP as needed.  - Continue O2, nebulizer.  - continue IV antibiotics.  - Aspiration precautions.  - pulmonary is following.    Chronic Atrial Fibrillation.  - she was on digoxin, now off.  - continue current meds.  - On full dose Lovenox for A Fib.    Frequent falls from unsteady gait.  - PT  - fall precautions    Dysphagia.  - swallow eval noted, recs puree diet.    dementia  - Somnolent  - HOLD all psychiatric medications at this time as per psych  - supportive care  - psych is following.    severe OA  - pain meds    HTN   - will monitor    DM   - A1C  - ISS    PT eval noted:   Patient has own hospital bed, fransico lift and wheelchair.                           the recommend home; no skilled PT needs.    SUPPORTIVE CARE

## 2019-12-09 LAB
ANION GAP SERPL CALC-SCNC: 7 MMOL/L — SIGNIFICANT CHANGE UP (ref 5–17)
ANION GAP SERPL CALC-SCNC: 7 MMOL/L — SIGNIFICANT CHANGE UP (ref 5–17)
BUN SERPL-MCNC: 14 MG/DL — SIGNIFICANT CHANGE UP (ref 7–23)
BUN SERPL-MCNC: 14 MG/DL — SIGNIFICANT CHANGE UP (ref 7–23)
CALCIUM SERPL-MCNC: 8.9 MG/DL — SIGNIFICANT CHANGE UP (ref 8.5–10.1)
CALCIUM SERPL-MCNC: 9.2 MG/DL — SIGNIFICANT CHANGE UP (ref 8.5–10.1)
CHLORIDE SERPL-SCNC: 105 MMOL/L — SIGNIFICANT CHANGE UP (ref 96–108)
CHLORIDE SERPL-SCNC: 109 MMOL/L — HIGH (ref 96–108)
CO2 SERPL-SCNC: 29 MMOL/L — SIGNIFICANT CHANGE UP (ref 22–31)
CO2 SERPL-SCNC: 30 MMOL/L — SIGNIFICANT CHANGE UP (ref 22–31)
CREAT SERPL-MCNC: 0.27 MG/DL — LOW (ref 0.5–1.3)
CREAT SERPL-MCNC: 0.33 MG/DL — LOW (ref 0.5–1.3)
GLUCOSE BLDC GLUCOMTR-MCNC: 129 MG/DL — HIGH (ref 70–99)
GLUCOSE BLDC GLUCOMTR-MCNC: 135 MG/DL — HIGH (ref 70–99)
GLUCOSE BLDC GLUCOMTR-MCNC: 135 MG/DL — HIGH (ref 70–99)
GLUCOSE BLDC GLUCOMTR-MCNC: 158 MG/DL — HIGH (ref 70–99)
GLUCOSE BLDC GLUCOMTR-MCNC: 161 MG/DL — HIGH (ref 70–99)
GLUCOSE SERPL-MCNC: 135 MG/DL — HIGH (ref 70–99)
GLUCOSE SERPL-MCNC: 141 MG/DL — HIGH (ref 70–99)
HCT VFR BLD CALC: 38.7 % — SIGNIFICANT CHANGE UP (ref 34.5–45)
HGB BLD-MCNC: 11.9 G/DL — SIGNIFICANT CHANGE UP (ref 11.5–15.5)
MAGNESIUM SERPL-MCNC: 2 MG/DL — SIGNIFICANT CHANGE UP (ref 1.6–2.6)
MCHC RBC-ENTMCNC: 28 PG — SIGNIFICANT CHANGE UP (ref 27–34)
MCHC RBC-ENTMCNC: 30.7 GM/DL — LOW (ref 32–36)
MCV RBC AUTO: 91.1 FL — SIGNIFICANT CHANGE UP (ref 80–100)
NRBC # BLD: 0 /100 WBCS — SIGNIFICANT CHANGE UP (ref 0–0)
PHOSPHATE SERPL-MCNC: 3.5 MG/DL — SIGNIFICANT CHANGE UP (ref 2.5–4.5)
PLATELET # BLD AUTO: 330 K/UL — SIGNIFICANT CHANGE UP (ref 150–400)
POTASSIUM SERPL-MCNC: 2.9 MMOL/L — CRITICAL LOW (ref 3.5–5.3)
POTASSIUM SERPL-MCNC: 5.4 MMOL/L — HIGH (ref 3.5–5.3)
POTASSIUM SERPL-SCNC: 2.9 MMOL/L — CRITICAL LOW (ref 3.5–5.3)
POTASSIUM SERPL-SCNC: 5.4 MMOL/L — HIGH (ref 3.5–5.3)
RBC # BLD: 4.25 M/UL — SIGNIFICANT CHANGE UP (ref 3.8–5.2)
RBC # FLD: 14.8 % — HIGH (ref 10.3–14.5)
SODIUM SERPL-SCNC: 142 MMOL/L — SIGNIFICANT CHANGE UP (ref 135–145)
SODIUM SERPL-SCNC: 145 MMOL/L — SIGNIFICANT CHANGE UP (ref 135–145)
WBC # BLD: 6.69 K/UL — SIGNIFICANT CHANGE UP (ref 3.8–10.5)
WBC # FLD AUTO: 6.69 K/UL — SIGNIFICANT CHANGE UP (ref 3.8–10.5)

## 2019-12-09 PROCEDURE — 99231 SBSQ HOSP IP/OBS SF/LOW 25: CPT

## 2019-12-09 PROCEDURE — 99233 SBSQ HOSP IP/OBS HIGH 50: CPT

## 2019-12-09 RX ORDER — POTASSIUM CHLORIDE 20 MEQ
10 PACKET (EA) ORAL
Refills: 0 | Status: COMPLETED | OUTPATIENT
Start: 2019-12-09 | End: 2019-12-09

## 2019-12-09 RX ORDER — FENTANYL CITRATE 50 UG/ML
1 INJECTION INTRAVENOUS
Refills: 0 | Status: DISCONTINUED | OUTPATIENT
Start: 2019-12-09 | End: 2019-12-11

## 2019-12-09 RX ORDER — GLUCAGON INJECTION, SOLUTION 0.5 MG/.1ML
1 INJECTION, SOLUTION SUBCUTANEOUS ONCE
Refills: 0 | Status: DISCONTINUED | OUTPATIENT
Start: 2019-12-09 | End: 2019-12-11

## 2019-12-09 RX ORDER — INSULIN LISPRO 100/ML
VIAL (ML) SUBCUTANEOUS AT BEDTIME
Refills: 0 | Status: DISCONTINUED | OUTPATIENT
Start: 2019-12-09 | End: 2019-12-11

## 2019-12-09 RX ORDER — POTASSIUM CHLORIDE 20 MEQ
40 PACKET (EA) ORAL EVERY 4 HOURS
Refills: 0 | Status: COMPLETED | OUTPATIENT
Start: 2019-12-09 | End: 2019-12-09

## 2019-12-09 RX ORDER — ACETAMINOPHEN 500 MG
650 TABLET ORAL EVERY 6 HOURS
Refills: 0 | Status: DISCONTINUED | OUTPATIENT
Start: 2019-12-09 | End: 2019-12-11

## 2019-12-09 RX ORDER — POTASSIUM CHLORIDE 20 MEQ
40 PACKET (EA) ORAL ONCE
Refills: 0 | Status: COMPLETED | OUTPATIENT
Start: 2019-12-09 | End: 2019-12-09

## 2019-12-09 RX ORDER — SODIUM CHLORIDE 9 MG/ML
1000 INJECTION, SOLUTION INTRAVENOUS
Refills: 0 | Status: DISCONTINUED | OUTPATIENT
Start: 2019-12-09 | End: 2019-12-11

## 2019-12-09 RX ORDER — INSULIN LISPRO 100/ML
VIAL (ML) SUBCUTANEOUS
Refills: 0 | Status: DISCONTINUED | OUTPATIENT
Start: 2019-12-09 | End: 2019-12-11

## 2019-12-09 RX ORDER — DEXTROSE 50 % IN WATER 50 %
12.5 SYRINGE (ML) INTRAVENOUS ONCE
Refills: 0 | Status: DISCONTINUED | OUTPATIENT
Start: 2019-12-09 | End: 2019-12-11

## 2019-12-09 RX ORDER — DEXTROSE 50 % IN WATER 50 %
15 SYRINGE (ML) INTRAVENOUS ONCE
Refills: 0 | Status: DISCONTINUED | OUTPATIENT
Start: 2019-12-09 | End: 2019-12-11

## 2019-12-09 RX ORDER — DEXTROSE 50 % IN WATER 50 %
25 SYRINGE (ML) INTRAVENOUS ONCE
Refills: 0 | Status: DISCONTINUED | OUTPATIENT
Start: 2019-12-09 | End: 2019-12-11

## 2019-12-09 RX ORDER — METOPROLOL TARTRATE 50 MG
12.5 TABLET ORAL
Refills: 0 | Status: DISCONTINUED | OUTPATIENT
Start: 2019-12-09 | End: 2019-12-11

## 2019-12-09 RX ADMIN — Medication 3 MILLILITER(S): at 00:44

## 2019-12-09 RX ADMIN — Medication 3 MILLILITER(S): at 11:01

## 2019-12-09 RX ADMIN — Medication 100 MILLIEQUIVALENT(S): at 10:04

## 2019-12-09 RX ADMIN — Medication 100 MILLIEQUIVALENT(S): at 11:38

## 2019-12-09 RX ADMIN — Medication 1000 MILLIGRAM(S): at 00:00

## 2019-12-09 RX ADMIN — Medication 3 MILLILITER(S): at 17:18

## 2019-12-09 RX ADMIN — FENTANYL CITRATE 1 PATCH: 50 INJECTION INTRAVENOUS at 18:43

## 2019-12-09 RX ADMIN — Medication 100 MILLIEQUIVALENT(S): at 12:35

## 2019-12-09 RX ADMIN — Medication 40 MILLIEQUIVALENT(S): at 07:28

## 2019-12-09 RX ADMIN — Medication 40 MILLIEQUIVALENT(S): at 10:23

## 2019-12-09 RX ADMIN — FENTANYL CITRATE 1 PATCH: 50 INJECTION INTRAVENOUS at 19:00

## 2019-12-09 RX ADMIN — MIDODRINE HYDROCHLORIDE 10 MILLIGRAM(S): 2.5 TABLET ORAL at 13:00

## 2019-12-09 RX ADMIN — Medication 2: at 12:35

## 2019-12-09 RX ADMIN — Medication 12.5 MILLIGRAM(S): at 18:09

## 2019-12-09 RX ADMIN — ENOXAPARIN SODIUM 80 MILLIGRAM(S): 100 INJECTION SUBCUTANEOUS at 06:09

## 2019-12-09 RX ADMIN — Medication 40 MILLIEQUIVALENT(S): at 10:04

## 2019-12-09 RX ADMIN — Medication 3 MILLILITER(S): at 06:36

## 2019-12-09 RX ADMIN — ENOXAPARIN SODIUM 80 MILLIGRAM(S): 100 INJECTION SUBCUTANEOUS at 18:09

## 2019-12-09 NOTE — SWALLOW BEDSIDE ASSESSMENT ADULT - H & P REVIEW
75 yo F bibems for acute resp failure.  Pt. has been reportedly sob and altered for 3 days.  Patient can not give further history due to condition.  Noted to be hypoxic  and blue colored.  No other complaints or inciting event.  No family at bedside now. Disoriented for last few days 4 days, sleeping, coughing with mucous and congestion, can't swallow pills.  Pt. has 24 hour HHA, lives with son. As per son increased secretions last few day, Increased lethargy.  Multiple visits in last month for UTI.  In ER noted to be hypoxic requiring intubation. Received 3 liter IV fluid Lac[place] 2.3.  ICU called for evaluation and management. pt intubated on 11/25/2019 and extubated 12/2/2019/yes
75 yo F bibems for acute resp failure.  Pt. has been reportedly sob and altered for 3 days.  Patient can not give further history due to condition.  Noted to be hypoxic  and blue colored.  No other complaints or inciting event.  No family at bedside now. Disoriented for last few days 4 days, sleeping, coughing with mucous and congestion, can't swallow pills.  Pt. has 24 hour HHA, lives with son. As per son increased secretions last few day, Increased lethargy.  Multiple visits in last month for UTI.  In ER noted to be hypoxic requiring intubation. Received 3 liter IV fluid Lacate 2.3.  ICU called for evaluation and management. pt intubated on 11/25/2019 and extubated 12/2/2019/yes
yes/75 yo F bibems for acute resp failure.  Pt. has been reportedly sob and altered for 3 days.  Patient can not give further history due to condition.  Noted to be hypoxic  and blue colored.  No other complaints or inciting event.  No family at bedside now. Disoriented for last few days 4 days, sleeping, coughing with mucous and congestion, can't swallow pills.  Pt. has 24 hour HHA, lives with son. As per son increased secretions last few day, Increased lethargy.  Multiple visits in last month for UTI.  In ER noted to be hypoxic requiring intubation. Received 3 liter IV fluid Lacate 2.3.  ICU called for evaluation and management. pt intubated on 11/25/2019 and extubated 12/2/2019
73 yo F bibems for acute resp failure.  Pt. has been reportedly sob and altered for 3 days prior to hospitalization.  Patient can not give further history due to condition.  Noted to be hypoxic  and blue colored.  No other complaints or inciting event.  No family at bedside now. Disoriented for last few days 4 days, sleeping, coughing with mucous and congestion, can't swallow pills.  Pt. has 24 hour HHA, lives with son. As per son increased secretions last few day, Increased lethargy.  Multiple visits in last month for UTI.  In ER noted to be hypoxic requiring intubation. Received 3 liter IV fluid Lacate 2.3.  ICU called for evaluation and management. pt intubated on 11/25/2019 and extubated 12/2/2019/yes

## 2019-12-09 NOTE — PROGRESS NOTE ADULT - SUBJECTIVE AND OBJECTIVE BOX
INTERVAL HPI:  74 year female with HTN, Recently diagnosed DM, Chronic A Fib, TIA/ CVA, Unsteady gait with frequent falls, suspected COPD and dementia.  50 pack year history of smoking, quit 2 years ago. Has been on as needed bronchodilators.  Son reports that she was feeling more than normal cold for about a week, then developed sob along with cough the night before her presentation. Brought in with acute Respiratory distress and altered mental status.  Got intubated in ED for acute hypoxic Respiratory failure secondary to pneumonia.  Got successfully extubated, now on nasal O2.  12/04/19: Out of ICU.    OVERNIGHT EVENTS:  No significant change.    Vital Signs Last 24 Hrs  T(C): 37 (09 Dec 2019 11:25), Max: 37.1 (09 Dec 2019 04:40)  T(F): 98.6 (09 Dec 2019 11:25), Max: 98.8 (09 Dec 2019 04:40)  HR: 121 (09 Dec 2019 13:12) (85 - 127)  BP: 127/77 (09 Dec 2019 11:25) (127/77 - 134/71)  BP(mean): --  RR: 19 (09 Dec 2019 11:25) (16 - 19)  SpO2: 94% (09 Dec 2019 13:12) (93% - 98%)    PHYSICAL EXAM:  GEN:        comfortable.  HEENT:    Normal.    RESP:         no distress.  CVS:          Regular rate and rhythm.   ABD:         Soft, non-tender, non-distended;     MEDICATIONS  (STANDING):  albuterol/ipratropium for Nebulization 3 milliLiter(s) Nebulizer every 6 hours  dextrose 5%. 1000 milliLiter(s) (50 mL/Hr) IV Continuous <Continuous>  dextrose 50% Injectable 12.5 Gram(s) IV Push once  dextrose 50% Injectable 25 Gram(s) IV Push once  dextrose 50% Injectable 25 Gram(s) IV Push once  enoxaparin Injectable 80 milliGRAM(s) SubCutaneous every 12 hours  fentaNYL   Patch  12 MICROgram(s)/Hr 1 Patch Transdermal every 72 hours  insulin lispro (HumaLOG) corrective regimen sliding scale   SubCutaneous every 6 hours  metoprolol succinate ER 12.5 milliGRAM(s) Oral two times a day  midodrine 10 milliGRAM(s) Oral every 8 hours  polyethylene glycol 3350 17 Gram(s) Oral daily    MEDICATIONS  (PRN):  acetaminophen   Tablet .. 650 milliGRAM(s) Oral every 6 hours PRN Temp greater or equal to 38C (100.4F), Mild Pain (1 - 3)  dextrose 40% Gel 15 Gram(s) Oral once PRN Blood Glucose LESS THAN 70 milliGRAM(s)/deciliter  glucagon  Injectable 1 milliGRAM(s) IntraMuscular once PRN Glucose LESS THAN 70 milligrams/deciliter    LABS:                        11.9   6.69  )-----------( 330      ( 09 Dec 2019 06:34 )             38.7     12-09    145  |  109<H>  |  14  ----------------------------<  135<H>  5.4<H>   |  29  |  0.33<L>    Ca    9.2      09 Dec 2019 13:15  Phos  3.5     12-09  Mg     2.0     12-09 12-06 @ 12:45  pH: 7.46  pCO2: 38  pO2: 77  SaO2: 96  12-03 @ 12:10  pH: 7.49  pCO2: 40  pO2: 73  SaO2: 95    ASSESSMENT AND PLAN:  ·	S/P Acute hypoxic Respiratory failure  ·	Left lower lobe pneumonia.  ·	Staph Aureus in Sputum  ·	Acute metabolic Encephalopathy.  ·	Chronic Atrial Fibrillation.  ·	Suspect COPD.  ·	HTN by history.  ·	DM by history.  ·	Frequent falls from unsteady gait.  ·	Dysphagia.    Continue O2 and nebulizer.  Suction as needed.

## 2019-12-09 NOTE — SWALLOW BEDSIDE ASSESSMENT ADULT - SWALLOW EVAL: DIAGNOSIS
Pt presents with oropharyngeal dysphagia marked by inadequate mastication, reduced bolus formation/manipulation, decreased AP transport, delayed oral transit time; however, pt noted with improvement since previous evaluation (12/6). Pt with suspected delay in pharyngeal swallow trigger (2-4 seconds) and reduced laryngeal elevation across all PO trials. Pt with no overt s/s of aspiration/penetration with puree solids, honey thick and nectar thick liquids. Pt with reflexive cough with thin liquids and soft solids after the swallow.

## 2019-12-09 NOTE — PROGRESS NOTE BEHAVIORAL HEALTH - NSBHCHARTREVIEWLAB_PSY_A_CORE FT
12-09    142  |  105  |  14  ----------------------------<  141<H>  2.9<LL>   |  30  |  0.27<L>    Ca    8.9      09 Dec 2019 06:34  Phos  3.5     12-09  Mg     2.0     12-09

## 2019-12-09 NOTE — SWALLOW BEDSIDE ASSESSMENT ADULT - SLP PRECAUTIONS/LIMITATIONS: HEARING
? [place] vs reduced cognition
? [place] vs reduced cognition
impaired/increased volume required during simple discourse and wh- questions
increased volume required during simple discourse and wh- questions/impaired

## 2019-12-09 NOTE — PROGRESS NOTE BEHAVIORAL HEALTH - NSBHCONSULTFOLLOWAFTERCARE_PSY_A_CORE FT
cleared for rehab transfer where medications can be managed by any MD. Recommending for future use: risperdal 0.5mg PO BID PRN for agitation; trazodone 50mg PO qhs PRN for insomnia cleared for discharge; medications can be managed by any MD. Recommending for future use: risperdal 0.5mg PO BID PRN for agitation; trazodone 50mg PO qhs PRN for insomnia

## 2019-12-09 NOTE — SWALLOW BEDSIDE ASSESSMENT ADULT - ORAL PHASE
Decreased anterior-posterior movement of the bolus/Delayed oral transit time Delayed oral transit time/Decreased anterior-posterior movement of the bolus Decreased anterior-posterior movement of the bolus/Delayed oral transit time/Stasis in lateral sulci/Lingual stasis

## 2019-12-09 NOTE — PROGRESS NOTE ADULT - SUBJECTIVE AND OBJECTIVE BOX
Patient is a 74y old  Female who presents with a chief complaint of hypoxia with acute respiratory failure (07 Dec 2019 15:08), she is in NAD.      SUBJECTIVE: Pt seen and examined at bedside. Lenghty discussion with daughter and sister. Family feels pt should be discharged as there is no immediate need for hospitalization and nothing further is being done. Pts family agreeable to palliative consult for maximizing comfort measures. Family also wants reevaluation with speech and swallow. ROS unobtainable.     T(C): 37 (12-09-19 @ 11:25), Max: 37.1 (12-09-19 @ 04:40)  HR: 121 (12-09-19 @ 13:12) (85 - 127)  BP: 127/77 (12-09-19 @ 11:25) (127/77 - 134/71)  RR: 19 (12-09-19 @ 11:25) (16 - 19)  SpO2: 94% (12-09-19 @ 13:12) (93% - 98%)    MEDICATIONS  (STANDING):  albuterol/ipratropium for Nebulization 3 milliLiter(s) Nebulizer every 6 hours  dextrose 5%. 1000 milliLiter(s) (50 mL/Hr) IV Continuous <Continuous>  dextrose 50% Injectable 12.5 Gram(s) IV Push once  dextrose 50% Injectable 25 Gram(s) IV Push once  dextrose 50% Injectable 25 Gram(s) IV Push once  enoxaparin Injectable 80 milliGRAM(s) SubCutaneous every 12 hours  insulin lispro (HumaLOG) corrective regimen sliding scale   SubCutaneous every 6 hours  midodrine 10 milliGRAM(s) Oral every 8 hours  polyethylene glycol 3350 17 Gram(s) Oral daily    MEDICATIONS  (PRN):  acetaminophen   Tablet .. 650 milliGRAM(s) Oral every 6 hours PRN Temp greater or equal to 38C (100.4F)  acetaminophen  Suppository .. 650 milliGRAM(s) Rectal every 6 hours PRN Moderate Pain (4 - 6)  dextrose 40% Gel 15 Gram(s) Oral once PRN Blood Glucose LESS THAN 70 milliGRAM(s)/deciliter  glucagon  Injectable 1 milliGRAM(s) IntraMuscular once PRN Glucose LESS THAN 70 milligrams/deciliter      LABS:                        11.9   6.69  )-----------( 330      ( 09 Dec 2019 06:34 )             38.7   12-09    145  |  109<H>  |  14  ----------------------------<  135<H>  5.4<H>   |  29  |  0.33<L>    Ca    9.2      09 Dec 2019 13:15  Phos  3.5     12-09  Mg     2.0     12-09      Basic Metabolic Panel (12.09.19 @ 13:15)    Glucose, Serum: 135 mg/dL

## 2019-12-09 NOTE — SWALLOW BEDSIDE ASSESSMENT ADULT - SWALLOW EVAL: ORAL MUSCULATURE
unable to assess due to poor participation/comprehension
unable to assess due to poor participation/comprehension
unable to assess due to poor participation/comprehension/general observation revealed decreased lingual/labial/buccal strength, ROM and coordination
unable to assess due to poor participation/comprehension/general observation revealed decreased lingual/labial/buccal strength, ROM and coordination

## 2019-12-09 NOTE — PROGRESS NOTE BEHAVIORAL HEALTH - NSBHCHARTREVIEWVS_PSY_A_CORE FT
T(C): 37 (12-09-19 @ 11:25), Max: 37.1 (12-09-19 @ 04:40)  HR: 127 (12-09-19 @ 11:25) (85 - 127)  BP: 127/77 (12-09-19 @ 11:25) (127/77 - 134/71)  RR: 19 (12-09-19 @ 11:25) (16 - 19)  SpO2: 95% (12-09-19 @ 11:25) (93% - 98%)

## 2019-12-09 NOTE — PROGRESS NOTE BEHAVIORAL HEALTH - AXIS III
Afib; COPD; dementia ; DM (diabetes mellitus); HTN (hypertension); Recurrent UTIs; cataracts, vision deficits, retinal detachment, severe OA of the left knee, spinal spondylosis

## 2019-12-09 NOTE — PROGRESS NOTE ADULT - ASSESSMENT
75 yo female with PMH dementia, depression, long standing benzo use, afib, HTN, COPD, DM who presented to [facility] with lethargy, AMS, dyspnea and cough on 11/25. She was intubated for acute hypoxic respiratory failure and was found to have pna, uti, and septic shock. Initially during her ICU course she was vasopressor dependant, however was successfully weaned off with use of midodrine. She was extubated on 12/2 and has been without respiratory distress, hypoxia or co2 retention with bipap prn at night which she has not consistently required. There was concern for benzodiazepine withdrawal, however, psych has been consulted and is ongoing eval with plan to continue to hold all of her home psych meds pending neuro improvements as she remains lethargic at times. ICU stay has been complicated by persistent afib w rvr, on digoxin.  Today family is requesting for re eval of speech and swallow and agreeable to Palliative consult as pts family feels she should leave hospital and have maximized comfort and quality measures.          A/P  Acute metabolic Encephalopathy.  - Pt is somnolent, easily aroused and improving.   - secondary to PNA.  - supportive care.  - Pt may benefit from Palliative consult. Family agreeable. F/U Palliative consult recs    Patient is legally blind as per family.  - and she usually signed her name with an "X" as per daughter.    S/P Acute hypoxic Respiratory failure.  extubated on 12/2 and has been without respiratory distress  Left lower lobe pneumonia.  Staph Aureus in Sputum.  - Suspect COPD.  - D/C BIPAP  - Continue O2 and wean off today, nebulizer.  - continue IV antibiotics.  - Aspiration precautions.  - pulmonary is following.    Chronic Atrial Fibrillation.  - she was on digoxin, now off.  - continue current meds.  - On full dose Lovenox for A Fib.    Frequent falls from unsteady gait.  - PT  - fall precautions    Dysphagia.  - Previous swallow eval noted, recs puree diet.  - Family requests reeval today.     Dementia  - Somnolent  - HOLD all psychiatric medications at this time as per psych  - supportive care  - psych is following.    severe OA  - Will begin fentanyl patch    HTN   - will monitor    DM   - A1C  - ISS    PT eval noted:   Patient has own hospital bed, fransico lift and wheelchair.                           the recommend home; no skilled PT needs.      DISPO:Family wants pt to be discharged as they dont feel she is getting the best quality of care. Agreed to palliative consult. Will switch IV meds to oral and crushed as family states pt is tolerating PO. Will request reeaval with speech and swallow. LOS 48 hours pending palliative. 75 yo female with PMH dementia, depression, long standing benzo use, afib, HTN, COPD, DM who presented to [facility] with lethargy, AMS, dyspnea and cough on 11/25. She was intubated for acute hypoxic respiratory failure and was found to have pna, uti, and septic shock. Initially during her ICU course she was vasopressor dependant, however was successfully weaned off with use of midodrine. She was extubated on 12/2 and has been without respiratory distress, hypoxia or co2 retention with bipap prn at night which she has not consistently required. There was concern for benzodiazepine withdrawal, however, psych has been consulted and is ongoing eval with plan to continue to hold all of her home psych meds pending neuro improvements as she remains lethargic at times. ICU stay has been complicated by persistent afib w rvr, on digoxin.  Today family is requesting for re eval of speech and swallow and agreeable to Palliative consult as pts family feels she should leave hospital and have maximized comfort and quality measures.          A/P  Problem/Plan: 1  Problem: Acute metabolic Encephalopathy.  - likely multifactorial and  due to polypharmacy with secondary to PNA.  - supportive care.  - Pt may benefit from Palliative consult. Family agreeable. F/U Palliative consult recs    Patient is legally blind as per family.  - and she usually signed her name with an "X" as per daughter.    Problem/Plan: 2  Problem: S/P Acute hypoxic Respiratory failure.  extubated on 12/2 and has been without respiratory distress  Left lower lobe pneumonia.  Staph Aureus in Sputum.  - Suspect COPD.  - D/C BIPAP  - Continue O2 and wean off today, nebulizer.  - continue IV antibiotics.  - Aspiration precautions.  - pulmonary is following.    Problem/Plan: 3  Problem: Chronic Atrial Fibrillation.  - starting low dose BB  - continue current meds.  - On full dose Lovenox for A Fib.    Problem/Plan: 4  Problem: Frequent falls from unsteady gait.  - PT  - fall precautions    Problem/Plan: 5  Problem: Dysphagia.  - Previous swallow eval noted, recs puree diet with nectar thick    Problem/Plan: 6  Problem: Dementia  - Somnolent  - HOLD all psychiatric medications at this time as per psych  - supportive care  - psych is following.    Problem/Plan: 7  Problem: Severe Osteoarthritis  - Will begin fentanyl patch    Problem/Plan: 8  Problem: HTN   - will monitor    Proble/Plan: 9  Problem: DM   - A1C  - ISS    PT eval noted:   Patient has own hospital bed, fransico lift and wheelchair.                           the recommend home; no skilled PT needs.      DISPO:Family wants pt to be discharged as they dont feel she is getting the best quality of care. Agreed to palliative consult. Will switch IV meds to oral and crushed as family states pt is tolerating PO. Will request reeaval with speech and swallow. LOS 48 hours pending palliative.

## 2019-12-09 NOTE — SWALLOW BEDSIDE ASSESSMENT ADULT - SLP PERTINENT HISTORY OF CURRENT PROBLEM
swallow evals completed 12/3/2019, 12/4/2019 at which time NPO was recommended and 12/6/2019, recommending puree solids/no liquids. see reports for details.

## 2019-12-09 NOTE — SWALLOW BEDSIDE ASSESSMENT ADULT - NS ASR SWALLOW FINDINGS DISCUS
Patient/Nursing
Nursing/Patient
Physician/Patient/pts son and daughter/Family
pts daughter/Nursing/Family

## 2019-12-09 NOTE — PROGRESS NOTE BEHAVIORAL HEALTH - ORIENTATION OTHER
unable to ascertain at this time

## 2019-12-09 NOTE — PROGRESS NOTE BEHAVIORAL HEALTH - NSBHCONSFOLLOWNEEDS_PSY_A_CORE
Patient needs further psychiatric safety assessment prior to discharge
no psychiatric contraindications to discharge
Patient needs further psychiatric safety assessment prior to discharge
Patient needs further psychiatric safety assessment prior to discharge
no psychiatric contraindications to discharge

## 2019-12-09 NOTE — PROGRESS NOTE BEHAVIORAL HEALTH - PRIMARY DX
Delirium secondary to multiple medical problems

## 2019-12-09 NOTE — PROGRESS NOTE BEHAVIORAL HEALTH - NSBHCONSULTMEDS_PSY_A_CORE FT
Pt is still somnolent so continue to HOLD all psychiatric medications at this time - less is more / minimize any sedating side effects at this time to increase chances of successful extubation   - will gently reintroduce in the next few days depending on how patient is doing
Pt has been overall weak, somnolent, unable to meaningfully engage in any conversation / communication since extubation
Pt is still somnolent so continue to HOLD all psychiatric medications at this time - less is more / minimize any sedating side effects at this time to increase chances of successful extubation   - will gently reintroduce in the next few days (if needed) depending on how patient is doing  - continue IV hydration as long as Patient has minimal PO intake
Pt is still somnolent so continue to HOLD all psychiatric medications at this time - less is more / minimize any sedating side effects at this time to increase chances of successful extubation   - will gently reintroduce in the next few days depending on how patient is doing
Pt is still somnolent so continue to HOLD all psychiatric medications at this time - less is more / minimize any sedating side effects at this time to increase chances of successful extubation   - will gently reintroduce in the next few days depending on how patient is doing  - continue IV hydration as long as Patient has minimal PO intake

## 2019-12-09 NOTE — SWALLOW BEDSIDE ASSESSMENT ADULT - PHARYNGEAL PHASE
Decreased laryngeal elevation/2 second delay in swallow trigger/Delayed pharyngeal swallow 2-4 second delay in swallow trigger/Delayed pharyngeal swallow/Decreased laryngeal elevation 2 second delay in pharyngeal swallow trigger/Delayed pharyngeal swallow/Decreased laryngeal elevation cough x3/5 trials/Cough post oral intake/Delayed pharyngeal swallow/Decreased laryngeal elevation Multiple swallows/Delayed pharyngeal swallow/x3 per bolus/Cough post oral intake/Decreased laryngeal elevation

## 2019-12-09 NOTE — SWALLOW BEDSIDE ASSESSMENT ADULT - ASR SWALLOW ASPIRATION MONITOR
throat clearing/change of breathing pattern/cough/gurgly voice/oral hygiene/position upright (90Y)
position upright (90Y)/cough/throat clearing/gurgly voice

## 2019-12-09 NOTE — SWALLOW BEDSIDE ASSESSMENT ADULT - NS SPL SWALLOW CLINIC TRIAL FT
oral residue cleared by SLP
Pt with clear vocal quality s/p each puree trial and no overt s/s of aspiration/penetration.
Pt with clear vocal quality s/p each puree trial and no overt s/s of aspiration/penetration.

## 2019-12-09 NOTE — PROGRESS NOTE BEHAVIORAL HEALTH - RISK ASSESSMENT
low risk for intentional, planned out and executed harm to self or others at this time given advanced age, physical frailty and current confusion. More likely to unintentionally/accidentally lash out at caretakers during ADL assistance or hurt self by trying to climb out of bed/pulls lines etc secondary to her confused state.

## 2019-12-09 NOTE — PROGRESS NOTE BEHAVIORAL HEALTH - SUMMARY
Paxil is associated with withdrawal symptoms. The intensity of the withdrawal clinical signs depends on the daily dose and length of use. Main signs are dizziness, vertigo, headache, nausea, and flu-like symptoms as well as anxiety, confusion, irritability, excessive dreaming and insomnia.  - unlikely that Paxil withdrawal is the case here; especially that she has been here since 11/25/19 so whatever withdrawal sxs she had was masked/not felt by sedation/intubation etc so not distressing  - difficulty with extubation likely the synergistic result of age, pre-existing impaired brain (see CT head; previous dementia diagnosis), cognitive decline/dementia, multiple medical conditions including infection/sepsis, respiratory failure w/ intubation
- initial difficulty with extubation likely the synergistic result of age, pre-existing impaired brain (see CT head; previous dementia diagnosis), cognitive decline/dementia, multiple medical conditions including infection/sepsis, respiratory failure w/ intubation  - recovery has been and will continue to be slow (weeks to even months)  - there is not much else/more Psychiatry can offer at this time
- difficulty with extubation likely the synergistic result of age, pre-existing impaired brain (see CT head; previous dementia diagnosis), cognitive decline/dementia, multiple medical conditions including infection/sepsis, respiratory failure w/ intubation  - recovery will be slow
- difficulty with extubation likely the synergistic result of age, pre-existing impaired brain (see CT head; previous dementia diagnosis), cognitive decline/dementia, multiple medical conditions including infection/sepsis, respiratory failure w/ intubation  - recovery will be slow
Paxil is associated with withdrawal symptoms. The intensity of the withdrawal clinical signs depends on the daily dose and length of use. Main signs are dizziness, vertigo, headache, nausea, and flu-like symptoms as well as anxiety, confusion, irritability, excessive dreaming and insomnia.  - unlikely that Paxil withdrawal is the case here; especially that she has been here since 11/25/19 so whatever withdrawal sxs she had was masked/not felt by sedation/intubation etc so not distressing  - difficulty with extubation likely the synergistic result of age, pre-existing impaired brain (see CT head; previous dementia diagnosis), cognitive decline/dementia, multiple medical conditions including infection/sepsis, respiratory failure w/ intubation

## 2019-12-09 NOTE — SWALLOW BEDSIDE ASSESSMENT ADULT - ORAL PREPARATORY PHASE
Reduced oral grading Anterior loss of bolus/Reduced oral grading Decreased mastication ability/Reduced oral grading

## 2019-12-09 NOTE — SWALLOW BEDSIDE ASSESSMENT ADULT - COMMENTS
CT Head No Cont 11/25/2019 IMPRESSION: No acute intracranial hemorrhage, mass effect, or shift of the midline structures.  CXR 11/28/2019 Impression: Pulmonary vascular congestion. Left retrocardiac opacity with air bronchograms Pt with clear vocal quality s/p each HTL trial and no overt s/s of aspiration/penetration. Pt with clear vocal quality s/p each NTL trial and no overt s/s of aspiration/penetration. Pt with reflexive cough in between swallows of soft solid trials and post oral intake.

## 2019-12-09 NOTE — PROGRESS NOTE BEHAVIORAL HEALTH - NSBHLOC_PSY_A_CORE
Lethargic, arousable to verbal stimulus
Other
Other
Lethargic, arousable to verbal stimulus
Lethargic, arousable to verbal stimulus

## 2019-12-09 NOTE — PROGRESS NOTE BEHAVIORAL HEALTH - NSBHFUPINTERVALHXFT_PSY_A_CORE
No significant change to psychiatric clinical presentation. Physically/medically speaking, highly likely Patient will face a long convalescence / recovery (weeks to even months). No episodes of agitation or aggressive behavior and has not needed any PRNs. No significant change to psychiatric clinical presentation - slight improvement in eye contact (mainly focus but does not maintain eye contact) and says "what" and not just general moaning. Still not able to meaningfully engage. Physically/medically speaking, highly likely Patient will face a long convalescence / recovery (weeks to even months). No episodes of agitation or aggressive behavior and has not needed any PRNs. No significant change to psychiatric clinical presentation - slight improvement in eye contact (mainly focus but does not maintain eye contact) and says "what" and not just general moaning. (daughter at bedside claims Patient has episodes where she is more verbal) Still not able to meaningfully engage. Physically/medically speaking, highly likely Patient will face a long convalescence / recovery (weeks to even months). No episodes of agitation or aggressive behavior and has not needed any PRNs.

## 2019-12-09 NOTE — SWALLOW BEDSIDE ASSESSMENT ADULT - SWALLOW EVAL: RECOMMENDED FEEDING/EATING TECHNIQUES
check mouth frequently for oral residue/pocketing/crush medication (when feasible)/small sips/bites/allow for swallow between intakes/alternate food with liquid/position upright (90 degrees)/maintain upright posture during/after eating for 30 mins
Pt noted with decreased activity tolerance as evaluation progressed. Allow time between intake. Double swallow when stasis noted./allow for swallow between intakes/small sips/bites/maintain upright posture during/after eating for 30 mins/oral hygiene/check mouth frequently for oral residue/pocketing/position upright (90 degrees)

## 2019-12-10 DIAGNOSIS — R53.2 FUNCTIONAL QUADRIPLEGIA: ICD-10-CM

## 2019-12-10 DIAGNOSIS — J44.9 CHRONIC OBSTRUCTIVE PULMONARY DISEASE, UNSPECIFIED: ICD-10-CM

## 2019-12-10 DIAGNOSIS — Z51.5 ENCOUNTER FOR PALLIATIVE CARE: ICD-10-CM

## 2019-12-10 LAB
GLUCOSE BLDC GLUCOMTR-MCNC: 136 MG/DL — HIGH (ref 70–99)
GLUCOSE BLDC GLUCOMTR-MCNC: 139 MG/DL — HIGH (ref 70–99)
GLUCOSE BLDC GLUCOMTR-MCNC: 148 MG/DL — HIGH (ref 70–99)
GLUCOSE BLDC GLUCOMTR-MCNC: 152 MG/DL — HIGH (ref 70–99)

## 2019-12-10 PROCEDURE — 99223 1ST HOSP IP/OBS HIGH 75: CPT

## 2019-12-10 PROCEDURE — 99233 SBSQ HOSP IP/OBS HIGH 50: CPT

## 2019-12-10 RX ORDER — FENTANYL CITRATE 50 UG/ML
1 INJECTION INTRAVENOUS
Qty: 10 | Refills: 0
Start: 2019-12-10 | End: 2020-01-08

## 2019-12-10 RX ORDER — METOPROLOL TARTRATE 50 MG
1 TABLET ORAL
Qty: 30 | Refills: 0
Start: 2019-12-10 | End: 2020-01-08

## 2019-12-10 RX ORDER — TRAZODONE HCL 50 MG
0 TABLET ORAL
Qty: 0 | Refills: 0 | DISCHARGE

## 2019-12-10 RX ORDER — METOPROLOL TARTRATE 50 MG
1 TABLET ORAL
Qty: 0 | Refills: 0 | DISCHARGE

## 2019-12-10 RX ORDER — RIVAROXABAN 15 MG-20MG
0 KIT ORAL
Qty: 0 | Refills: 0 | DISCHARGE

## 2019-12-10 RX ORDER — METFORMIN HYDROCHLORIDE 850 MG/1
1 TABLET ORAL
Qty: 60 | Refills: 1
Start: 2019-12-10 | End: 2020-02-07

## 2019-12-10 RX ORDER — RISPERIDONE 4 MG/1
1 TABLET ORAL
Qty: 60 | Refills: 0
Start: 2019-12-10 | End: 2020-01-08

## 2019-12-10 RX ORDER — DAPAGLIFLOZIN 10 MG/1
1 TABLET, FILM COATED ORAL
Qty: 30 | Refills: 1
Start: 2019-12-10 | End: 2020-02-07

## 2019-12-10 RX ORDER — ALBUTEROL 90 UG/1
3 AEROSOL, METERED ORAL
Qty: 1200 | Refills: 1
Start: 2019-12-10 | End: 2020-02-07

## 2019-12-10 RX ORDER — RIVAROXABAN 15 MG-20MG
1 KIT ORAL
Qty: 30 | Refills: 0
Start: 2019-12-10 | End: 2020-01-08

## 2019-12-10 RX ORDER — DAPAGLIFLOZIN 10 MG/1
1 TABLET, FILM COATED ORAL
Qty: 0 | Refills: 0 | DISCHARGE

## 2019-12-10 RX ORDER — ALBUTEROL 90 UG/1
3 AEROSOL, METERED ORAL
Qty: 0 | Refills: 0 | DISCHARGE

## 2019-12-10 RX ORDER — RIVAROXABAN 15 MG-20MG
1 KIT ORAL
Qty: 0 | Refills: 0 | DISCHARGE
Start: 2019-12-10

## 2019-12-10 RX ORDER — RISPERIDONE 4 MG/1
1 TABLET ORAL
Qty: 0 | Refills: 0 | DISCHARGE

## 2019-12-10 RX ORDER — POLYETHYLENE GLYCOL 3350 17 G/17G
17 POWDER, FOR SOLUTION ORAL
Qty: 30 | Refills: 0
Start: 2019-12-10 | End: 2020-01-08

## 2019-12-10 RX ORDER — ALBUTEROL 90 UG/1
2 AEROSOL, METERED ORAL
Qty: 1 | Refills: 0
Start: 2019-12-10 | End: 2020-01-08

## 2019-12-10 RX ORDER — METFORMIN HYDROCHLORIDE 850 MG/1
1 TABLET ORAL
Qty: 0 | Refills: 0 | DISCHARGE

## 2019-12-10 RX ORDER — DIAZEPAM 5 MG
0 TABLET ORAL
Qty: 0 | Refills: 0 | DISCHARGE

## 2019-12-10 RX ORDER — ROSUVASTATIN CALCIUM 5 MG/1
1 TABLET ORAL
Qty: 30 | Refills: 0
Start: 2019-12-10 | End: 2020-01-08

## 2019-12-10 RX ORDER — ROSUVASTATIN CALCIUM 5 MG/1
1 TABLET ORAL
Qty: 0 | Refills: 0 | DISCHARGE

## 2019-12-10 RX ORDER — IBUPROFEN 200 MG
1 TABLET ORAL
Qty: 30 | Refills: 0
Start: 2019-12-10 | End: 2019-12-19

## 2019-12-10 RX ORDER — ALBUTEROL 90 UG/1
2 AEROSOL, METERED ORAL
Qty: 0 | Refills: 0 | DISCHARGE

## 2019-12-10 RX ADMIN — Medication 3 MILLILITER(S): at 23:30

## 2019-12-10 RX ADMIN — Medication 650 MILLIGRAM(S): at 19:00

## 2019-12-10 RX ADMIN — Medication 650 MILLIGRAM(S): at 18:06

## 2019-12-10 RX ADMIN — Medication 3 MILLILITER(S): at 17:48

## 2019-12-10 RX ADMIN — FENTANYL CITRATE 1 PATCH: 50 INJECTION INTRAVENOUS at 19:30

## 2019-12-10 RX ADMIN — Medication 2: at 11:11

## 2019-12-10 RX ADMIN — Medication 3 MILLILITER(S): at 05:17

## 2019-12-10 RX ADMIN — Medication 12.5 MILLIGRAM(S): at 17:09

## 2019-12-10 RX ADMIN — Medication 3 MILLILITER(S): at 11:00

## 2019-12-10 RX ADMIN — ENOXAPARIN SODIUM 80 MILLIGRAM(S): 100 INJECTION SUBCUTANEOUS at 17:09

## 2019-12-10 RX ADMIN — Medication 3 MILLILITER(S): at 00:17

## 2019-12-10 RX ADMIN — FENTANYL CITRATE 1 PATCH: 50 INJECTION INTRAVENOUS at 07:29

## 2019-12-10 RX ADMIN — ENOXAPARIN SODIUM 80 MILLIGRAM(S): 100 INJECTION SUBCUTANEOUS at 05:29

## 2019-12-10 RX ADMIN — Medication 12.5 MILLIGRAM(S): at 05:29

## 2019-12-10 NOTE — DISCHARGE NOTE PROVIDER - NSDCMRMEDTOKEN_GEN_ALL_CORE_FT
albuterol 2.5 mg/3 mL (0.083%) inhalation solution: 3 milliliter(s) inhaled every 6 hours  albuterol 90 mcg/inh inhalation aerosol: 2 puff(s) inhaled 4 times a day, As Needed   Crestor 10 mg oral tablet: 1 tab(s) orally once a day  Farxiga 5 mg oral tablet: 1 tab(s) orally once a day  fentaNYL 12 mcg/hr transdermal film, extended release: 1 patch transdermal every 72 hours MDD:1 patch  ibuprofen 600 mg oral tablet: 1 tab(s) orally every 8 hours, As Needed   metFORMIN 500 mg oral tablet: 1 tab(s) orally 2 times a day  polyethylene glycol 3350 oral powder for reconstitution: 17 gram(s) orally once a day  risperiDONE 0.5 mg oral tablet: 1 tab(s) orally 2 times a day, As Needed for severe agitation.  May take up to and no more than 4 times in 1 day. MDD:2 mg  Toprol-XL 25 mg oral tablet, extended release: 1 tab(s) orally once a day   Xarelto 20 mg oral tablet: 1 tab(s) orally once a day (in the evening) albuterol 2.5 mg/3 mL (0.083%) inhalation solution: 3 milliliter(s) inhaled every 6 hours  albuterol 90 mcg/inh inhalation aerosol: 2 puff(s) inhaled 4 times a day, As Needed   Crestor 10 mg oral tablet: 1 tab(s) orally once a day  Farxiga 5 mg oral tablet: 1 tab(s) orally once a day  fentaNYL 12 mcg/hr transdermal film, extended release: 1 patch transdermal every 72 hours MDD:1 patch  ibuprofen 600 mg oral tablet: 1 tab(s) orally every 8 hours, As Needed   metFORMIN 500 mg oral tablet: 1 tab(s) orally 2 times a day  oxyCODONE 10 mg oral tablet, extended release: 1 tab(s) orally 2 times a day, As Needed MDD:20 mg   polyethylene glycol 3350 oral powder for reconstitution: 17 gram(s) orally once a day  risperiDONE 0.5 mg oral tablet: 1 tab(s) orally 2 times a day, As Needed for severe agitation.  May take up to and no more than 4 times in 1 day. MDD:2 mg  Toprol-XL 25 mg oral tablet, extended release: 1 tab(s) orally once a day   Xarelto 20 mg oral tablet: 1 tab(s) orally once a day (in the evening)

## 2019-12-10 NOTE — PROGRESS NOTE ADULT - REASON FOR ADMISSION
hypoxia with acute respiratory failure

## 2019-12-10 NOTE — CONSULT NOTE ADULT - PROBLEM SELECTOR PROBLEM 3
Pressure injury of left ankle, stage 1
Dementia with behavioral disturbance, unspecified dementia type

## 2019-12-10 NOTE — CONSULT NOTE ADULT - ATTENDING COMMENTS
pt does not appear to be symptomatic, has pain medication (fentanyl patch) and per discussion with daughter has clear Goals of care in mind= pt to be full code, maximal medical therapies/interventions.

## 2019-12-10 NOTE — CONSULT NOTE ADULT - PROBLEM SELECTOR RECOMMENDATION 6
met with daughter Naima at bedside, she stated she wanted to give her mother the chance to get better, that her understanding of the outpatient medications that she had been on had altered her mentation and may take time to clear, she wants her mother to try to regain some muscle strength to stand again and use her arms/hands to feed herself instead of rely on home aides and daughter to do everything for her. I explained that pt may not get much better than this due to the length of time she had been debilitated for. Naima stated she at least wanted a trial of treatment, yet refused placement in rehab facility. Naima has expressed that she would not want her mother in any facility especially now as the holidays are upon us. It appears that pt's daughter has unrealistic expectations of her mother's recovery and would like a home care service like that of [facility] program to come into the home and render care, provide diagnostics etc without having pt leave house as she is bedbound and blind. Will place referral, warned Naima that there is a possibility her mother could stay the same or get worse even with best care, she did not want to make any advance directive as she stated they just lost pt's  not long ago and that pt is a 'fighter' - pt to be discharged home tomorrow per daughter.

## 2019-12-10 NOTE — CONSULT NOTE ADULT - PROBLEM SELECTOR RECOMMENDATION 5
remains on supplemental oxygen   testing today to see if will need to have continuous oxygen therapy at discharge

## 2019-12-10 NOTE — CONSULT NOTE ADULT - PROBLEM SELECTOR RECOMMENDATION 9
pt had been on some antidepressants, pain meds and anxiolytics which have been weaned and minimized  pt starting to emerge and communicate more  delirium portends high mortality rate in hospitalized pts with dementia

## 2019-12-10 NOTE — CONSULT NOTE ADULT - PROBLEM SELECTOR RECOMMENDATION 2
bedbound for several months now with calf muscle atrophy, likely to remain bedbound   reliant for all ADLs

## 2019-12-10 NOTE — CHART NOTE - NSCHARTNOTEFT_GEN_A_CORE
Assessment: Pt with PMH of dementia, depression, long standing benzo use, A.fib, HTN, COPD, DM; admitted with lethargy, AMS, dyspnea, and cough. Pt intubated s/p acute hypoxic respiratory failure, extubated on , no longer with respiratory distress, Pt also with dysphagia, per swallow evaluation from 19, SLP recommended puree solids-nectar thick liquids. Pt with generally poor po intake, POCT within acceptable range, no need for consistent carbohydrate diet at this time. Palliative Care Consult pending.    Factors impacting intake: [ ] none [ ] nausea  [ ] vomiting [ ] diarrhea [ ] constipation  [ ]chewing problems [x] swallowing issues  [x] other: lethargy; AMS    Diet Prescription: Diet, Pureed (19 @ 20:08)    Intake: PO intake varies, 10-60%; Pt is total feed    Current Weight: 76.1 kg (), 76.7 kg (12/3)  % Weight Change: 0.8% (0.6 kg) wt loss x 6 days during hospitalization  Last BM: BM (x1)     Nutrition focused physical exam conducted; Subcutaneous fat loss: [moderate] Orbital fat pads region, [moderate]Buccal fat region, [unable]Triceps region,  [unable]Ribs region.  Muscle wasting: [moderate]Temples region, [unable]Clavicle region, [unable]Shoulder region, [unable]Scapula region, [moderate]Interosseous region,  [unable]thigh region, [severe]Calf region    Pertinent Medications: MEDICATIONS  (STANDING):  albuterol/ipratropium for Nebulization 3 milliLiter(s) Nebulizer every 6 hours  dextrose 5%. 1000 milliLiter(s) (50 mL/Hr) IV Continuous <Continuous>  dextrose 50% Injectable 12.5 Gram(s) IV Push once  dextrose 50% Injectable 25 Gram(s) IV Push once  dextrose 50% Injectable 25 Gram(s) IV Push once  enoxaparin Injectable 80 milliGRAM(s) SubCutaneous every 12 hours  fentaNYL   Patch  12 MICROgram(s)/Hr 1 Patch Transdermal every 72 hours  insulin lispro (HumaLOG) corrective regimen sliding scale   SubCutaneous three times a day before meals  insulin lispro (HumaLOG) corrective regimen sliding scale   SubCutaneous at bedtime  metoprolol succinate ER 12.5 milliGRAM(s) Oral two times a day  polyethylene glycol 3350 17 Gram(s) Oral daily    MEDICATIONS  (PRN):  acetaminophen   Tablet .. 650 milliGRAM(s) Oral every 6 hours PRN Temp greater or equal to 38C (100.4F), Mild Pain (1 - 3)  dextrose 40% Gel 15 Gram(s) Oral once PRN Blood Glucose LESS THAN 70 milliGRAM(s)/deciliter  glucagon  Injectable 1 milliGRAM(s) IntraMuscular once PRN Glucose LESS THAN 70 milligrams/deciliter    Pertinent Labs:  Na145 mmol/L Glu 135 mg/dL<H> K+ 5.4 mmol/L<H> Cr  0.33 mg/dL<L> BUN 14 mg/dL  Phos 3.5 mg/dL  [id number] 7.3 %<H>  Chol 59 mg/dL LDL Unable to calculate LDL Cholesterol (mg/dL) --- Interpretive Comment (for adults 18 and over)  Optimal LDL Level may vary based on clinical situation  Below 70                   Ideal for people at very high risk of heart  disease  Below 100        Ideal for people at risk of heart disease  100 - 129                    [place]  130 - 159                    Borderline high  160 - 189                    High  190 and Above           Very high mg/dL HDL 30 mg/dL<L> Trig 144 mg/dL    CAPILLARY BLOOD GLUCOSE    POCT Blood Glucose.: 139 mg/dL (10 Dec 2019 07:29)  POCT Blood Glucose.: 129 mg/dL (09 Dec 2019 21:47)  POCT Blood Glucose.: 158 mg/dL (09 Dec 2019 18:23)  POCT Blood Glucose.: 135 mg/dL (09 Dec 2019 16:30)  POCT Blood Glucose.: 161 mg/dL (09 Dec 2019 11:43)    Skin: Pt with multiple pressure ulcers; Stage I on L elbow, Stage I on L malleolus, Stage I on L buttocks    Estimated Needs:   [x] no change since previous assessment on 19 regarding estimated protein needs (1.2-1.4 gm/kg = 66-77 gm protein per day)  [x] recalculated: Using IBW of 54.5 kg  30-35 kcal/[id number] kcal daily  30-35 ml/[id number] ml daily    Previous Nutrition Diagnosis: (12/3/19)  [x ] Inadequate Energy Intake    Related to: Decreased ability to consume sufficient energy    As evidenced by: NPO x 1, lethargy, SOB    Nutrition Diagnosis is [x] ongoing  [ ] resolved [ ] not applicable    Goal: Pt to consume >75% meals when medically feasible (goal not met)    New Nutrition Diagnosis: [x] Malnutrition - Moderate malnutrition in the context of chronic illness    Related to: Inadequate energy/protein intake & increased energy/protein needs related to dementia & multiple pressure ulcers    As evidenced by: Physical signs of moderate muscle wasting & fat loss as noted above      Interventions:   Recommend  [x] Change Diet To: Dysphagia 1 Pureed-Nectar thick diet  [x] Nutrition Supplement: NSA Mighty Shake 2x daily (400 kcals & 14 gm protein)  [ ] Nutrition Support  [ ] Other:     Monitoring and Evaluation:   [ x ] PO intake [ x ] Tolerance to diet prescription [ x ] weights [ x ] labs[ x ] follow up per protocol  [ ] other: Assessment: Pt with PMH of dementia, depression, long standing benzo use, A.fib, HTN, COPD, DM; admitted with lethargy, AMS, dyspnea, and cough. Pt intubated s/p acute hypoxic respiratory failure, extubated on , no longer with respiratory distress, Pt also with dysphagia, per swallow evaluation from 19, SLP recommended puree solids-nectar thick liquids. Pt with generally poor po intake, POCT within acceptable range, no need for consistent carbohydrate diet at this time. Palliative Care Consult pending.    Factors impacting intake: [ ] none [ ] nausea  [ ] vomiting [ ] diarrhea [ ] constipation  [ ]chewing problems [x] swallowing issues  [x] other: lethargy; AMS    Diet Prescription: Diet, Pureed (19 @ 20:08)    Intake: PO intake varies, 10-60%; Pt is total feed    Current Weight: 76.1 kg (), 76.7 kg (12/3)  % Weight Change: 0.8% (0.6 kg) wt loss x 6 days during hospitalization; Edema 1+ bilateral ankles and bilateral feet  Last BM: BM (x1)     Nutrition focused physical exam conducted; Subcutaneous fat loss: [moderate] Orbital fat pads region, [moderate]Buccal fat region, [unable]Triceps region,  [unable]Ribs region.  Muscle wasting: [moderate]Temples region, [unable]Clavicle region, [unable]Shoulder region, [unable]Scapula region, [moderate]Interosseous region,  [unable]thigh region, [severe]Calf region    Pertinent Medications: MEDICATIONS  (STANDING):  albuterol/ipratropium for Nebulization 3 milliLiter(s) Nebulizer every 6 hours  dextrose 5%. 1000 milliLiter(s) (50 mL/Hr) IV Continuous <Continuous>  dextrose 50% Injectable 12.5 Gram(s) IV Push once  dextrose 50% Injectable 25 Gram(s) IV Push once  dextrose 50% Injectable 25 Gram(s) IV Push once  enoxaparin Injectable 80 milliGRAM(s) SubCutaneous every 12 hours  fentaNYL   Patch  12 MICROgram(s)/Hr 1 Patch Transdermal every 72 hours  insulin lispro (HumaLOG) corrective regimen sliding scale   SubCutaneous three times a day before meals  insulin lispro (HumaLOG) corrective regimen sliding scale   SubCutaneous at bedtime  metoprolol succinate ER 12.5 milliGRAM(s) Oral two times a day  polyethylene glycol 3350 17 Gram(s) Oral daily    MEDICATIONS  (PRN):  acetaminophen   Tablet .. 650 milliGRAM(s) Oral every 6 hours PRN Temp greater or equal to 38C (100.4F), Mild Pain (1 - 3)  dextrose 40% Gel 15 Gram(s) Oral once PRN Blood Glucose LESS THAN 70 milliGRAM(s)/deciliter  glucagon  Injectable 1 milliGRAM(s) IntraMuscular once PRN Glucose LESS THAN 70 milligrams/deciliter    Pertinent Labs:  Na145 mmol/L Glu 135 mg/dL<H> K+ 5.4 mmol/L<H> Cr  0.33 mg/dL<L> BUN 14 mg/dL  Phos 3.5 mg/dL  [id number] 7.3 %<H>  Chol 59 mg/dL LDL Unable to calculate LDL Cholesterol (mg/dL) --- Interpretive Comment (for adults 18 and over)  Optimal LDL Level may vary based on clinical situation  Below 70                   Ideal for people at very high risk of heart  disease  Below 100        Ideal for people at risk of heart disease  100 - 129                    [place]  130 - 159                    Borderline high  160 - 189                    High  190 and Above           Very high mg/dL HDL 30 mg/dL<L> Trig 144 mg/dL    CAPILLARY BLOOD GLUCOSE    POCT Blood Glucose.: 139 mg/dL (10 Dec 2019 07:29)  POCT Blood Glucose.: 129 mg/dL (09 Dec 2019 21:47)  POCT Blood Glucose.: 158 mg/dL (09 Dec 2019 18:23)  POCT Blood Glucose.: 135 mg/dL (09 Dec 2019 16:30)  POCT Blood Glucose.: 161 mg/dL (09 Dec 2019 11:43)    Skin: Pt with multiple pressure ulcers; Stage I on L elbow, Stage I on L malleolus, Stage I on L buttocks    Estimated Needs:   [x] no change since previous assessment on 19 regarding estimated protein needs (1.2-1.4 gm/kg = 66-77 gm protein per day)  [x] recalculated: Using IBW of 54.5 kg  30-35 kcal/k2629-8509 kcal daily  30-35 ml/k1703-2131 ml daily    Previous Nutrition Diagnosis: (12/3/19)  [x ] Inadequate Energy Intake    Related to: Decreased ability to consume sufficient energy    As evidenced by: NPO x 1, lethargy, SOB    Nutrition Diagnosis is [x] ongoing  [ ] resolved [ ] not applicable    Goal: Pt to consume >75% meals when medically feasible (goal not met)    New Nutrition Diagnosis: [x] Malnutrition - Moderate malnutrition in the context of chronic illness    Related to: Inadequate energy/protein intake & increased energy/protein needs related to dementia & multiple pressure ulcers    As evidenced by: Physical signs of moderate muscle wasting & fat loss as noted above      Interventions:   Recommend  [x] Change Diet To: Dysphagia 1 Pureed-Nectar thick diet  [x] Nutrition Supplement: NSA Mighty Shake 2x daily (400 kcals & 14 gm protein)  [ ] Nutrition Support  [ ] Other:     Monitoring and Evaluation:   [ x ] PO intake [ x ] Tolerance to diet prescription [ x ] weights [ x ] labs[ x ] follow up per protocol  [ ] other:

## 2019-12-10 NOTE — DISCHARGE NOTE PROVIDER - NSDCCPCAREPLAN_GEN_ALL_CORE_FT
PRINCIPAL DISCHARGE DIAGNOSIS  Diagnosis: Acute respiratory failure with hypoxia  Assessment and Plan of Treatment: - due to Gram Positive Aspiration Pneumonia  - no longer requiring 02,  - was intubated and weaned off intubation during hospital stay  - completed course of IV antibiotics.   - will need outpatiet follow up with Pulmonology        SECONDARY DISCHARGE DIAGNOSES  Diagnosis: Chronic atrial fibrillation  Assessment and Plan of Treatment: - resume Xarelto daily   - Metoprolol dose has been decreased due to lower blood pressure.  Take 1 tablet daily.    Diagnosis: Localized osteoarthrosis  Assessment and Plan of Treatment: - severe bilateral knee pain due to osteoarthritis  - Patient has been started on fentanyl patch during hospitalization  - Change patch every 3 days.    - May use motrin for breathrough pain    Diagnosis: Oral phase dysphagia  Assessment and Plan of Treatment: - should eat a pureed diet with nectar thick liquids  - keep the head of bed at 30 degrees  - should sit at 90 degree angle when eating or drinking.    Diagnosis: Septic shock due to Staphylococcus aureus  Assessment and Plan of Treatment: - resolved during hospital stay    Diagnosis: Delirium secondary to multiple medical problems  Assessment and Plan of Treatment: - patient had acute delirium due to polypharmacy, critical illness, and ICU stay.  She has had improvement in her cognitive function and her delirium has improved.  She is likely to continue improving over time.  Patient is NOT being treated for dementia but was treated for acute delirium due to multiple medical conditions.  She is doing well at time of discharge.  All standing psychotropic medications have been discontinued.    Diagnosis: COPD (chronic obstructive pulmonary disease)  Assessment and Plan of Treatment: - patient should continue to use Albuterol nebulizer and Tiotripium    Diagnosis: Functional quadriplegia  Assessment and Plan of Treatment: - Turn and position patient every 2 hours  - Keep skin clean and dry    Diagnosis: Urinary tract infection without hematuria, site unspecified  Assessment and Plan of Treatment: - treated and resolved during hospital stay  - encourage oral hydration. PRINCIPAL DISCHARGE DIAGNOSIS  Diagnosis: Acute respiratory failure with hypoxia  Assessment and Plan of Treatment: - due to Gram Positive Aspiration Pneumonia  - no longer needing oxygen support  - was intubated improved and weaned off intubation during hospital stay  - completed course of IV antibiotics.   - will need outpatiet follow up with Pulmonology  - is healing well and expected to recover.  Patient is doing well at time of discharge        SECONDARY DISCHARGE DIAGNOSES  Diagnosis: Chronic atrial fibrillation  Assessment and Plan of Treatment: - resume Xarelto daily   - Metoprolol dose has been decreased due to lower blood pressure.  Take 1 tablet daily.    Diagnosis: Localized osteoarthrosis  Assessment and Plan of Treatment: - severe bilateral knee pain due to osteoarthritis  - Patient has been started on fentanyl patch during hospitalization  - Change patch every 3 days.    - May use motrin for breathrough pain      Diagnosis: Oral phase dysphagia  Assessment and Plan of Treatment: - should eat a pureed diet with nectar thick liquids as swallowing function improed.   - keep the head of bed at 30 degrees  - should sit at 90 degree angle when eating or drinking.    Diagnosis: Septic shock due to Staphylococcus aureus  Assessment and Plan of Treatment: - resolved during hospital stay  - ritesh has greatly improved and is doing well  - she is expected to heal and recover    Diagnosis: Delirium secondary to multiple medical problems  Assessment and Plan of Treatment: - patient had acute confusion due to polypharmacy,, critical illness, and ICU stay.  She has had improvement in her mental function and her confusion has improved.  She is likely to continue improving/healing over time.  Patient is NOT being treated for dementia but was treated for acute confusion due to multiple medical conditions.  She is doing well at time of discharge.  All standing psychotropic medications have been discontinued and she is expected to continue to recover.    Diagnosis: COPD (chronic obstructive pulmonary disease)  Assessment and Plan of Treatment: - patient should continue to use Albuterol nebulizer and Tiotripium    Diagnosis: Functional quadriplegia  Assessment and Plan of Treatment: - Turn and position patient every 2 hours  - Keep skin clean and dry    Diagnosis: Urinary tract infection without hematuria, site unspecified  Assessment and Plan of Treatment: - treated and resolved during hospital stay  - encourage oral hydration.

## 2019-12-10 NOTE — CHART NOTE - NSCHARTNOTEFT_GEN_A_CORE
Upon Nutritional Assessment by the Registered Dietitian your patient was determined to meet criteria / has evidence of the following diagnosis/diagnoses:          [ ]  Mild Protein Calorie Malnutrition        [x]  Moderate, Chronic Protein Calorie Malnutrition        [ ] Severe Protein Calorie Malnutrition        [ ] Unspecified Protein Calorie Malnutrition        [ ] Underweight / BMI <19        [ ] Morbid Obesity / BMI > 40      Findings as based on:  •  Comprehensive nutrition assessment and consultation  •  Calorie counts (nutrient intake analysis)  •  Food acceptance and intake status from observations by staff  •  Follow up  •  Patient education  •  Intervention secondary to interdisciplinary rounds  •   concerns      Treatment:    The following diet has been recommended:  Dysphagia 1 Pureed-Nectar thick diet + NSA Mighty Shake 2x daily (400 kcals & 14 gm protein)    PROVIDER Section:     By signing this assessment you are acknowledging and agree with the diagnosis/diagnoses assigned by the Registered Dietitian    Comments:

## 2019-12-10 NOTE — CONSULT NOTE ADULT - PROBLEM SELECTOR RECOMMENDATION 3
Keep both ankles off bed with 2 pillows
reorientation by family at bedside  daughter requests nothing to sedate pt even when having agitation episodes

## 2019-12-10 NOTE — CONSULT NOTE ADULT - SUBJECTIVE AND OBJECTIVE BOX
HPI:  75 yo F bibems for acute resp failure.  Pt. has been reportedly sob and altered for 3 days.  Patient can not give further history due to condition.  Noted to be hypoxic  and blue colored.  No other complaints or inciting event.  No family at bedside now. Disoriented for last few days 4 days, sleeping, coughing with mucous and congestion, can't swallow pills.  Pt. has 24 hour HHA, lives with son. As per son increased secretions last few day, Increased lethargy.  Multiple visits in last month for UTI.  In ER noted to be hypoxic requiring intubation. Received 3 liter IV fluid Lacate 2.3.  ICU called for evaluation and management (25 Nov 2019 16:53)    PERTINENT PM/SXH:   Recurrent UTI  Dementia, senile with depression  Anxiety  COPD with respiratory failure, acute  Dementia  HTN (hypertension)  Afib  DM (diabetes mellitus)      FAMILY HISTORY:      SOCIAL HISTORY:   Significant other/partner: Yes [ ]  No [ x] Children:  Yes [ x]  No [ ] Yarsanism/Spirituality: Temple  Substance hx: Yes[ ]  No [x ]   Tobacco hx:  Yes [ ] No [x ]   Alcohol hx: Yes [ ] No [ x]   Home Opioid hx:  Yes [ ] No [ ]  [ ] I-Stop Reference No:  Living Situation: [x ]Home  [ ]Long term care  [ ]Rehab [ ]Other    ADVANCE DIRECTIVES:    DNR  MOLST  Yes [ ] No [x ]  Living Will  Yes [ ]  No [x ]     [ ] Health Care Proxy(s)  [x ] Surrogate(s)  [ ] Guardian           Name(s): Phone Number(s): Naima Smith [phone] (daughter) Joey (son) [phone]    BASELINE (I)ADL(s) (prior to admission):  [place]: [ ]Total  [ ] Moderate [x ]Dependent    Allergies    No Known Allergies    Intolerances    MEDICATIONS  (STANDING):  albuterol/ipratropium for Nebulization 3 milliLiter(s) Nebulizer every 6 hours  dextrose 5%. 1000 milliLiter(s) (50 mL/Hr) IV Continuous <Continuous>  dextrose 50% Injectable 12.5 Gram(s) IV Push once  dextrose 50% Injectable 25 Gram(s) IV Push once  dextrose 50% Injectable 25 Gram(s) IV Push once  enoxaparin Injectable 80 milliGRAM(s) SubCutaneous every 12 hours  fentaNYL   Patch  12 MICROgram(s)/Hr 1 Patch Transdermal every 72 hours  insulin lispro (HumaLOG) corrective regimen sliding scale   SubCutaneous three times a day before meals  insulin lispro (HumaLOG) corrective regimen sliding scale   SubCutaneous at bedtime  metoprolol succinate ER 12.5 milliGRAM(s) Oral two times a day  polyethylene glycol 3350 17 Gram(s) Oral daily    MEDICATIONS  (PRN):  acetaminophen   Tablet .. 650 milliGRAM(s) Oral every 6 hours PRN Temp greater or equal to 38C (100.4F), Mild Pain (1 - 3)  dextrose 40% Gel 15 Gram(s) Oral once PRN Blood Glucose LESS THAN 70 milliGRAM(s)/deciliter  glucagon  Injectable 1 milliGRAM(s) IntraMuscular once PRN Glucose LESS THAN 70 milligrams/deciliter    PRESENT SYMPTOMS: [ x]Unable to obtain due to poor mentation   Source if other than patient:  [ ]Family   [ ]Team     Pain (Impact on QOL):    Location -   Severity -        Minimal acceptable level (0-10 scale):  Quality:   Onset:   Duration:                 Aggravating factors -  Relieving factors -  Radiation -    PAIN AD Score:     [email or web address]/[email or web address] (press ctrl +  left click to view)    Dyspnea:  Yes [ ] No [ ] - [ ]Mild [ ]Moderate [ ]Severe  Anxiety:    Yes [ ] No [ ] - [ ]Mild [ ]Moderate [ ]Severe  Fatigue:    Yes [ ] No [ ] - [ ]Mild [ ]Moderate [ ]Severe  Nausea:    Yes [ ] No [ ] - [ ]Mild [ ]Moderate [ ]Severe                         Loss of appetite: Yes [ ] No [ ] - [ ]Mild [ ]Moderate [ ]Severe             Constipation:  Yes [ ] No [ ] - [ ]Mild [ ]Moderate [ ]Severe  Grief: Yes [ ] No [ ]     Other Symptoms:  [x ]All other review of systems negative     Karnofsky Performance Score/Palliative Performance Status Version 2:       20 %    http://palliative.info/resource_material/[email or web address].pdf    PHYSICAL EXAM:  Vital Signs Last 24 Hrs  T(C): 36.7 (10 Dec 2019 11:12), Max: 36.9 (09 Dec 2019 23:54)  T(F): 98.1 (10 Dec 2019 11:12), Max: 98.5 (09 Dec 2019 23:54)  HR: 110 (10 Dec 2019 11:12) (88 - 122)  BP: 109/53 (10 Dec 2019 11:12) (109/53 - 130/64)  BP(mean): 72 (09 Dec 2019 23:54) (72 - 72)  RR: 20 (10 Dec 2019 11:12) (16 - 20)  SpO2: 93% (10 Dec 2019 11:12) (92% - 95%) I&O's Summary    09 Dec 2019 07:01  -  10 Dec 2019 07:00  --------------------------------------------------------  IN: 0 mL / OUT: 1 mL / NET: -1 mL        GENERAL:  [ ]Alert  [ ]Oriented x   [ x]Lethargic  [ ]Cachexia  [ ]Unarousable  [x ]Verbal  [ ]Non-Verbal  Behavioral:   [ ] Anxiety  [x ] Delirium [ ] Agitation [ ] Other  HEENT:  [ ]Normal   [x ]Dry mouth   [ ]ET Tube/Trach  [ ]Oral lesions  PULMONARY:   [ x]Clear  [x ]Tachypnea  [ ]Audible excessive secretions   [ ]Rhonchi        [ ]Right [ ]Left [ ]Bilateral  [ ]Crackles        [ ]Right [ ]Left [ ]Bilateral  [ ]Wheezing     [ ]Right [ ]Left [ ]Bilateral  CARDIOVASCULAR:    [ ]Regular [ ]Irregular [x ]Tachy  [ ]Reid [ ]Murmur [ ]Other  GASTROINTESTINAL:  [ x]Soft  [ ]Distended   [ x]+BS  [x ]Non tender [ ]Tender  [ ]PEG [ ]OGT/ NGT  Last BM: 12/7 12-09-19 @ 07:01  -  12-10-19 @ 07:00  --------------------------------------------------------  OUT: 1 mL      GENITOURINARY:  [ ]Normal [x ] Incontinent   [ ]Oliguria/Anuria   [ ]Uriarte  MUSCULOSKELETAL:   [ ]Normal   [ ]Weakness  [x ]Bed/Wheelchair bound [ ]Edema  NEUROLOGIC:   [ ]No focal deficits  [ x] Cognitive impairment  [ x] Dysphagia [ ]Dysarthria [ ] Paresis [ ]Other   SKIN:   [ ]Normal   [x ]Pressure ulcer(s)  [ ]Rash    LABS:                        11.9   6.69  )-----------( 330      ( 09 Dec 2019 06:34 )             38.7   12-09    145  |  109<H>  |  14  ----------------------------<  135<H>  5.4<H>   |  29  |  0.33<L>    Ca    9.2      09 Dec 2019 13:15  Phos  3.5     12-09  Mg     2.0     12-09          RADIOLOGY & ADDITIONAL STUDIES:     PROTEIN CALORIE MALNUTRITION PRESENT: [x ] Yes [ ] No  [ x] PPSV2 < or = to 30% [ x] significant weight loss  [ x] poor nutritional intake [x ] catabolic state [x ] anasarca     Albumin, Serum: 1.8 g/dL (11-30-19 @ 04:17)      REFERRALS:   [ ]Chaplaincy  [ ] Hospice  [ ]Child Life  [ x]Social Work  [x ]Case management [ ]Holistic Therapy   Goals of Care Discussion Document: HPI:  75 yo F bibems for acute resp failure.  Pt. has been reportedly sob and altered for 3 days.  Patient can not give further history due to condition.  Noted to be hypoxic  and blue colored.  No other complaints or inciting event.  No family at bedside now. Disoriented for last few days 4 days, sleeping, coughing with mucous and congestion, can't swallow pills.  Pt. has 24 hour HHA, lives with son. As per son increased secretions last few day, Increased lethargy.  Multiple visits in last month for UTI.  In ER noted to be hypoxic requiring intubation. Received 3 liter IV fluid Lacate 2.3.  ICU called for evaluation and management (25 Nov 2019 16:53)    PERTINENT PM/SXH:   Recurrent UTI  Dementia, senile with depression  Anxiety  COPD with respiratory failure, acute  Dementia  HTN (hypertension)  Afib  DM (diabetes mellitus)      FAMILY HISTORY:      SOCIAL HISTORY:   Significant other/partner: Yes [ ]  No [ x] Children:  Yes [ x]  No [ ] Pentecostal/Spirituality: Confucianist  Substance hx: Yes[ ]  No [x ]   Tobacco hx:  Yes [ ] No [x ]   Alcohol hx: Yes [ ] No [ x]   Home Opioid hx:  Yes [ ] No [ ]  [ ] I-Stop Reference No:[id number]   Alprazolam 0.5mg, Diazepam 5mg, hydrocodone-acetaminophen 5/325, oxycodone 5/325   Living Situation: [x ]Home  [ ]Long term care  [ ]Rehab [ ]Other    ADVANCE DIRECTIVES:    DNR  MOLST  Yes [ ] No [x ]  Living Will  Yes [ ]  No [x ]     [ ] Health Care Proxy(s)  [x ] Surrogate(s)  [ ] Guardian           Name(s): Phone Number(s): Naima Smith [phone] (daughter) Joey (son) [phone]    BASELINE (I)ADL(s) (prior to admission):  [place]: [ ]Total  [ ] Moderate [x ]Dependent    Allergies    No Known Allergies    Intolerances    MEDICATIONS  (STANDING):  albuterol/ipratropium for Nebulization 3 milliLiter(s) Nebulizer every 6 hours  dextrose 5%. 1000 milliLiter(s) (50 mL/Hr) IV Continuous <Continuous>  dextrose 50% Injectable 12.5 Gram(s) IV Push once  dextrose 50% Injectable 25 Gram(s) IV Push once  dextrose 50% Injectable 25 Gram(s) IV Push once  enoxaparin Injectable 80 milliGRAM(s) SubCutaneous every 12 hours  fentaNYL   Patch  12 MICROgram(s)/Hr 1 Patch Transdermal every 72 hours  insulin lispro (HumaLOG) corrective regimen sliding scale   SubCutaneous three times a day before meals  insulin lispro (HumaLOG) corrective regimen sliding scale   SubCutaneous at bedtime  metoprolol succinate ER 12.5 milliGRAM(s) Oral two times a day  polyethylene glycol 3350 17 Gram(s) Oral daily    MEDICATIONS  (PRN):  acetaminophen   Tablet .. 650 milliGRAM(s) Oral every 6 hours PRN Temp greater or equal to 38C (100.4F), Mild Pain (1 - 3)  dextrose 40% Gel 15 Gram(s) Oral once PRN Blood Glucose LESS THAN 70 milliGRAM(s)/deciliter  glucagon  Injectable 1 milliGRAM(s) IntraMuscular once PRN Glucose LESS THAN 70 milligrams/deciliter    PRESENT SYMPTOMS: [ x]Unable to obtain due to poor mentation   Source if other than patient:  [ ]Family   [ ]Team     Pain (Impact on QOL):    Location -   Severity -        Minimal acceptable level (0-10 scale):  Quality:   Onset:   Duration:                 Aggravating factors -  Relieving factors -  Radiation -    PAIN AD Score:     [email or web address] (press ctrl +  left click to view)    Dyspnea:  Yes [ ] No [ ] - [ ]Mild [ ]Moderate [ ]Severe  Anxiety:    Yes [ ] No [ ] - [ ]Mild [ ]Moderate [ ]Severe  Fatigue:    Yes [ ] No [ ] - [ ]Mild [ ]Moderate [ ]Severe  Nausea:    Yes [ ] No [ ] - [ ]Mild [ ]Moderate [ ]Severe                         Loss of appetite: Yes [ ] No [ ] - [ ]Mild [ ]Moderate [ ]Severe             Constipation:  Yes [ ] No [ ] - [ ]Mild [ ]Moderate [ ]Severe  Grief: Yes [ ] No [ ]     Other Symptoms:  [x ]All other review of systems negative     Karnofsky Performance Score/Palliative Performance Status Version 2:       20 %    http://palliative.info/resource_material/[email or web address].pdf    PHYSICAL EXAM:  Vital Signs Last 24 Hrs  T(C): 36.7 (10 Dec 2019 11:12), Max: 36.9 (09 Dec 2019 23:54)  T(F): 98.1 (10 Dec 2019 11:12), Max: 98.5 (09 Dec 2019 23:54)  HR: 110 (10 Dec 2019 11:12) (88 - 122)  BP: 109/53 (10 Dec 2019 11:12) (109/53 - 130/64)  BP(mean): 72 (09 Dec 2019 23:54) (72 - 72)  RR: 20 (10 Dec 2019 11:12) (16 - 20)  SpO2: 93% (10 Dec 2019 11:12) (92% - 95%) I&O's Summary    09 Dec 2019 07:01  -  10 Dec 2019 07:00  --------------------------------------------------------  IN: 0 mL / OUT: 1 mL / NET: -1 mL        GENERAL:  [ ]Alert  [ ]Oriented x   [ x]Lethargic  [ ]Cachexia  [ ]Unarousable  [x ]Verbal  [ ]Non-Verbal  Behavioral:   [ ] Anxiety  [x ] Delirium [ ] Agitation [ ] Other  HEENT:  [ ]Normal   [x ]Dry mouth   [ ]ET Tube/Trach  [ ]Oral lesions  PULMONARY:   [ x]Clear  [x ]Tachypnea  [ ]Audible excessive secretions   [ ]Rhonchi        [ ]Right [ ]Left [ ]Bilateral  [ ]Crackles        [ ]Right [ ]Left [ ]Bilateral  [ ]Wheezing     [ ]Right [ ]Left [ ]Bilateral  CARDIOVASCULAR:    [ ]Regular [ ]Irregular [x ]Tachy  [ ]Reid [ ]Murmur [ ]Other  GASTROINTESTINAL:  [ x]Soft  [ ]Distended   [ x]+BS  [x ]Non tender [ ]Tender  [ ]PEG [ ]OGT/ NGT  Last BM: 12/7 12-09-19 @ 07:01  -  12-10-19 @ 07:00  --------------------------------------------------------  OUT: 1 mL      GENITOURINARY:  [ ]Normal [x ] Incontinent   [ ]Oliguria/Anuria   [ ]Uriarte  MUSCULOSKELETAL:   [ ]Normal   [ ]Weakness  [x ]Bed/Wheelchair bound [ ]Edema  NEUROLOGIC:   [ ]No focal deficits  [ x] Cognitive impairment  [ x] Dysphagia [ ]Dysarthria [ ] Paresis [ ]Other   SKIN:   [ ]Normal   [x ]Pressure ulcer(s)  [ ]Rash    LABS:                        11.9   6.69  )-----------( 330      ( 09 Dec 2019 06:34 )             38.7   12-09    145  |  109<H>  |  14  ----------------------------<  135<H>  5.4<H>   |  29  |  0.33<L>    Ca    9.2      09 Dec 2019 13:15  Phos  3.5     12-09  Mg     2.0     12-09          RADIOLOGY & ADDITIONAL STUDIES:     PROTEIN CALORIE MALNUTRITION PRESENT: [x ] Yes [ ] No  [ x] PPSV2 < or = to 30% [ x] significant weight loss  [ x] poor nutritional intake [x ] catabolic state [x ] anasarca     Albumin, Serum: 1.8 g/dL (11-30-19 @ 04:17)      REFERRALS:   [ ]Chaplaincy  [ ] Hospice  [ ]Child Life  [ x]Social Work  [x ]Case management [ ]Holistic Therapy   Goals of Care Discussion Document:

## 2019-12-10 NOTE — DISCHARGE NOTE PROVIDER - CARE PROVIDER_API CALL
Jesusita Smart)  Geriatric Medicine; Internal Medicine  [address] [address] [place] [address]  Phone: [phone]  Fax: [phone]  Follow Up Time:

## 2019-12-10 NOTE — DISCHARGE NOTE PROVIDER - HOSPITAL COURSE
Patient is a 74y old  Female who presents with a chief complaint of hypoxia with acute respiratory failure (10 Dec 2019 15:05)        HPI:  73 yo F bibems for acute resp failure.  Pt. has been reportedly sob and altered for 3 days.  Patient can not give further history due to condition.  Noted to be hypoxic  and blue colored.  No other complaints or inciting event.  No family at bedside now. Disoriented for last few days 4 days, sleeping, coughing with mucous and congestion, can't swallow pills.  Pt. has 24 hour HHA, lives with son. As per son increased secretions last few day, Increased lethargy.  Multiple visits in last month for UTI.  In ER noted to be hypoxic requiring intubation. Received 3 liter IV fluid Lacate 2.3.  ICU called for evaluation and management (25 Nov 2019 16:53)  SUBJECTIVE & OBJECTIVE: Pt seen and examined at bedside.     PHYSICAL EXAM:    /Vital Signs Last 24 Hrs    T(C): 36.7 (10 Dec 2019 11:12), Max: 36.9 (09 Dec 2019 23:54)    T(F): 98.1 (10 Dec 2019 11:12), Max: 98.5 (09 Dec 2019 23:54)    HR: 110 (10 Dec 2019 11:12) (88 - 122)    BP: 109/53 (10 Dec 2019 11:12) (109/53 - 130/64)    BP(mean): 72 (09 Dec 2019 23:54) (72 - 72)    RR: 20 (10 Dec 2019 11:12) (16 - 20)    SpO2: 93% (10 Dec 2019 11:12) (92% - 95%)    Daily       Daily     I&O's Detail        09 Dec 2019 07:01  -  10 Dec 2019 07:00    --------------------------------------------------------    IN:    Total IN: 0 mL        OUT:      Stool: 1 mL    Total OUT: 1 mL        Total NET: -1 mL                GENERAL: NAD, well-groomed, well-developed    HEAD:  Atraumatic, Normocephalic    EYES: EOMI, PERRLA, conjunctiva and sclera clear    ENMT: Moist mucous membranes    NECK: Supple, No JVD    NERVOUS SYSTEM:  Alert & Oriented X3, Motor Strength 5/5 B/L upper and lower extremities; DTRs 2+ intact and symmetric    CHEST/LUNG: Clear to auscultation bilaterally; No rales, rhonchi, wheezing, or rubs    HEART: Regular rate and rhythm; No murmurs, rubs, or gallops    ABDOMEN: Soft, Nontender, Nondistended; Bowel sounds present    EXTREMITIES:  2+ Peripheral Pulses, No clubbing, cyanosis, or edema        MEDICATIONS  (STANDING):    albuterol/ipratropium for Nebulization 3 milliLiter(s) Nebulizer every 6 hours    dextrose 5%. 1000 milliLiter(s) (50 mL/Hr) IV Continuous <Continuous>    dextrose 50% Injectable 12.5 Gram(s) IV Push once    dextrose 50% Injectable 25 Gram(s) IV Push once    dextrose 50% Injectable 25 Gram(s) IV Push once    enoxaparin Injectable 80 milliGRAM(s) SubCutaneous every 12 hours    fentaNYL   Patch  12 MICROgram(s)/Hr 1 Patch Transdermal every 72 hours    insulin lispro (HumaLOG) corrective regimen sliding scale   SubCutaneous three times a day before meals    insulin lispro (HumaLOG) corrective regimen sliding scale   SubCutaneous at bedtime    metoprolol succinate ER 12.5 milliGRAM(s) Oral two times a day    polyethylene glycol 3350 17 Gram(s) Oral daily        MEDICATIONS  (PRN):    acetaminophen   Tablet .. 650 milliGRAM(s) Oral every 6 hours PRN Temp greater or equal to 38C (100.4F), Mild Pain (1 - 3)    dextrose 40% Gel 15 Gram(s) Oral once PRN Blood Glucose LESS THAN 70 milliGRAM(s)/deciliter    glucagon  Injectable 1 milliGRAM(s) IntraMuscular once PRN Glucose LESS THAN 70 milligrams/deciliter            LABS:                            11.9     6.69  )-----------( 330      ( 09 Dec 2019 06:34 )               38.7         12-09        145  |  109<H>  |  14    ----------------------------<  135<H>    5.4<H>   |  29  |  0.33<L>        Ca    9.2      09 Dec 2019 13:15    Phos  3.5     12-09    Mg     2.0     12-09                    CAPILLARY BLOOD GLUCOSE            POCT Blood Glucose.: 148 mg/dL (10 Dec 2019 16:19)    POCT Blood Glucose.: 152 mg/dL (10 Dec 2019 11:10)    POCT Blood Glucose.: 139 mg/dL (10 Dec 2019 07:29)    POCT Blood Glucose.: 129 mg/dL (09 Dec 2019 21:47)    POCT Blood Glucose.: 158 mg/dL (09 Dec 2019 18:23)                        RECENT CULTURES: Patient is a 74y old  Female who presents with a chief complaint of hypoxia with acute respiratory failure (10 Dec 2019 15:05)        HPI:  73 yo F bibems for acute resp failure.  Pt. has been reportedly sob and altered for 3 days.  Patient can not give further history due to condition.  Noted to be hypoxic  and blue colored.  No other complaints or inciting event.  No family at bedside now. Disoriented for last few days 4 days, sleeping, coughing with mucous and congestion, can't swallow pills.  Pt. has 24 hour HHA, lives with son. As per son increased secretions last few day, Increased lethargy.  Multiple visits in last month for UTI.  In ER noted to be hypoxic requiring intubation. Received 3 liter IV fluid Lacate 2.3.  ICU called for evaluation and management (25 Nov 2019 16:53) During her hospitalization, the patient was intubated in the ED.  She was hypotensive and required pressors.  Patient was found to have a positive sputum culture for Staph Aureus.  She was treated for Aspiration PNA and UTI.  She was successfully extubated and transitioned to telemetry floor.  Patient was also found to have subsequent dysphagia and was evaluated by speech therapy with recommendations for pureed food with nectar thick liquids.  Patient was evaluated by Psych for delirium.  Her standing psychotropic medications were discontinued.  She was evaluated by Palliative care.  At time of discharge, family is not interested in hospice care.  She will return home, nursing services referral has been made.  Family has also been provided with list of visting MD services, and telepsychiatry services.  Patient is doing well at time of discharge.              PHYSICAL EXAM:    /Vital Signs Last 24 Hrs    T(C): 36.7 (10 Dec 2019 11:12), Max: 36.9 (09 Dec 2019 23:54)    T(F): 98.1 (10 Dec 2019 11:12), Max: 98.5 (09 Dec 2019 23:54)    HR: 110 (10 Dec 2019 11:12) (88 - 122)    BP: 109/53 (10 Dec 2019 11:12) (109/53 - 130/64)    BP(mean): 72 (09 Dec 2019 23:54) (72 - 72)    RR: 20 (10 Dec 2019 11:12) (16 - 20)    SpO2: 93% (10 Dec 2019 11:12) (92% - 95%)        GENERAL: chronically ill appearing. thin    HEAD:  Atraumatic, Normocephalic    EYES: blind, bilateral cataracts     ENMT: Moist mucous membranes    NECK: Supple, No JVD    NERVOUS SYSTEM:  Alert & Oriented X2, Motor Strength 3/5 B/L upper and lower extremities; DTRs 2+ intact and symmetric    CHEST/LUNG: Clear to auscultation bilaterally; No rales, rhonchi, wheezing, or rubs    HEART: irregularly irregular HR    ABDOMEN: Soft, Nontender, Nondistended; Bowel sounds present    EXTREMITIES:  2+ Peripheral Pulses, No clubbing, cyanosis, or edema        MEDICATIONS  (STANDING):    albuterol/ipratropium for Nebulization 3 milliLiter(s) Nebulizer every 6 hours    enoxaparin Injectable 80 milliGRAM(s) SubCutaneous every 12 hours    fentaNYL   Patch  12 MICROgram(s)/Hr 1 Patch Transdermal every 72 hours    insulin lispro (HumaLOG) corrective regimen sliding scale   SubCutaneous three times a day before meals    insulin lispro (HumaLOG) corrective regimen sliding scale   SubCutaneous at bedtime    metoprolol succinate ER 12.5 milliGRAM(s) Oral two times a day    polyethylene glycol 3350 17 Gram(s) Oral daily        LABS:                            11.9     6.69  )-----------( 330      ( 09 Dec 2019 06:34 )               38.7         12-09        145  |  109<H>  |  14    ----------------------------<  135<H>    5.4<H>   |  29  |  0.33<L>    Ca    9.2      09 Dec 2019 13:15    Phos  3.5     12-09    Mg     2.0     12-09    CAPILLARY BLOOD GLUCOSE: POCT Blood Glucose.: 148 mg/dL (10 Dec 2019 16:19)  POCT Blood Glucose.: 152 mg/dL (10 Dec 2019 11:10)  POCT Blood Glucose.: 139 mg/dL (10 Dec 2019 07:29)  POCT Blood Glucose.: 129 mg/dL (09 Dec 2019 21:47)  POCT Blood Glucose.: 158 mg/dL (09 Dec 2019 18:23)        RECENT CULTURES: Culture - Sputum . (11.26.19 @ 18:44)    Gram Stain: Moderate polymorphonuclear leukocytes per low power field Rare Squamous epithelial cells per low power field  Few Gram positive cocci in pairs per oil power field Few Yeast like cells per oil power field   Organism Identification: Staphylococcus aureus    Organism: Staphylococcus aureus    Method Type: SAUL    Culture - Blood (11.25.19 @ 18:16)  Specimen Source: .Blood Blood  Culture Results: No growth at 5 days.    No CT evidence for acute pulmonary embolism as discussed.  Left lower lobe airspace consolidation which may reflect atelectasis and/or pneumonia in the proper clinical setting.  No acute intra-abdominal pathology demonstrated as discussed.  Other findings as discussed above.  < end of copied text >

## 2019-12-10 NOTE — CONSULT NOTE ADULT - REASON FOR ADMISSION
hypoxia with acute respiratory failure

## 2019-12-10 NOTE — CONSULT NOTE ADULT - ASSESSMENT
73 y/o w PMH dementia, bedbound, legally blind, obesity, HTN, a-fib, DM, ?COPD, depression, initially admitted for respiratory failure due to PNA and sepsis/UTI, AMS- slow to wean off vent now on medical floor, slowly becoming more responsive and communicative.

## 2019-12-10 NOTE — PROGRESS NOTE ADULT - SUBJECTIVE AND OBJECTIVE BOX
INTERVAL HPI:   74 year female with HTN, Recently diagnosed DM, Chronic A Fib, TIA/ CVA, Unsteady gait with frequent falls, suspected COPD and dementia.  50 pack year history of smoking, quit 2 years ago. Has been on as needed bronchodilators.  Son reports that she was feeling more than normal cold for about a week, then developed sob along with cough the night before her presentation. Brought in with acute Respiratory distress and altered mental status.  Got intubated in ED for acute hypoxic Respiratory failure secondary to pneumonia.  Got successfully extubated, now on nasal O2.  12/04/19: Out of ICU.    OVERNIGHT EVENTS:  Awake, comfortable    Vital Signs Last 24 Hrs  T(C): 36.9 (10 Dec 2019 17:16), Max: 36.9 (09 Dec 2019 23:54)  T(F): 98.5 (10 Dec 2019 17:16), Max: 98.5 (09 Dec 2019 23:54)  HR: 110 (10 Dec 2019 17:16) (88 - 111)  BP: 121/60 (10 Dec 2019 17:16) (109/53 - 121/60)  BP(mean): 72 (09 Dec 2019 23:54) (72 - 72)  RR: 18 (10 Dec 2019 17:16) (16 - 20)  SpO2: 92% (10 Dec 2019 17:16) (92% - 95%)    PHYSICAL EXAM:  GEN:         Awake, responsive and comfortable.  HEENT:    Normal.    RESP:       no wheezing.  CVS:             Regular rate and rhythm.   ABD:         Soft, non-tender, non-distended;     MEDICATIONS  (STANDING):  albuterol/ipratropium for Nebulization 3 milliLiter(s) Nebulizer every 6 hours  dextrose 5%. 1000 milliLiter(s) (50 mL/Hr) IV Continuous <Continuous>  dextrose 50% Injectable 12.5 Gram(s) IV Push once  dextrose 50% Injectable 25 Gram(s) IV Push once  dextrose 50% Injectable 25 Gram(s) IV Push once  enoxaparin Injectable 80 milliGRAM(s) SubCutaneous every 12 hours  fentaNYL   Patch  12 MICROgram(s)/Hr 1 Patch Transdermal every 72 hours  insulin lispro (HumaLOG) corrective regimen sliding scale   SubCutaneous three times a day before meals  insulin lispro (HumaLOG) corrective regimen sliding scale   SubCutaneous at bedtime  metoprolol succinate ER 12.5 milliGRAM(s) Oral two times a day  polyethylene glycol 3350 17 Gram(s) Oral daily    MEDICATIONS  (PRN):  acetaminophen   Tablet .. 650 milliGRAM(s) Oral every 6 hours PRN Temp greater or equal to 38C (100.4F), Mild Pain (1 - 3)  dextrose 40% Gel 15 Gram(s) Oral once PRN Blood Glucose LESS THAN 70 milliGRAM(s)/deciliter  glucagon  Injectable 1 milliGRAM(s) IntraMuscular once PRN Glucose LESS THAN 70 milligrams/deciliter    LABS:                        11.9   6.69  )-----------( 330      ( 09 Dec 2019 06:34 )             38.7     12-09    145  |  109<H>  |  14  ----------------------------<  135<H>  5.4<H>   |  29  |  0.33<L>    Ca    9.2      09 Dec 2019 13:15  Phos  3.5     12-09  Mg     2.0     12-09 12-06 @ 12:45  pH: 7.46  pCO2: 38  pO2: 77  SaO2: 96    ASSESSMENT AND PLAN:  ·	S/P Acute hypoxic Respiratory failure  ·	Left lower lobe pneumonia.  ·	Staph Aureus in Sputum  ·	Acute metabolic Encephalopathy.  ·	Chronic Atrial Fibrillation.  ·	Suspect COPD.  ·	HTN by history.  ·	DM by history.  ·	Frequent falls from unsteady gait.  ·	Dysphagia.    Continue O2 and nebulizer.  Suction as needed.  SPO2 on room air before discharge for O2 need.

## 2019-12-11 VITALS
OXYGEN SATURATION: 94 % | WEIGHT: 169.54 LBS | DIASTOLIC BLOOD PRESSURE: 72 MMHG | SYSTOLIC BLOOD PRESSURE: 125 MMHG | HEART RATE: 114 BPM | RESPIRATION RATE: 17 BRPM

## 2019-12-11 LAB
ANION GAP SERPL CALC-SCNC: 8 MMOL/L — SIGNIFICANT CHANGE UP (ref 5–17)
BUN SERPL-MCNC: 16 MG/DL — SIGNIFICANT CHANGE UP (ref 7–23)
CALCIUM SERPL-MCNC: 9.3 MG/DL — SIGNIFICANT CHANGE UP (ref 8.5–10.1)
CHLORIDE SERPL-SCNC: 105 MMOL/L — SIGNIFICANT CHANGE UP (ref 96–108)
CO2 SERPL-SCNC: 29 MMOL/L — SIGNIFICANT CHANGE UP (ref 22–31)
CREAT SERPL-MCNC: 0.27 MG/DL — LOW (ref 0.5–1.3)
GLUCOSE BLDC GLUCOMTR-MCNC: 133 MG/DL — HIGH (ref 70–99)
GLUCOSE SERPL-MCNC: 146 MG/DL — HIGH (ref 70–99)
POTASSIUM SERPL-MCNC: 3.6 MMOL/L — SIGNIFICANT CHANGE UP (ref 3.5–5.3)
POTASSIUM SERPL-SCNC: 3.6 MMOL/L — SIGNIFICANT CHANGE UP (ref 3.5–5.3)
SODIUM SERPL-SCNC: 142 MMOL/L — SIGNIFICANT CHANGE UP (ref 135–145)

## 2019-12-11 PROCEDURE — 99239 HOSP IP/OBS DSCHRG MGMT >30: CPT

## 2019-12-11 RX ORDER — OXYCODONE HYDROCHLORIDE 5 MG/1
1 TABLET ORAL
Qty: 6 | Refills: 0
Start: 2019-12-11 | End: 2019-12-13

## 2019-12-11 RX ADMIN — ENOXAPARIN SODIUM 80 MILLIGRAM(S): 100 INJECTION SUBCUTANEOUS at 05:37

## 2019-12-11 RX ADMIN — Medication 650 MILLIGRAM(S): at 02:30

## 2019-12-11 RX ADMIN — Medication 3 MILLILITER(S): at 05:17

## 2019-12-11 RX ADMIN — Medication 12.5 MILLIGRAM(S): at 05:37

## 2019-12-11 RX ADMIN — Medication 650 MILLIGRAM(S): at 01:32

## 2019-12-11 NOTE — DISCHARGE NOTE NURSING/CASE MANAGEMENT/SOCIAL WORK - PATIENT PORTAL LINK FT
You can access the FollowMyHealth Patient Portal offered by [facility] by registering at the following website: [email or web address]. By joining [facility]’s FollowMyHealth portal, you will also be able to view your health information using other applications (apps) compatible with our system.

## 2019-12-11 NOTE — CHART NOTE - NSCHARTNOTEFT_GEN_A_CORE
Called by patient's son Joey Ortega who states that he was not able to  her prescription for the fentanyl patch.  Per the pharmacy, the patient will need a prior authorization for these medications.  I have informed him that I do not have the ability to obtain a prior authorization.  He states that he will ask her PMD for the rx.  In the interim, I have prescribed a 3 day supply of extended release oxycodone.  Patient's son is also asking for an additional copy of her discharge paperwork.

## 2019-12-11 NOTE — DISCHARGE NOTE NURSING/CASE MANAGEMENT/SOCIAL WORK - NSDCPEWEB_GEN_ALL_CORE
[facility] for Tobacco Control website --- [email or web address] website --- [email or web address]

## 2019-12-11 NOTE — DISCHARGE NOTE NURSING/CASE MANAGEMENT/SOCIAL WORK - NSDCPEXARELTO_GEN_ALL_CORE
Rivaroxaban/Xarelto - Potential for adverse drug reactions and interactions/Rivaroxaban/Xarelto - Compliance/Rivaroxaban/Xarelto - Follow up monitoring/Rivaroxaban/Xarelto - Dietary Advice

## 2019-12-11 NOTE — DISCHARGE NOTE NURSING/CASE MANAGEMENT/SOCIAL WORK - NSDCPEEMAIL_GEN_ALL_CORE
[facility] for Tobacco Control email [email or web address]

## 2019-12-13 DIAGNOSIS — E11.65 TYPE 2 DIABETES MELLITUS WITH HYPERGLYCEMIA: ICD-10-CM

## 2019-12-13 DIAGNOSIS — A41.9 SEPSIS, UNSPECIFIED ORGANISM: ICD-10-CM

## 2019-12-13 DIAGNOSIS — E87.6 HYPOKALEMIA: ICD-10-CM

## 2019-12-13 DIAGNOSIS — N39.0 URINARY TRACT INFECTION, SITE NOT SPECIFIED: ICD-10-CM

## 2019-12-13 DIAGNOSIS — H26.9 UNSPECIFIED CATARACT: ICD-10-CM

## 2019-12-13 DIAGNOSIS — I10 ESSENTIAL (PRIMARY) HYPERTENSION: ICD-10-CM

## 2019-12-13 DIAGNOSIS — E11.40 TYPE 2 DIABETES MELLITUS WITH DIABETIC NEUROPATHY, UNSPECIFIED: ICD-10-CM

## 2019-12-13 DIAGNOSIS — L89.321 PRESSURE ULCER OF LEFT BUTTOCK, STAGE 1: ICD-10-CM

## 2019-12-13 DIAGNOSIS — J44.0 CHRONIC OBSTRUCTIVE PULMONARY DISEASE WITH (ACUTE) LOWER RESPIRATORY INFECTION: ICD-10-CM

## 2019-12-13 DIAGNOSIS — E87.0 HYPEROSMOLALITY AND HYPERNATREMIA: ICD-10-CM

## 2019-12-13 DIAGNOSIS — H54.8 LEGAL BLINDNESS, AS DEFINED IN USA: ICD-10-CM

## 2019-12-13 DIAGNOSIS — I48.19 OTHER PERSISTENT ATRIAL FIBRILLATION: ICD-10-CM

## 2019-12-13 DIAGNOSIS — F03.90 UNSPECIFIED DEMENTIA WITHOUT BEHAVIORAL DISTURBANCE: ICD-10-CM

## 2019-12-13 DIAGNOSIS — R65.21 SEVERE SEPSIS WITH SEPTIC SHOCK: ICD-10-CM

## 2019-12-13 DIAGNOSIS — G93.41 METABOLIC ENCEPHALOPATHY: ICD-10-CM

## 2019-12-13 DIAGNOSIS — Z74.01 BED CONFINEMENT STATUS: ICD-10-CM

## 2019-12-13 DIAGNOSIS — L89.021 PRESSURE ULCER OF LEFT ELBOW, STAGE 1: ICD-10-CM

## 2019-12-13 DIAGNOSIS — H33.20 SEROUS RETINAL DETACHMENT, UNSPECIFIED EYE: ICD-10-CM

## 2019-12-13 DIAGNOSIS — Z51.5 ENCOUNTER FOR PALLIATIVE CARE: ICD-10-CM

## 2019-12-13 DIAGNOSIS — J69.0 PNEUMONITIS DUE TO INHALATION OF FOOD AND VOMIT: ICD-10-CM

## 2019-12-13 DIAGNOSIS — B95.61 METHICILLIN SUSCEPTIBLE STAPHYLOCOCCUS AUREUS INFECTION AS THE CAUSE OF DISEASES CLASSIFIED ELSEWHERE: ICD-10-CM

## 2019-12-13 DIAGNOSIS — E44.0 MODERATE PROTEIN-CALORIE MALNUTRITION: ICD-10-CM

## 2019-12-13 DIAGNOSIS — F32.9 MAJOR DEPRESSIVE DISORDER, SINGLE EPISODE, UNSPECIFIED: ICD-10-CM

## 2019-12-13 DIAGNOSIS — J96.01 ACUTE RESPIRATORY FAILURE WITH HYPOXIA: ICD-10-CM

## 2019-12-13 DIAGNOSIS — R53.2 FUNCTIONAL QUADRIPLEGIA: ICD-10-CM

## 2019-12-13 DIAGNOSIS — R13.11 DYSPHAGIA, ORAL PHASE: ICD-10-CM

## 2019-12-13 DIAGNOSIS — B35.1 TINEA UNGUIUM: ICD-10-CM

## 2019-12-13 DIAGNOSIS — E78.5 HYPERLIPIDEMIA, UNSPECIFIED: ICD-10-CM

## 2019-12-13 DIAGNOSIS — E83.39 OTHER DISORDERS OF PHOSPHORUS METABOLISM: ICD-10-CM

## 2019-12-13 DIAGNOSIS — L89.521 PRESSURE ULCER OF LEFT ANKLE, STAGE 1: ICD-10-CM

## 2019-12-13 DIAGNOSIS — M17.0 BILATERAL PRIMARY OSTEOARTHRITIS OF KNEE: ICD-10-CM

## 2019-12-13 DIAGNOSIS — J96.02 ACUTE RESPIRATORY FAILURE WITH HYPERCAPNIA: ICD-10-CM

## 2020-05-26 NOTE — SWALLOW BEDSIDE ASSESSMENT ADULT - SLP PRECAUTIONS/LIMITATIONS: VISION
pt did not visually locate to SLP/impaired
impaired/eyes closed during eval
impaired/pt legally blind
impaired/pt legally blind
Statement Selected

## 2020-12-17 NOTE — ED PROVIDER NOTE - TOBACCO USE
TRANSITIONAL CARE MANAGEMENT - HOSPITAL DISCHARGE FOLLOW-UP    Contacted Ms. Baez regarding follow-up for TCM after hospital discharge. She was discharged from the hospital on 12/15. Review of the After Visit Summary from the recent hospitalization indicates that the patient needs to see Dr Sanches in follow up     She feels that she is doing well at home.   Her diet concern is none. Overall, the patient is eating well.   Ambulation: staying the same  Fever: is not present  Pain: none  Activities of Daily Living (global): Self-care   Patient states that she does have sufficient family support. She feels that she is able to ask for assistance when needed.     Additional patient/family concerns: None .    Discharge medications were verified with the patient. She is fully compliant with the medication regimen prescribed at the time of discharge. She reports that she is not having side effects   Reviewed medications with patient daughter. She is to take Ferrous Sulfate 325 Mon Wed Friday and Lasix every other day.     Advance Directives:  on file    Patient has an appointment on 12/22/20 with KAIA Sanches MD. Ms. Baez was reminded about the importance of keeping this appointment.     
Never smoker

## 2021-01-22 NOTE — H&P ADULT - NSHPPOASURGSITEINCISION_GEN_ALL_CORE
Lab Results   Component Value Date    EGFR 37 01/16/2021    EGFR 37 07/17/2020    EGFR 39 02/22/2020    CREATININE 1 25 (H) 01/16/2021    CREATININE 1 24 (H) 07/17/2020    CREATININE 1 19 02/22/2020   BUN and creatinine increased probable prerenal versus renal   Patient advised to increase fluid  no

## 2021-11-10 NOTE — PROGRESS NOTE ADULT - PROVIDER SPECIALTY LIST ADULT
Critical Care Respiratory Care Note      Patient remained on high flow nasal canula (50 lpm and 60%) over night to maintain normal SpaO2. Patient tolerating well. Will continue to monitor and titrate as needed it.

## 2021-11-24 NOTE — PATIENT PROFILE ADULT - AD AGENT PHONE NUMBER
Patient attended Phase 2 Cardiac Rehab Exercise Session. Further documentation will be scanned into the medical record upon discharge.   [phone]

## 2022-02-09 NOTE — PROGRESS NOTE ADULT - SUBJECTIVE AND OBJECTIVE BOX
CHIEF COMPLAINT:    Interval Events:  started on levophed for hypotension and was also started on phenylephrine since she became tachycardic  Febile to 101 and then to 102    REVIEW OF SYSTEMS:  [x ] Unable to assess ROS because intubated/sedated    OBJECTIVE:  ICU Vital Signs Last 24 Hrs  T(C): 37.7 (2019 15:09), Max: 39.7 (2019 02:30)  T(F): 99.9 (2019 15:09), Max: 103.5 (2019 02:30)  HR: 96 (2019 15:25) (92 - 147)  BP: 154/80 (2019 15:00) (70/50 - 154/80)  BP(mean): 99 (2019 15:) (48 - 99)  ABP: --  ABP(mean): --  RR: 23 (2019 15:25) (18 - 36)  SpO2: 96% (2019 15:25) (89% - 100%)    Mode: AC/ CMV (Assist Control/ Continuous Mandatory Ventilation), RR (machine): 18, TV (machine): 400, FiO2: 40, PEEP: 5, ITime: 1, MAP: 11, PIP: 20     @ : @ 07:00  --------------------------------------------------------  IN: 2014.3 mL / OUT: 1210 mL / NET: 804.3 mL     @ 07:  -   @ 15:42  --------------------------------------------------------  IN: 1643.7 mL / OUT: 1000 mL / NET: 643.7 mL      CAPILLARY BLOOD GLUCOSE      POCT Blood Glucose.: 167 mg/dL (2019 11:57)      PHYSICAL EXAM:  General: NAD, non toxic appearing  HEENT: ETT in place  Neck: supple  Respiratory: course BS, no wheezing  Cardiovascular: s1s2, tachycardic  Abdomen: soft, non tender, non distended, +BS  Extremities: warm, no edema or clubbing  Skin: intact  Neurological: unable to assess    LINES:  none    HOSPITAL MEDICATIONS:  MEDICATIONS  (STANDING):  albuterol/ipratropium for Nebulization 3 milliLiter(s) Nebulizer every 6 hours  azithromycin  IVPB 500 milliGRAM(s) IV Intermittent every 24 hours  azithromycin  IVPB      chlorhexidine 0.12% Liquid 15 milliLiter(s) Oral Mucosa every 12 hours  chlorhexidine 4% Liquid 1 Application(s) Topical <User Schedule>  dexMEDEtomidine Infusion 0.2 MICROgram(s)/kG/Hr (4.535 mL/Hr) IV Continuous <Continuous>  dextrose 5%. 1000 milliLiter(s) (50 mL/Hr) IV Continuous <Continuous>  dextrose 50% Injectable 12.5 Gram(s) IV Push once  dextrose 50% Injectable 25 Gram(s) IV Push once  dextrose 50% Injectable 25 Gram(s) IV Push once  enoxaparin Injectable 80 milliGRAM(s) SubCutaneous every 12 hours  fluconAZOLE IVPB 100 milliGRAM(s) IV Intermittent <User Schedule>  insulin lispro (HumaLOG) corrective regimen sliding scale   SubCutaneous every 6 hours  midazolam Injectable 1 milliGRAM(s) IV Push every 8 hours  norepinephrine Infusion 0.05 MICROgram(s)/kG/Min (8.503 mL/Hr) IV Continuous <Continuous>  pantoprazole   Suspension 40 milliGRAM(s) Oral daily  phenylephrine    Infusion 0.4 MICROgram(s)/kG/Min (11.19 mL/Hr) IV Continuous <Continuous>  piperacillin/tazobactam IVPB.. 3.375 Gram(s) IV Intermittent every 8 hours  polyethylene glycol 3350 17 Gram(s) Oral daily  propofol Infusion 20 MICROgram(s)/kG/Min (10.884 mL/Hr) IV Continuous <Continuous>  sodium chloride 0.45%. 1000 milliLiter(s) (75 mL/Hr) IV Continuous <Continuous>  sodium chloride 3%  Inhalation 3 milliLiter(s) Inhalation every 6 hours  vancomycin  IVPB 750 milliGRAM(s) IV Intermittent every 12 hours    MEDICATIONS  (PRN):  acetaminophen    Suspension .. 650 milliGRAM(s) Oral every 6 hours PRN Temp greater or equal to 38C (100.4F), Mild Pain (1 - 3), Moderate Pain (4 - 6)  dextrose 40% Gel 15 Gram(s) Oral once PRN Blood Glucose LESS THAN 70 milliGRAM(s)/deciliter  glucagon  Injectable 1 milliGRAM(s) IntraMuscular once PRN Glucose LESS THAN 70 milligrams/deciliter      LABS:                        16.3   13.78 )-----------( 226      ( 2019 03:11 )             54.5     Hgb Trend: 16.3<--, 16.7<--      150<H>  |  115<H>  |  23  ----------------------------<  209<H>  3.3<L>   |  23  |  0.97    Ca    9.5      2019 03:11  Phos  1.7       Mg     1.9         TPro  6.8  /  Alb  2.7<L>  /  TBili  0.6  /  DBili  x   /  AST  13<L>  /  ALT  9<L>  /  AlkPhos  70      PT/INR - ( 2019 13:01 )   PT: 14.8 sec;   INR: 1.31 ratio         PTT - ( 2019 13:01 )  PTT:28.7 sec  Urinalysis Basic - ( 2019 14:04 )    Color: Yellow / Appearance: very cloudy / S.010 / pH: x  Gluc: x / Ketone: Moderate  / Bili: Negative / Urobili: Negative mg/dL   Blood: x / Protein: 100 mg/dL / Nitrite: Negative   Leuk Esterase: Moderate / RBC: x / WBC >50   Sq Epi: x / Non Sq Epi: Moderate / Bacteria: Moderate      Arterial Blood Gas:   @ 18:02  --/42/74/23/95/-2.0  ABG lactate: --  Arterial Blood Gas:   @ 14:22  --/54/130/22/98/-5.5  ABG lactate: --        MICROBIOLOGY:     RADIOLOGY:  [ ] Reviewed and interpreted by me

## 2022-09-12 NOTE — OCCUPATIONAL THERAPY INITIAL EVALUATION ADULT - 2-POINT DISCRIMINATION, LLE, OT EVAL
Rx Refill Note  Requested Prescriptions     Pending Prescriptions Disp Refills   • gabapentin (NEURONTIN) 300 MG capsule [Pharmacy Med Name: GABAPENTIN 300 MG CAPS 300 Capsule] 180 capsule 2     Sig: TAKE TWO CAPSULES BY MOUTH THREE TIMES A DAY      Last office visit with prescribing clinician: 4/21/2022      Next office visit with prescribing clinician: 10/24/2022            ADAMA Rodriguez  09/12/22, 13:07 CDT   23-Jan-2017 22:00 unable to assess at this time

## 2024-06-10 NOTE — OCCUPATIONAL THERAPY INITIAL EVALUATION ADULT - RANGE OF MOTION EXAMINATION
Care Due:                  Date            Visit Type   Department     Provider  --------------------------------------------------------------------------------                                EP -                              PRIMARY      [facility] INTERNAL  Last Visit: 12-      CARE ([facility])   RIN Duque                              MYCHART                              FOLLOWUP/OF  [facility] INTERNAL  Next Visit: 09-      FICE VISIT   [facility]       Jojo Duque                                                            Last  Test          Frequency    Reason                     Performed    Due Date  --------------------------------------------------------------------------------    CBC.........  12 months..  fenofibrate..............  11- 11-    [facility] Embedded Care Due Messages. Reference number: [id number].   6/08/2024 8:02:29 PM CDT  
Please see the attached refill request.    LOV 12/11/23 Duque  
ROM is diminished in head neck and trunk

## 2024-12-19 NOTE — PROGRESS NOTE BEHAVIORAL HEALTH - NS ED [facility] REVIEW OF ED CHART AVAILABLE LABS REVIEWED
SAVANNAH SCHNEIDER is a 55y Female s/p RIGHT KNEE ARTHROSCOPY WITH MEDIAL MENISECTOMY      w/ h/o HTN (hypertension)      denies any chest pain shortness of breath palpitation dizziness lightheadedness nausea vomiting fever or chills    H/O cervical polypectomy      FH: heart disease (Father)    Family history of cervical cancer (Mother)      SH: doesnot smoke or drink at this time    No Known Allergies    fentaNYL    Injectable 50 MICROGram(s) IV Push every 15 minutes PRN  lactated ringers. 1000 milliLiter(s) IV Continuous <Continuous>  ondansetron Injectable 4 milliGRAM(s) IV Push once PRN    T(C): 36.3 (12-19-24 @ 09:26), Max: 36.7 (12-19-24 @ 06:31)  HR: 58 (12-19-24 @ 09:26) (50 - 82)  BP: 114/77 (12-19-24 @ 09:26) (98/66 - 114/77)  RR: 17 (12-19-24 @ 09:26) (10 - 17)  SpO2: 100% (12-19-24 @ 09:26) (98% - 100%)  HEENT unremarkable  neck no JVD or bruit  heart normal S1 S2 RRR no gallops or rubs  chest clear to auscultation  abd sof nontender non distended +bs  ext no calf tenderness    A/P   DVT PX  pain control  bowel regimen   wound care as per ortho  GI PX  antiemetics prn  incentive spirometer Yes